# Patient Record
Sex: MALE | Race: BLACK OR AFRICAN AMERICAN | Employment: FULL TIME | URBAN - METROPOLITAN AREA
[De-identification: names, ages, dates, MRNs, and addresses within clinical notes are randomized per-mention and may not be internally consistent; named-entity substitution may affect disease eponyms.]

---

## 2017-10-16 LAB
GLUCOSE BLD STRIP.AUTO-MCNC: 136 MG/DL (ref 65–100)
GLUCOSE BLD STRIP.AUTO-MCNC: 90 MG/DL (ref 65–100)
SERVICE CMNT-IMP: ABNORMAL
SERVICE CMNT-IMP: NORMAL

## 2017-10-16 PROCEDURE — 82962 GLUCOSE BLOOD TEST: CPT

## 2017-10-16 PROCEDURE — 99284 EMERGENCY DEPT VISIT MOD MDM: CPT

## 2017-10-17 ENCOUNTER — HOSPITAL ENCOUNTER (EMERGENCY)
Age: 37
Discharge: HOME | End: 2017-10-17
Attending: EMERGENCY MEDICINE
Payer: COMMERCIAL

## 2017-10-17 VITALS
DIASTOLIC BLOOD PRESSURE: 54 MMHG | HEART RATE: 67 BPM | BODY MASS INDEX: 25.34 KG/M2 | HEIGHT: 70 IN | OXYGEN SATURATION: 98 % | TEMPERATURE: 97.3 F | RESPIRATION RATE: 16 BRPM | WEIGHT: 177 LBS | SYSTOLIC BLOOD PRESSURE: 103 MMHG

## 2017-10-17 DIAGNOSIS — T50.901A MEDICATION ADMINISTERED IN ERROR, ACCIDENTAL OR UNINTENTIONAL, INITIAL ENCOUNTER: Primary | ICD-10-CM

## 2017-10-17 LAB
GLUCOSE BLD STRIP.AUTO-MCNC: 138 MG/DL (ref 65–100)
GLUCOSE BLD STRIP.AUTO-MCNC: 138 MG/DL (ref 65–100)
GLUCOSE BLD STRIP.AUTO-MCNC: 185 MG/DL (ref 65–100)
SERVICE CMNT-IMP: ABNORMAL

## 2017-10-17 PROCEDURE — 82962 GLUCOSE BLOOD TEST: CPT

## 2017-10-17 NOTE — DISCHARGE INSTRUCTIONS
Accidental Overdose of Medicine: Care Instructions  Your Care Instructions  Almost any medicine can cause harm if you take too much of it. You have had treatment to help your body get rid of an overdose of a medicine. This may have been an over-the-counter medicine. Or it might be one that a doctor prescribed. It may even have been a vitamin or a supplement. During treatment, the doctor may have given you fluids and medicine. You also may have had lab tests. Then the doctor made sure that you were well enough to go home. The doctor has checked you carefully, but problems can develop later. If you notice any problems or new symptoms, get medical treatment right away. Follow-up care is a key part of your treatment and safety. Be sure to make and go to all appointments, and call your doctor if you are having problems. It's also a good idea to know your test results and keep a list of the medicines you take. How can you care for yourself at home? Home care  · Drink plenty of fluids. If you have kidney, heart, or liver disease and have to limit fluids, talk with your doctor before you increase the amount of fluids you drink. · If you normally take medicines, ask your doctor when you can start taking them again. · Read the information that comes with any medicine. If you have questions, ask your doctor or pharmacist.  Prevention  · Be safe with medicines. Take your medicines exactly as prescribed or as the label directs. Call your doctor if you think you are having a problem with your medicine. · Keep your medicines in the containers they came in. Keep them with the original labels. · Find out what to do if you miss a dose of your medicine. When should you call for help? Call 911 anytime you think you may need emergency care. For example, call if:  · You passed out (lost consciousness). · You have trouble breathing. · You are sleepy or hard to wake up.   Call your doctor now or seek immediate medical care if:  · You are vomiting. · You have a new or worse headache. · You are dizzy or lightheaded or feel like you may faint. Watch closely for changes in your health, and be sure to contact your doctor if:  · You do not get better as expected. Where can you learn more? Go to http://philomena-shania.info/. Enter C165 in the search box to learn more about \"Accidental Overdose of Medicine: Care Instructions. \"  Current as of: March 24, 2017  Content Version: 11.3  © 7466-9191 [employer]. Care instructions adapted under license by [employer] (which disclaims liability or warranty for this information). If you have questions about a medical condition or this instruction, always ask your healthcare professional. [address] any warranty or liability for your use of this information.

## 2017-10-17 NOTE — ED PROVIDER NOTES
[facility]  EMERGENCY DEPARTMENT HISTORY AND PHYSICAL EXAM       Date of Service: 10/17/2017   Patient Name: Jonatan Forman   YOB: 1980  Medical Record Number: [id number]    History of Presenting Illness     Chief Complaint   Patient presents with    Medication Problem     pt reports he\"accidently took 30 units of humalog insulin at 10 pm instead of 3 units of humalog. \"         History Provided By:  patient    Additional History:     Jonatan Forman is a 39 y.o. male, pmhx DM / quadriplegia, who presents in wheelchair to the ED with concern for potential hypoglycemia PTA this evening. Pt is a quadriplegic and notes having home health care come help him get ready for bed every night. He states this evening his home health aid accidentally gave him 30 units of his Humalog instead of his usual 3 units. Pt expresses concern for hypoglycemia, thus prompting his visit to the ED tonight. On evaluation in the ED, pt states he has already eaten a turkey sandwich, grapes, a fruit cup, and 2 orange juices. He is otherwise without complaint at this time. Pt specifically denies any recent fevers, chills, nausea, vomiting, diarrhea, abd pain, CP, SOB, urinary sxs, changes in BM, or headache. Given pt is currently asymptomatic and without complaint there is no applicable location, quality, duration, severity, timing, context, associated sxs, additional context, or modifying factors. Allergies: NKDA  PMHx: Significant for DM, quadriplegic C6-7  PSHx: pt denies  Social Hx: -tobacco (former), +EtOH, -Illicit Drugs    There are no other complaints, changes, or physical findings at this time.      Primary Care Provider: Ivana Braxton MD       Past History       Past Medical History:   Past Medical History:   Diagnosis Date    Neurological disorder 2015    quad C 6-7    Type I (juvenile type) diabetes mellitus without mention of complication, uncontrolled Age 20    Unspecified vitamin D deficiency 5/7/2012        Past Surgical History:   No past surgical history on file. Family History:   Family History   Problem Relation Age of Onset    Diabetes Maternal Grandmother     Hypertension Mother     Heart Disease Neg Hx     Stroke Neg Hx         Social History:   Social History   Substance Use Topics    Smoking status: Former Smoker    Smokeless tobacco: Never Used      Comment: may have a cigar with a glass of wine but no cigarettes    Alcohol use Yes      Comment: 1-2x week may have a lite beer or a glass of wine        Allergies:   No Known Allergies       Review of Systems     Review of Systems   Constitutional: Negative for chills and fever. HENT: Negative for congestion, ear pain, rhinorrhea and sore throat. Respiratory: Negative for cough and shortness of breath. Cardiovascular: Negative for chest pain, palpitations and leg swelling. Gastrointestinal: Negative for abdominal pain, constipation, diarrhea, nausea and vomiting. No melena  No hematochezia   Endocrine: Negative for polyuria. Genitourinary: Negative for dysuria, frequency and hematuria. Neurological: Negative for dizziness, weakness, light-headedness, numbness and headaches. No tingling   All other systems reviewed and are negative.       Physical Exam    General appearance - well nourished, well appearing, and in no distress, in a wheelchair  Eyes - pupils equal and reactive, extraocular eye movements intact  ENT - mucous membranes moist, pharynx normal without lesions  Neck - supple, no significant adenopathy; non-tender to palpation  Chest - clear to auscultation, no wheezes, rales or rhonchi; non-tender to palpation  Heart - normal rate and regular rhythm, S1 and S2 normal, no murmurs noted  Abdomen - soft, nontender, nondistended, no masses or organomegaly  Musculoskeletal - no joint tenderness, deformity or swelling; normal ROM  Extremities - peripheral pulses normal, no pedal edema  Skin - normal coloration and turgor, no rashes  Neurological - alert, oriented x3, normal speech, weakness of upper and lower extremities that is chronic  Written by EVELIA Santiagoibe, as dictated by Kaden Jeffries MD         Provider Notes / MDM:     DDx: medication overdose, hypoglycemia      Medical Decision Making    I am the first provider for this patient. I reviewed the vital signs, available nursing notes, past medical history, past surgical history, family history and social history. ED Course:   12:41 AM   Initial assessment performed. The patients presenting problems have been discussed, and they are in agreement with the care plan formulated and outlined with them. I have encouraged them to ask questions as they arise throughout their visit. Progress Notes:    PROGRESS NOTE:  1:57 AM  Pt reevaluated. Pt's , pt tolerating PO without difficulty. Will continue to monitor. Written by EVELIA Santiagoibe, as dictated by Marisela Calderon MD    Progress note:  2:20 AM  Pt noted to be feeling better, BGL improved and stable, ready for discharge. Updated pt and/or family on all final lab findings. Will follow up as instructed. All questions have been answered, pt voiced understanding and agreement with plan. Specific return precautions provided as well as instructions to return to the ED should sx worsen at any time. Vital signs stable for discharge.    Written by EVELIA Santiagoibkyra, as dictated by Kaden Jeffries MD           Diagnostic Study Results    Labs        Recent Results (from the past 12 hour(s))   GLUCOSE, POC    Collection Time: 10/16/17 10:27 PM   Result Value Ref Range    Glucose (POC) 136 (H) 65 - 100 mg/dL    Performed by Stacy Espitia, POC    Collection Time: 10/16/17 11:38 PM   Result Value Ref Range    Glucose (POC) 90 65 - 100 mg/dL    Performed by Urban Vega, POC    Collection Time: 10/17/17 12:08 AM   Result Value Ref Range    Glucose (POC) 138 (H) 65 - 100 mg/dL    Performed by Radha Haskins (ED [facility])    GLUCOSE, POC    Collection Time: 10/17/17 12:59 AM   Result Value Ref Range    Glucose (POC) 138 (H) 65 - 100 mg/dL    Performed by [address], POC    Collection Time: 10/17/17  1:57 AM   Result Value Ref Range    Glucose (POC) 185 (H) 65 - 100 mg/dL    Performed by Cally Kwong*          Radiology - The following have been ordered and reviewed:    No orders to display     CT Results  (Last 48 hours)    None        CXR Results  (Last 48 hours)    None          Vital Signs - Reviewed the patient's vital signs. Patient Vitals for the past 12 hrs:   Temp Pulse Resp BP SpO2   10/17/17 0158 - 67 16 103/54 98 %   10/17/17 0153 - - - 103/54 -   10/16/17 2228 97.3 °F (36.3 °C) (!) 55 14 108/57 100 %       Medications Given in the ED:    Medications - No data to display      Diagnosis:  Clinical Impression:     1. Medication administered in error, accidental or unintentional, initial encounter         Plan:  1. Follow-up Information     Follow up With Details Devante Ireland MD In 2 days  [place] [address]  [phone]      [facility] EMERGENCY DEPT  If symptoms worsen [address] [facility]  [address] [facility]  [phone]          2. Discharge Medication List as of 10/17/2017  2:27 AM        Return to ED if worse. Disposition:    DISCHARGE NOTE:  2:27 AM  The patient's results have been reviewed with family and/or caregiver. They verbally convey their understanding and agreement of the patient's signs, symptoms, diagnosis, treatment, and prognosis. They additionally agree to follow up as recommended in the discharge instructions or to return to the Emergency Room should the patient's condition change prior to their follow-up appointment. The family and/or caregiver verbally agrees with the care-plan and all of their questions have been answered.  The discharge instructions have also been provided to the them along with educational information regarding the patient's diagnosis and a list of reasons why the patient would want to return to the ER prior to their follow-up appointment should their condition change. Written by EVELIA Isaacs, as dictated by Nadja Hallman MD.             This note is prepared by Merlyn Zamudio, acting as Scribe for MD Marisela Schrader MD : The scribe's documentation has been prepared under my direction and personally reviewed by me in its entirety. I confirm that the note above accurately reflects all work, treatment, procedures, and medical decision making performed by me. This note will not be viewable in [address]

## 2017-10-17 NOTE — ED NOTES
Dr ___Obier_____________________ in to talk with patient and explain plan of care with  understanding and  written & verbal instructions.

## 2017-10-17 NOTE — ED NOTES
Pt provided turkey sandwich and orange juice outside of triage 1 while waiting for ED room per ER MD Yarelis Moeller request

## 2017-10-28 ENCOUNTER — HOSPITAL ENCOUNTER (EMERGENCY)
Age: 37
Discharge: HOME | End: 2017-10-29
Attending: EMERGENCY MEDICINE
Payer: COMMERCIAL

## 2017-10-28 DIAGNOSIS — N39.0 URINARY TRACT INFECTION ASSOCIATED WITH INDWELLING URETHRAL CATHETER, INITIAL ENCOUNTER (HCC): Primary | ICD-10-CM

## 2017-10-28 DIAGNOSIS — T83.511A URINARY TRACT INFECTION ASSOCIATED WITH INDWELLING URETHRAL CATHETER, INITIAL ENCOUNTER (HCC): Primary | ICD-10-CM

## 2017-10-28 DIAGNOSIS — R31.9 HEMATURIA, UNSPECIFIED TYPE: ICD-10-CM

## 2017-10-28 LAB
GLUCOSE BLD STRIP.AUTO-MCNC: 283 MG/DL (ref 65–100)
SERVICE CMNT-IMP: ABNORMAL

## 2017-10-28 PROCEDURE — 82962 GLUCOSE BLOOD TEST: CPT

## 2017-10-28 PROCEDURE — 74011250637 HC RX REV CODE- 250/637: Performed by: EMERGENCY MEDICINE

## 2017-10-28 PROCEDURE — 99283 EMERGENCY DEPT VISIT LOW MDM: CPT

## 2017-10-28 RX ORDER — BUTALBITAL, ACETAMINOPHEN AND CAFFEINE 50; 325; 40 MG/1; MG/1; MG/1
1 TABLET ORAL
Status: COMPLETED | OUTPATIENT
Start: 2017-10-28 | End: 2017-10-28

## 2017-10-28 RX ADMIN — BUTALBITAL, ACETAMINOPHEN AND CAFFEINE 1 TABLET: 50; 325; 40 TABLET ORAL at 23:51

## 2017-10-29 VITALS
HEIGHT: 71 IN | OXYGEN SATURATION: 100 % | RESPIRATION RATE: 18 BRPM | DIASTOLIC BLOOD PRESSURE: 102 MMHG | TEMPERATURE: 97.8 F | BODY MASS INDEX: 24.36 KG/M2 | WEIGHT: 174 LBS | SYSTOLIC BLOOD PRESSURE: 152 MMHG | HEART RATE: 67 BPM

## 2017-10-29 LAB
ALBUMIN SERPL-MCNC: 3.6 G/DL (ref 3.5–5)
ALBUMIN/GLOB SERPL: 1 {RATIO} (ref 1.1–2.2)
ALP SERPL-CCNC: 66 U/L (ref 45–117)
ALT SERPL-CCNC: 15 U/L (ref 12–78)
ANION GAP SERPL CALC-SCNC: 7 MMOL/L (ref 5–15)
APPEARANCE UR: ABNORMAL
APTT PPP: 28.9 SEC (ref 22.1–32.5)
AST SERPL-CCNC: 9 U/L (ref 15–37)
BACTERIA URNS QL MICRO: ABNORMAL /HPF
BASOPHILS # BLD: 0 K/UL (ref 0–0.1)
BASOPHILS NFR BLD: 1 % (ref 0–1)
BILIRUB SERPL-MCNC: 0.4 MG/DL (ref 0.2–1)
BILIRUB UR QL CFM: NEGATIVE
BUN SERPL-MCNC: 13 MG/DL (ref 6–20)
BUN/CREAT SERPL: 19 (ref 12–20)
CALCIUM SERPL-MCNC: 8.9 MG/DL (ref 8.5–10.1)
CHLORIDE SERPL-SCNC: 102 MMOL/L (ref 97–108)
CO2 SERPL-SCNC: 28 MMOL/L (ref 21–32)
COLOR UR: ABNORMAL
CREAT SERPL-MCNC: 0.7 MG/DL (ref 0.7–1.3)
EOSINOPHIL # BLD: 0.2 K/UL (ref 0–0.4)
EOSINOPHIL NFR BLD: 3 % (ref 0–7)
EPITH CASTS URNS QL MICRO: ABNORMAL /LPF
ERYTHROCYTE [DISTWIDTH] IN BLOOD BY AUTOMATED COUNT: 15.3 % (ref 11.5–14.5)
GLOBULIN SER CALC-MCNC: 3.5 G/DL (ref 2–4)
GLUCOSE SERPL-MCNC: 296 MG/DL (ref 65–100)
GLUCOSE UR STRIP.AUTO-MCNC: >1000 MG/DL
HCT VFR BLD AUTO: 38.7 % (ref 36.6–50.3)
HGB BLD-MCNC: 13.6 G/DL (ref 12.1–17)
HGB UR QL STRIP: ABNORMAL
HYALINE CASTS URNS QL MICRO: ABNORMAL /LPF (ref 0–5)
INR PPP: 1.1 (ref 0.9–1.1)
KETONES UR QL STRIP.AUTO: 15 MG/DL
LEUKOCYTE ESTERASE UR QL STRIP.AUTO: ABNORMAL
LYMPHOCYTES # BLD: 1.8 K/UL (ref 0.8–3.5)
LYMPHOCYTES NFR BLD: 32 % (ref 12–49)
MCH RBC QN AUTO: 30 PG (ref 26–34)
MCHC RBC AUTO-ENTMCNC: 35.1 G/DL (ref 30–36.5)
MCV RBC AUTO: 85.2 FL (ref 80–99)
MONOCYTES # BLD: 0.6 K/UL (ref 0–1)
MONOCYTES NFR BLD: 11 % (ref 5–13)
NEUTS SEG # BLD: 3 K/UL (ref 1.8–8)
NEUTS SEG NFR BLD: 53 % (ref 32–75)
NITRITE UR QL STRIP.AUTO: NEGATIVE
PH UR STRIP: 6 [PH] (ref 5–8)
PLATELET # BLD AUTO: 218 K/UL (ref 150–400)
POTASSIUM SERPL-SCNC: 3.8 MMOL/L (ref 3.5–5.1)
PROT SERPL-MCNC: 7.1 G/DL (ref 6.4–8.2)
PROT UR STRIP-MCNC: 300 MG/DL
PROTHROMBIN TIME: 10.6 SEC (ref 9–11.1)
RBC # BLD AUTO: 4.54 M/UL (ref 4.1–5.7)
RBC #/AREA URNS HPF: ABNORMAL /HPF (ref 0–5)
SODIUM SERPL-SCNC: 137 MMOL/L (ref 136–145)
SP GR UR REFRACTOMETRY: 1.02 (ref 1–1.03)
THERAPEUTIC RANGE,PTTT: NORMAL SECS (ref 58–77)
UROBILINOGEN UR QL STRIP.AUTO: 1 EU/DL (ref 0.2–1)
WBC # BLD AUTO: 5.6 K/UL (ref 4.1–11.1)
WBC URNS QL MICRO: ABNORMAL /HPF (ref 0–4)

## 2017-10-29 PROCEDURE — 85610 PROTHROMBIN TIME: CPT | Performed by: EMERGENCY MEDICINE

## 2017-10-29 PROCEDURE — 96365 THER/PROPH/DIAG IV INF INIT: CPT

## 2017-10-29 PROCEDURE — 36415 COLL VENOUS BLD VENIPUNCTURE: CPT | Performed by: EMERGENCY MEDICINE

## 2017-10-29 PROCEDURE — 77030005514 HC CATH URETH FOL14 BARD -A

## 2017-10-29 PROCEDURE — 85025 COMPLETE CBC W/AUTO DIFF WBC: CPT | Performed by: EMERGENCY MEDICINE

## 2017-10-29 PROCEDURE — 81001 URINALYSIS AUTO W/SCOPE: CPT | Performed by: EMERGENCY MEDICINE

## 2017-10-29 PROCEDURE — 51702 INSERT TEMP BLADDER CATH: CPT

## 2017-10-29 PROCEDURE — 80053 COMPREHEN METABOLIC PANEL: CPT | Performed by: EMERGENCY MEDICINE

## 2017-10-29 PROCEDURE — 74011000258 HC RX REV CODE- 258: Performed by: EMERGENCY MEDICINE

## 2017-10-29 PROCEDURE — 85730 THROMBOPLASTIN TIME PARTIAL: CPT | Performed by: EMERGENCY MEDICINE

## 2017-10-29 PROCEDURE — 74011250636 HC RX REV CODE- 250/636: Performed by: EMERGENCY MEDICINE

## 2017-10-29 RX ORDER — SODIUM CHLORIDE 900 MG/100ML
INJECTION INTRAVENOUS
Status: DISCONTINUED
Start: 2017-10-29 | End: 2017-10-29 | Stop reason: HOSPADM

## 2017-10-29 RX ORDER — CEPHALEXIN 500 MG/1
500 CAPSULE ORAL 3 TIMES DAILY
Qty: 21 CAP | Refills: 0 | Status: SHIPPED | OUTPATIENT
Start: 2017-10-29 | End: 2017-11-05

## 2017-10-29 RX ORDER — TAMSULOSIN HYDROCHLORIDE 0.4 MG/1
0.4 CAPSULE ORAL DAILY
Qty: 15 CAP | Refills: 0 | Status: SHIPPED | OUTPATIENT
Start: 2017-10-29 | End: 2017-11-13

## 2017-10-29 RX ORDER — CEFTRIAXONE 1 G/1
INJECTION, POWDER, FOR SOLUTION INTRAMUSCULAR; INTRAVENOUS
Status: DISCONTINUED
Start: 2017-10-29 | End: 2017-10-29 | Stop reason: HOSPADM

## 2017-10-29 RX ADMIN — CEFTRIAXONE 1 G: 1 INJECTION, POWDER, FOR SOLUTION INTRAMUSCULAR; INTRAVENOUS at 01:15

## 2017-10-29 NOTE — ED NOTES
Bladder scanned the pt. Bladder scanner showed 0 mL of urine in the bladder.  Discussed with Jeanette Mason MD.

## 2017-10-29 NOTE — ED NOTES
Pt requested peroxide to rinse his mouth. Diluted peroxide with water and gave to pt to rinse his mouth.

## 2017-10-29 NOTE — ED NOTES
Procedure explained to patient and he consents to catheter irrigation. Indwelling catheter irrigated without resistance with 70cc of sterile solution, retrieved approx 70cc of fluids back with a few small blood clots. Irrigated again with 60cc of sterile fluids with retrieval of 60cc return with a couple of small blood clots. No evidence of catheter obstruction. Patient tolerated without complaint.   Assisted with getting dressed and patient was able to transfer himself to wheelchair with stand by assist.

## 2017-10-29 NOTE — DISCHARGE INSTRUCTIONS
Blood in the Urine: Care Instructions  Your Care Instructions    Blood in the urine, or hematuria, may make the urine look red, brown, or pink. There may be blood every time you urinate or just from time to time. You cannot always see blood in the urine, but it will show up in a urine test.  Blood in the urine may be serious. It should always be checked by a doctor. Your doctor may recommend more tests, including an X-ray, a CT scan, or a cystoscopy (which lets a doctor look inside the urethra and bladder). Blood in the urine can be a sign of another problem. Common causes are bladder infections and kidney stones. An injury to your groin or your genital area can also cause bleeding in the urinary tract. Very hard exercise-such as running a marathon-can cause blood in the urine. Blood in the urine can also be a sign of kidney disease or cancer in the bladder or kidney. Many cases of blood in the urine are caused by a harmless condition that runs in families. This is called benign familial hematuria. It does not need any treatment. Sometimes your urine may look red or brown even though it does not contain blood. For example, not getting enough fluids (dehydration), taking certain medicines, or having a liver problem can change the color of your urine. Eating foods such as beets, rhubarb, or blackberries or foods with red food coloring can make your urine look red or pink. Follow-up care is a key part of your treatment and safety. Be sure to make and go to all appointments, and call your doctor if you are having problems. It's also a good idea to know your test results and keep a list of the medicines you take. When should you call for help? Call your doctor now or seek immediate medical care if:  · You have symptoms of a urinary infection. For example:  ¨ You have pus in your urine. ¨ You have pain in your back just below your rib cage. This is called flank pain.   ¨ You have a fever, chills, or body aches.  ¨ It hurts to urinate. ¨ You have groin or belly pain. · You have more blood in your urine. Watch closely for changes in your health, and be sure to contact your doctor if:  · You have new urination problems. · You do not get better as expected. Where can you learn more? Go to http://philomena-shania.info/. Enter G089 in the search box to learn more about \"Blood in the Urine: Care Instructions. \"  Current as of: May 12, 2017  Content Version: 11.4  © 8423-2604 [employer]. Care instructions adapted under license by [employer] (which disclaims liability or warranty for this information). If you have questions about a medical condition or this instruction, always ask your healthcare professional. [address] any warranty or liability for your use of this information. Learning About Urinary Catheter Care to Prevent Infection  What is a urinary catheter? A urinary catheter is a flexible plastic tube used to drain urine from your bladder when you can't urinate on your own. The catheter allows urine to drain from the bladder into a bag. Two types of drainage bags may be used with a urinary catheter. · A bedside bag is a large bag that you can hang on the side of your bed or on a chair. You can use it overnight or anytime you will be sitting or lying down for a long time. · A leg bag is a small bag that you can use during the day. It is usually attached to your thigh or calf and hidden under your clothes. Having a urinary catheter increases your risk of getting a urinary tract infection. Germs may get on the catheter and cause an infection in your bladder or kidneys. The longer you have a catheter, the more likely it is that you will get an infection. You can help prevent this problem with good hygiene and careful handling of your catheter and drainage bags. How can you help prevent infection?   Take care to be clean  · Always wash your hands well before and after you handle your catheter. · Clean the skin around the catheter twice a day using soap and water. Dry with a clean towel afterward. You can shower with your catheter and drainage bag in place unless your doctor told you not to. · When you clean around the catheter, check the surrounding skin for signs of infection. Look for things like pus or irritated, swollen, red, or tender skin around the catheter. Be careful with your drainage bag  · Always keep the drainage bag below the level of your bladder. This will help keep urine from flowing back into your bladder. · Check often to see that urine is flowing through the catheter into the drainage bag. · Empty the drainage bag when it is half full. This will keep it from overflowing or backing up. · When you empty the drainage bag, do not let the tubing or drain spout touch anything. Be careful with your catheter  · Do not unhook the catheter from the drain tube. That could let germs get into the tube. · Make sure that the catheter tubing does not get twisted or kinked. · Do not tug or pull on the catheter. And make sure that the drainage bag does not drag or pull on the catheter. · Do not put powder or lotion on the skin around the catheter. · Talk with your doctor about your options for sexual intercourse while wearing a catheter. How do you empty a urine drainage bag? If your doctor has asked you to keep a record, write down the amount of urine in the bag before you empty it. Wash your hands before and after you touch the bag. 1. Remove the drain spout from its sleeve at the bottom of the drainage bag.  2. Open the valve on the drain spout. Let the urine flow out into the toilet or a container. Be careful not to let the tubing or drain spout touch anything. 3. After you empty the bag, wipe off any liquid on the end of the drain spout. Close the valve.  Then put the drain spout back into its sleeve at the bottom of the collection bag. How do you add a bedside bag to a leg bag? Wash your hands before and after you handle the bags. 1. Empty the leg bag attached to the catheter. 2. Put a clean towel under the leg bag.  3. Use an alcohol wipe to clean the tip of the bedside bag. Then connect the bedside bag to the leg bag. How can you clean a bedside drainage bag? Many people clean their bedside bag in the morning if they switch to a leg bag. To clean a bedside drainage ba. Remove the bedside bag from the leg bag.  2. Fill the bag with 2 parts vinegar and 3 parts water. Let it stand for 20 minutes. 3. Empty the bag, and let it air dry. When should you call for help? Call your doctor now or seek immediate medical care if:  · You have symptoms of a urinary infection. These may include:  ¨ Pain or burning when you urinate. ¨ A frequent need to urinate without being able to pass much urine. ¨ Pain in the flank, which is just below the rib cage and above the waist on either side of the back. ¨ Blood in your urine. ¨ A fever. · Your urine smells bad. · You see large blood clots in your urine. · No urine or very little urine is flowing into the bag for 4 or more hours. Watch closely for changes in your health, and be sure to contact your doctor if:  · The area around the catheter becomes irritated, swollen, red, or tender, or there is pus draining from it. · Urine is leaking from the place where the catheter enters your body. Follow-up care is a key part of your treatment and safety. Be sure to make and go to all appointments, and call your doctor if you are having problems. It's also a good idea to know your test results and keep a list of the medicines you take. Where can you learn more? Go to http://philomena-shania.info/. Enter C910 in the search box to learn more about \"Learning About Urinary Catheter Care to Prevent Infection. \"  Current as of:  May 12, 2017  Content Version: 11.4  © 1692-2003 [employer]. Care instructions adapted under license by [employer] (which disclaims liability or warranty for this information). If you have questions about a medical condition or this instruction, always ask your healthcare professional. [address] any warranty or liability for your use of this information.
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
## 2017-10-29 NOTE — ED NOTES
Patient presents to ED with C/O blood in urine and a H/A that started yesterday. The pt stated he had a bonner placed on Friday. The pt stated he noticed blood in the bonner bag Friday night after the bag was hanging off the side of the bed. The pt stated he thought he put the bag on the bed, but when he woke up the bag was hanging off the side of the bed. The pt denies fever/chills. Patient is A&Ox3, side rails up, call bell w/in reach, and aware of plan of care. The patient is in NAD.

## 2017-10-29 NOTE — ED NOTES
Patient discharged and given discharge instructions by Brad Marcano MD. Patient had an opportunity to ask questions. Patient verbalized understanding of discharge instructions. Patient d/c from ED In his personal wheelchair, discharge instructions and prescriptions in hand. Patient accompanied by self. Discussed with Brad Marcano MD pt's BP of 152/102 and toothache. Pt refused medication. Brad Marcano MD okay for pt to be d/c'd. Pt requesting bonner to be changed.  Discussed with Brad Marcano MD.

## 2018-03-16 ENCOUNTER — OFFICE VISIT (OUTPATIENT)
Dept: URGENT CARE | Age: 38
End: 2018-03-16

## 2018-03-16 VITALS
WEIGHT: 179 LBS | DIASTOLIC BLOOD PRESSURE: 107 MMHG | TEMPERATURE: 98.4 F | RESPIRATION RATE: 18 BRPM | BODY MASS INDEX: 25.06 KG/M2 | OXYGEN SATURATION: 99 % | HEIGHT: 71 IN | SYSTOLIC BLOOD PRESSURE: 194 MMHG | HEART RATE: 63 BPM

## 2018-03-16 DIAGNOSIS — K08.89 PAIN OF TOOTH SOCKET: Primary | ICD-10-CM

## 2018-03-16 RX ORDER — CLINDAMYCIN HYDROCHLORIDE 300 MG/1
300 CAPSULE ORAL 3 TIMES DAILY
Qty: 30 CAP | Refills: 0 | Status: SHIPPED | OUTPATIENT
Start: 2018-03-16 | End: 2018-03-26

## 2018-03-16 RX ORDER — ACETAMINOPHEN AND CODEINE PHOSPHATE 300; 30 MG/1; MG/1
1 TABLET ORAL
Qty: 12 TAB | Refills: 0 | Status: SHIPPED | OUTPATIENT
Start: 2018-03-16 | End: 2019-03-14

## 2018-03-16 NOTE — PATIENT INSTRUCTIONS
Dental Pain: After Your Visit  Your Care Instructions  The most common cause of dental pain is tooth decay. It can also be caused by an infection of the tooth (abscess) or gum, a tooth that has not broken all the way through the gum (impacted tooth), or a problem with the nerve-filled center of the tooth. Follow-up care is a key part of your treatment and safety. Be sure to make and go to all appointments, and call your doctor if you are having problems. Its also a good idea to know your test results and keep a list of the medicines you take. How can you care for yourself at home? · Contact a dentist for follow-up care. · Put ice or a cold pack on the outside of your mouth for 10 to 20 minutes at a time to reduce pain and swelling. Put a thin cloth between the ice and your skin. · Take an over-the-counter pain medicine, such as acetaminophen (Tylenol), ibuprofen (Advil, Motrin), or naproxen (Aleve). Read and follow all instructions on the label. · Do not take two or more pain medicines at the same time unless the doctor told you to. Many pain medicines have acetaminophen, which is Tylenol. Too much acetaminophen (Tylenol) can be harmful. · Rinse your mouth with warm salt water every 2 hours to help relieve pain and swelling from an infected tooth. Mix 1 teaspoon of salt in 8 ounces of water. · If your doctor prescribed antibiotics, take them as directed. Do not stop taking them just because you feel better. You need to take the full course of antibiotics. When should you call for help? Call your doctor now or seek immediate medical care if:  · You have signs of infection, such as:  ¨ Increased pain, swelling, warmth, or redness. ¨ Pus draining from the gum, tooth, or face. ¨ A fever. Watch closely for changes in your health, and be sure to contact your doctor if:  · You do not get better as expected. Where can you learn more?    Go to [employer]  Enter B564 in the search box to learn more about \"Dental Pain: After Your Visit. \"   © 8857-8058 Healthwise, Incorporated. Care instructions adapted under license by [employer] (which disclaims liability or warranty for this information). This care instruction is for use with your licensed healthcare professional. If you have questions about a medical condition or this instruction, always ask your healthcare professional. [address] any warranty or liability for your use of this information. Content Version: 98.4.983574;  Last Revised: May 17, 2013

## 2018-03-16 NOTE — MR AVS SNAPSHOT
[address] [place] [address] 
[phone] Patient: Mikayla An MRN: [id number] :1980 Visit Information Date & Time Provider Department Dept. Phone Encounter #  
 3/16/2018  7:15 PM [address] [facility] [phone] [id number] Follow-up Instructions Return for Follow up with PCP. Upcoming Health Maintenance Date Due  
 FOOT EXAM Q1 1989 EYE EXAM RETINAL OR DILATED Q1 1989 Pneumococcal 19-64 Medium Risk (1 of 1 - PPSV23) 1998 DTaP/Tdap/Td series (1 - Tdap) 2000 HEMOGLOBIN A1C Q6M 2015 MICROALBUMIN Q1 2015 LIPID PANEL Q1 2015 Influenza Age 5 to Adult 2017 Allergies as of 3/16/2018  Review Complete On: 3/16/2018 By: Cadence Collado RN No Known Allergies Current Immunizations  Reviewed on 2016 No immunizations on file. Not reviewed this visit You Were Diagnosed With   
  
 Codes Comments Pain of tooth socket    -  Primary ICD-10-CM: K08.89 ICD-9-CM: 525.9 h/o broken tooth Vitals BP Pulse Temp Resp Height(growth percentile) Weight(growth percentile) (!) 194/107 63 98.4 °F (36.9 °C) 18 5' 11\" (1.803 m) 179 lb (81.2 kg) SpO2 BMI Smoking Status 99% 24.97 kg/m2 Former Smoker BMI and BSA Data Body Mass Index Body Surface Area 24.97 kg/m 2 2.02 m 2 Preferred Pharmacy Pharmacy Name Phone [facility], [address] [place] [address] AT [address] [place] [address] Drive [phone] Your Updated Medication List  
  
   
This list is accurate as of 3/16/18  7:52 PM.  Always use your most recent med list.  
  
  
  
  
 acetaminophen-codeine 300-30 mg per tablet Commonly known as:  TYLENOL-CODEINE #3 Take 1 Tab by mouth every four (4) hours as needed for Pain. Max Daily Amount: 6 Tabs. Ascensia CONTOUR strip Generic drug:  glucose blood VI test strips Use as directed three times daily. Dx: 250.03  
  
 baclofen 20 mg tablet Commonly known as:  LIORESAL Take 20 mg by mouth three (3) times daily. clindamycin 300 mg capsule Commonly known as:  CLEOCIN Take 1 Cap by mouth three (3) times daily for 10 days. cloNIDine HCl 0.2 mg tablet Commonly known as:  CATAPRES Take 0.2 mg by mouth two (2) times a day. dantrolene 25 mg capsule Commonly known as:  DANTRIUM Take 50 mg by mouth four (4) times daily. insulin glargine 100 unit/mL injection Commonly known as:  LANTUS  
36 Units by SubCUTAneous route nightly. Indications: TYPE 1 DIABETES MELLITUS  
  
 insulin lispro 100 unit/mL injection Commonly known as:  HUMALOG  
10-12 Units by SubCUTAneous route Before breakfast, lunch, and dinner. Indications: TYPE 1 DIABETES MELLITUS Insulin Syringe-Needle U-100 0.3 mL 31 gauge x 5/16 Syrg Use as directed twice daily  
  
 oxybutynin 5 mg tablet Commonly known as:  QOMKOYUC Take 5 mg by mouth three (3) times daily. traZODone 50 mg tablet Commonly known as:  Bonnita Genre Take 50 mg by mouth nightly. VITAMIN D3 2,000 unit Tab Generic drug:  cholecalciferol (vitamin D3) Take 4,000-5,000 mL by mouth daily. Prescriptions Printed Refills  
 acetaminophen-codeine (TYLENOL-CODEINE #3) 300-30 mg per tablet 0 Sig: Take 1 Tab by mouth every four (4) hours as needed for Pain. Max Daily Amount: 6 Tabs. Class: Print Route: Oral  
  
Prescriptions Sent to Pharmacy Refills  
 clindamycin (CLEOCIN) 300 mg capsule 0 Sig: Take 1 Cap by mouth three (3) times daily for 10 days. Class: Normal  
 Pharmacy: [facility] [address] AT [address] Ph #: [phone] Route: Oral  
  
Follow-up Instructions Return for Follow up with PCP. Patient Instructions Dental Pain: After Your Visit Your Care Instructions The most common cause of dental pain is tooth decay. It can also be caused by an infection of the tooth (abscess) or gum, a tooth that has not broken all the way through the gum (impacted tooth), or a problem with the nerve-filled center of the tooth. Follow-up care is a key part of your treatment and safety. Be sure to make and go to all appointments, and call your doctor if you are having problems. Its also a good idea to know your test results and keep a list of the medicines you take. How can you care for yourself at home? · Contact a dentist for follow-up care. · Put ice or a cold pack on the outside of your mouth for 10 to 20 minutes at a time to reduce pain and swelling. Put a thin cloth between the ice and your skin. · Take an over-the-counter pain medicine, such as acetaminophen (Tylenol), ibuprofen (Advil, Motrin), or naproxen (Aleve). Read and follow all instructions on the label. · Do not take two or more pain medicines at the same time unless the doctor told you to. Many pain medicines have acetaminophen, which is Tylenol. Too much acetaminophen (Tylenol) can be harmful. · Rinse your mouth with warm salt water every 2 hours to help relieve pain and swelling from an infected tooth. Mix 1 teaspoon of salt in 8 ounces of water. · If your doctor prescribed antibiotics, take them as directed. Do not stop taking them just because you feel better. You need to take the full course of antibiotics. When should you call for help? Call your doctor now or seek immediate medical care if: 
· You have signs of infection, such as: 
¨ Increased pain, swelling, warmth, or redness. ¨ Pus draining from the gum, tooth, or face. ¨ A fever. Watch closely for changes in your health, and be sure to contact your doctor if: 
· You do not get better as expected. Where can you learn more? Go to [employer].be Enter V264 in the search box to learn more about \"Dental Pain: After Your Visit. \"  
© 2224-6118 Healthwise, Incorporated. Care instructions adapted under license by [employer] (which disclaims liability or warranty for this information). This care instruction is for use with your licensed healthcare professional. If you have questions about a medical condition or this instruction, always ask your healthcare professional. [address] any warranty or liability for your use of this information. Content Version: 52.5.064663; Last Revised: May 17, 2013 Introducing [facility]! [employer] introduces [employer] patient portal. Now you can access parts of your medical record, email your doctor's office, and request medication refills online. 1. In your internet browser, go to [email or web address] "[employer]"/[facility] 2. Click on the First Time User? Click Here link in the Sign In box. You will see the New Member Sign Up page. 3. Enter your [employer] Access Code exactly as it appears below. You will not need to use this code after youve completed the sign-up process. If you do not sign up before the expiration date, you must request a new code. · [employer] Access Code: [email or web address] Expires: 6/14/2018  7:26 PM 
 
4. Enter the last four digits of your Social Security Number (xxxx) and Date of Birth (mm/dd/yyyy) as indicated and click Submit. You will be taken to the next sign-up page. 5. Create a [employer] ID. This will be your [employer] login ID and cannot be changed, so think of one that is secure and easy to remember. 6. Create a [employer] password. You can change your password at any time. 7. Enter your Password Reset Question and Answer. This can be used at a later time if you forget your password. 8. Enter your e-mail address. You will receive e-mail notification when new information is available in [address] 9. Click Sign Up. You can now view and download portions of your medical record. 10. Click the Download Summary menu link to download a portable copy of your medical information. If you have questions, please visit the Frequently Asked Questions section of the [employer] website. Remember, [employer] is NOT to be used for urgent needs. For medical emergencies, dial 911. Now available from your iPhone and Android! Please provide this summary of care documentation to your next provider. Your primary care clinician is listed as Traci Rachel. If you have any questions after today's visit, please call [phone].

## 2018-03-17 NOTE — PROGRESS NOTES
Patient is a 44 y.o. male presenting with dental problem. Dental Pain    The history is provided by the patient. This is a new problem. The current episode started yesterday. The problem occurs constantly. The problem has been rapidly worsening. The pain is located in the left upper mouth. The quality of the pain is constant, throbbing and stabbing. The pain is at a severity of 10/10. The pain is severe. There was no fever, no shortness of breath, no gum redness and no drainage. Treatments tried: motrin  The treatment provided no relief. 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 Family History   Problem Relation Age of Onset    Diabetes Maternal Grandmother     Hypertension Mother     Heart Disease Neg Hx     Stroke Neg Hx         Social History     Social History    Marital status:      Spouse name: N/A    Number of children: N/A    Years of education: N/A     Occupational History    Not on file. Social History Main Topics    Smoking status: Former Smoker    Smokeless tobacco: Never Used      Comment: may have a cigar with a glass of wine but no cigarettes    Alcohol use Yes      Comment: 1-2x week may have a lite beer or a glass of wine    Drug use: No    Sexual activity: Not Currently     Other Topics Concern    Not on file     Social History Narrative    Lives in [place] with wife of 10 years and 4 yo and 3 yo sons. 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 Works for [employer] in the [employer]. ALLERGIES: Review of patient's allergies indicates no known allergies. Review of Systems   HENT: Positive for dental problem. All other systems reviewed and are negative.       Vitals: 03/16/18 1930   BP: (!) 194/107   Pulse: 63   Resp: 18   Temp: 98.4 °F (36.9 °C)   SpO2: 99%   Weight: 179 lb (81.2 kg)   Height: 5' 11\" (1.803 m)       Physical Exam   HENT:   Mouth/Throat: No oral lesions. No trismus in the jaw. Abnormal dentition (broken tooth ). Dental caries present. No dental abscesses. Nursing note and vitals reviewed. MDM    Procedures      ICD-10-CM ICD-9-CM    1. Pain of tooth socket K08.89 525.9 acetaminophen-codeine (TYLENOL-CODEINE #3) 300-30 mg per tablet    h/o broken tooth      Medications Ordered Today   Medications    clindamycin (CLEOCIN) 300 mg capsule     Sig: Take 1 Cap by mouth three (3) times daily for 10 days. Dispense:  30 Cap     Refill:  0    acetaminophen-codeine (TYLENOL-CODEINE #3) 300-30 mg per tablet     Sig: Take 1 Tab by mouth every four (4) hours as needed for Pain. Max Daily Amount: 6 Tabs. Dispense:  12 Tab     Refill:  0     No results found for any visits on 03/16/18. The patients condition was discussed with the patient and they understand. The patient is to follow up with primary care doctor. If signs and symptoms become worse the pt is to go to the ER. The patient is to take medications as prescribed.           reviewed no h/o narcotic use

## 2018-09-09 ENCOUNTER — HOSPITAL ENCOUNTER (EMERGENCY)
Age: 38
Discharge: HOME | End: 2018-09-09
Attending: EMERGENCY MEDICINE
Payer: COMMERCIAL

## 2018-09-09 VITALS
DIASTOLIC BLOOD PRESSURE: 79 MMHG | HEIGHT: 70 IN | BODY MASS INDEX: 24.34 KG/M2 | SYSTOLIC BLOOD PRESSURE: 129 MMHG | RESPIRATION RATE: 16 BRPM | HEART RATE: 60 BPM | WEIGHT: 170 LBS | TEMPERATURE: 98.3 F | OXYGEN SATURATION: 100 %

## 2018-09-09 DIAGNOSIS — T83.510A URINARY TRACT INFECTION ASSOCIATED WITH CYSTOSTOMY CATHETER, INITIAL ENCOUNTER (HCC): Primary | ICD-10-CM

## 2018-09-09 DIAGNOSIS — N39.0 URINARY TRACT INFECTION ASSOCIATED WITH CYSTOSTOMY CATHETER, INITIAL ENCOUNTER (HCC): Primary | ICD-10-CM

## 2018-09-09 LAB
APPEARANCE UR: ABNORMAL
BACTERIA URNS QL MICRO: ABNORMAL /HPF
BILIRUB UR QL: NEGATIVE
COLOR UR: ABNORMAL
EPITH CASTS URNS QL MICRO: ABNORMAL /LPF
GLUCOSE UR STRIP.AUTO-MCNC: 250 MG/DL
HGB UR QL STRIP: ABNORMAL
KETONES UR QL STRIP.AUTO: NEGATIVE MG/DL
LEUKOCYTE ESTERASE UR QL STRIP.AUTO: ABNORMAL
NITRITE UR QL STRIP.AUTO: POSITIVE
PH UR STRIP: 8.5 [PH] (ref 5–8)
PROT UR STRIP-MCNC: 100 MG/DL
RBC #/AREA URNS HPF: ABNORMAL /HPF (ref 0–5)
SP GR UR REFRACTOMETRY: 1.02 (ref 1–1.03)
UA: UC IF INDICATED,UAUC: ABNORMAL
UROBILINOGEN UR QL STRIP.AUTO: 0.2 EU/DL (ref 0.2–1)
WBC URNS QL MICRO: ABNORMAL /HPF (ref 0–4)

## 2018-09-09 PROCEDURE — 99283 EMERGENCY DEPT VISIT LOW MDM: CPT

## 2018-09-09 PROCEDURE — 87077 CULTURE AEROBIC IDENTIFY: CPT | Performed by: EMERGENCY MEDICINE

## 2018-09-09 PROCEDURE — 81001 URINALYSIS AUTO W/SCOPE: CPT | Performed by: EMERGENCY MEDICINE

## 2018-09-09 PROCEDURE — 74011250636 HC RX REV CODE- 250/636: Performed by: PHYSICIAN ASSISTANT

## 2018-09-09 PROCEDURE — 87086 URINE CULTURE/COLONY COUNT: CPT | Performed by: EMERGENCY MEDICINE

## 2018-09-09 PROCEDURE — 77030018846 HC SOL IRR STRL H20 ICUM -A

## 2018-09-09 PROCEDURE — 77030005538 HC CATH URETH FOL44 BARD -B

## 2018-09-09 PROCEDURE — 51702 INSERT TEMP BLADDER CATH: CPT

## 2018-09-09 PROCEDURE — 51798 US URINE CAPACITY MEASURE: CPT

## 2018-09-09 PROCEDURE — 87186 SC STD MICRODIL/AGAR DIL: CPT | Performed by: EMERGENCY MEDICINE

## 2018-09-09 PROCEDURE — 96372 THER/PROPH/DIAG INJ SC/IM: CPT

## 2018-09-09 RX ORDER — CEPHALEXIN 500 MG/1
500 CAPSULE ORAL 4 TIMES DAILY
Qty: 28 CAP | Refills: 0 | Status: SHIPPED | OUTPATIENT
Start: 2018-09-09 | End: 2018-09-16

## 2018-09-09 RX ADMIN — LIDOCAINE HYDROCHLORIDE 1 G: 10 INJECTION, SOLUTION EPIDURAL; INFILTRATION; INTRACAUDAL; PERINEURAL at 16:46

## 2018-09-09 NOTE — ED NOTES
CYNTHIA Heredia reviewed discharge instructions with the patient. The patient verbalized understanding. All questions and concerns were addressed. The patient used his wheelchair and is discharged in the care of family members with instructions and prescriptions in hand. Pt is alert and oriented x 4. Respirations are clear and unlabored.

## 2018-09-09 NOTE — ED PROVIDER NOTES
EMERGENCY DEPARTMENT HISTORY AND PHYSICAL EXAM      Date: 9/9/2018  Patient Name: Macy Davalos    History of Presenting Illness     Chief Complaint   Patient presents with    Urinary Catheter Problem     reports no drainage from suprapubic catheter x2 days       History Provided By: Patient    HPI: Macy Davalos, 44 y.o. male with PMHx significant for quadriplegia, presents to the ED with concerns of his suprapubic catheter. Pt notes that he began having issues 1-2 weeks ago (around Labor Day). Pt notes decreased drainage through his catheter. He contacted home health and had the catheter changed 5 days ago. Notes that the new catheter was drainage until yesterday. Pt is concerned that he may have too much \"sediment\" in his urine or that he may have a urinary tract infection. Pt notes that when is bladder it too full he will becoming incontinent of urine. Patient notes that he used to have a bonner catheter but it was converted to a suprapubic catheter due his urethra being stretched due to having chronic indwelling catheter. Pt denies fever/chills but notes that his BP has been elevated recently. There are no other complaints, changes, or physical findings at this time. Social Hx: Tobacco (former smoker), EtOH (social), Illicit drug use (denies)     PCP: Gloria Avalos MD    Current Outpatient Prescriptions   Medication Sig Dispense Refill    cephALEXin (KEFLEX) 500 mg capsule Take 1 Cap by mouth four (4) times daily for 7 days. 28 Cap 0    acetaminophen-codeine (TYLENOL-CODEINE #3) 300-30 mg per tablet Take 1 Tab by mouth every four (4) hours as needed for Pain. Max Daily Amount: 6 Tabs. 12 Tab 0    insulin glargine (LANTUS) 100 unit/mL injection 36 Units by SubCUTAneous route nightly. Indications: TYPE 1 DIABETES MELLITUS      baclofen (LIORESAL) 20 mg tablet Take 20 mg by mouth three (3) times daily.  dantrolene (DANTRIUM) 25 mg capsule Take 50 mg by mouth four (4) times daily.       traZODone (DESYREL) 50 mg tablet Take 50 mg by mouth nightly.  Insulin Syringe-Needle U-100 0.3 mL 31 x 5/16\" syrg Use as directed twice daily 100 Syringe 11    ASCENSIA CONTOUR strip Use as directed three times daily. 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 No pertinent surgical history. Family History:  Family History   Problem Relation Age of Onset    Diabetes Maternal Grandmother     Hypertension Mother     Heart Disease Neg Hx     Stroke Neg Hx        Social History:  Social History   Substance Use Topics    Smoking status: Former Smoker    Smokeless tobacco: Never Used      Comment: may have a cigar with a glass of wine but no cigarettes    Alcohol use Yes      Comment: 1-2x week may have a lite beer or a glass of wine       Allergies:  No Known Allergies      Review of Systems   Review of Systems   Constitutional: Negative for chills, diaphoresis and fever. HENT: Negative for congestion, ear pain, rhinorrhea and sore throat. Respiratory: Negative for cough and shortness of breath. Cardiovascular: Negative for chest pain. Gastrointestinal: Negative for abdominal pain, constipation, diarrhea, nausea and vomiting. Genitourinary: Negative for difficulty urinating, dysuria, frequency and hematuria. See HPI   Musculoskeletal: Negative for arthralgias and myalgias. Neurological: Negative for headaches. All other systems reviewed and are negative. Physical Exam   Physical Exam   Constitutional: He is oriented to person, place, and time. He appears well-developed and well-nourished. No distress. Wheelchair bound 44 y.o.  -American male    HENT:   Head: Normocephalic and atraumatic. Eyes: Conjunctivae are normal. Right eye exhibits no discharge. Left eye exhibits no discharge. Neck: Normal range of motion. Neck supple. Cardiovascular: Normal rate, regular rhythm and normal heart sounds. No murmur heard. Pulmonary/Chest: Effort normal and breath sounds normal. No respiratory distress. Neurological: He is alert and oriented to person, place, and time. Skin: Skin is warm and dry. He is not diaphoretic. Psychiatric: He has a normal mood and affect. His behavior is normal.   Nursing note and vitals reviewed. Diagnostic Study Results     Labs -     Recent Results (from the past 12 hour(s))   URINALYSIS W/ REFLEX CULTURE    Collection Time: 09/09/18  4:50 PM   Result Value Ref Range    Color YELLOW/STRAW      Appearance TURBID (A) CLEAR      Specific gravity 1.024 1.003 - 1.030      pH (UA) 8.5 (H) 5.0 - 8.0      Protein 100 (A) NEG mg/dL    Glucose 250 (A) NEG mg/dL    Ketone NEGATIVE  NEG mg/dL    Bilirubin NEGATIVE  NEG      Blood MODERATE (A) NEG      Urobilinogen 0.2 0.2 - 1.0 EU/dL    Nitrites POSITIVE (A) NEG      Leukocyte Esterase LARGE (A) NEG      WBC 5-10 0 - 4 /hpf    RBC 5-10 0 - 5 /hpf    Epithelial cells FEW FEW /lpf    Bacteria 4+ (A) NEG /hpf    UA:UC IF INDICATED URINE CULTURE ORDERED (A) CNI         Radiologic Studies - None    Medical Decision Making   I am the first provider for this patient. I reviewed the vital signs, available nursing notes, past medical history, past surgical history, family history and social history. Vital Signs-Reviewed the patient's vital signs. Patient Vitals for the past 12 hrs:   Temp Pulse Resp BP SpO2   09/09/18 1600 - - - 129/79 100 %   09/09/18 1416 98.3 °F (36.8 °C) 60 16 (!) 170/116 100 %     Records Reviewed: Nursing Notes and Old Medical Records    Provider Notes (Medical Decision Making):   UTI, urinary obstruction, bonner catheter malfunction     ED Course:   Initial assessment performed.  The patients presenting problems have been discussed, and they are in agreement with the care plan formulated and outlined with them. I have encouraged them to ask questions as they arise throughout their visit.    4:21 PM  Case discussed with Carole Guerin MD who has evaluated the patient. Discussed option with patient. Will insert bonner catheter and have patient follow up with either home health or urology to change subrapubic catheter. 5:34 PM  The patient has been re-evaluated. Pt notes that he is feeling much better since the bonner has been placed. Pt back in wheelchair. Nursing staff provided a warm blanket. Still pending UA result. Critical Care Time:   None    Disposition:  DISCHARGE NOTE:  6:02 PM  The pt is ready for discharge. The pt's signs, symptoms, diagnosis, and discharge instructions have been discussed and pt has conveyed their understanding. The pt is to follow up as recommended or return to ER should their symptoms worsen. Plan has been discussed and pt is in agreement. PLAN:  1. Current Discharge Medication List      START taking these medications    Details   cephALEXin (KEFLEX) 500 mg capsule Take 1 Cap by mouth four (4) times daily for 7 days. Qty: 28 Cap, Refills: 0         CONTINUE these medications which have NOT CHANGED    Details   acetaminophen-codeine (TYLENOL-CODEINE #3) 300-30 mg per tablet Take 1 Tab by mouth every four (4) hours as needed for Pain. Max Daily Amount: 6 Tabs. Qty: 12 Tab, Refills: 0    Associated Diagnoses: Pain of tooth socket      insulin glargine (LANTUS) 100 unit/mL injection 36 Units by SubCUTAneous route nightly. Indications: TYPE 1 DIABETES MELLITUS      baclofen (LIORESAL) 20 mg tablet Take 20 mg by mouth three (3) times daily. dantrolene (DANTRIUM) 25 mg capsule Take 50 mg by mouth four (4) times daily. traZODone (DESYREL) 50 mg tablet Take 50 mg by mouth nightly.       Insulin Syringe-Needle U-100 0.3 mL 31 x 5/16\" syrg Use as directed twice daily  Qty: 100 Syringe, Refills: 11      ASCENSIA CONTOUR strip Use as directed three times daily. Dx: 250.03  Qty: 100 strip, Refills: 11           2. Follow-up Information     Follow up With Details Comments [id number] In 3 days or your urologist or home health nurse [place] [id number]  [phone]          Return to ED if worse     Diagnosis     Clinical Impression:   1.  Urinary tract infection associated with cystostomy catheter, initial encounter ([facility] [address]

## 2018-09-09 NOTE — ED NOTES
Assumed care of patient. Pt resting in position of comfort. Call bell within reach. Pt presents to ED with reports of slow drainage from suprapubic catheter, no urine output in leg back since noon on Saturday and notes that some urine is coming from his penis. Pt is c6 quadriplegic. States when he starts having urine draining from his penis and elevated BP, he knows he has a uti.

## 2018-09-09 NOTE — DISCHARGE INSTRUCTIONS

## 2018-09-11 LAB
BACTERIA SPEC CULT: ABNORMAL
CC UR VC: ABNORMAL
SERVICE CMNT-IMP: ABNORMAL

## 2018-09-11 RX ORDER — SULFAMETHOXAZOLE AND TRIMETHOPRIM 800; 160 MG/1; MG/1
1 TABLET ORAL 2 TIMES DAILY
Qty: 14 TAB | Refills: 0 | Status: SHIPPED | OUTPATIENT
Start: 2018-09-11 | End: 2018-09-18

## 2018-09-11 NOTE — PROGRESS NOTES
Called patient and discussed results. E-Rxed bactrim to preferred pharmacy. Has urology follow-up on Monday.

## 2019-01-25 ENCOUNTER — APPOINTMENT (OUTPATIENT)
Dept: CT IMAGING | Age: 39
End: 2019-01-25
Attending: STUDENT IN AN ORGANIZED HEALTH CARE EDUCATION/TRAINING PROGRAM
Payer: COMMERCIAL

## 2019-01-25 ENCOUNTER — HOSPITAL ENCOUNTER (EMERGENCY)
Age: 39
Discharge: HOME | End: 2019-01-25
Attending: EMERGENCY MEDICINE | Admitting: EMERGENCY MEDICINE
Payer: COMMERCIAL

## 2019-01-25 VITALS
SYSTOLIC BLOOD PRESSURE: 167 MMHG | BODY MASS INDEX: 24.48 KG/M2 | HEIGHT: 70 IN | OXYGEN SATURATION: 99 % | TEMPERATURE: 97.8 F | WEIGHT: 171 LBS | HEART RATE: 67 BPM | RESPIRATION RATE: 18 BRPM | DIASTOLIC BLOOD PRESSURE: 112 MMHG

## 2019-01-25 DIAGNOSIS — L02.91 ABSCESS: Primary | ICD-10-CM

## 2019-01-25 DIAGNOSIS — M62.838 MUSCLE SPASM: ICD-10-CM

## 2019-01-25 LAB
ALBUMIN SERPL-MCNC: 4 G/DL (ref 3.5–5)
ALBUMIN/GLOB SERPL: 1 {RATIO} (ref 1.1–2.2)
ALP SERPL-CCNC: 102 U/L (ref 45–117)
ALT SERPL-CCNC: 17 U/L (ref 12–78)
ANION GAP SERPL CALC-SCNC: 8 MMOL/L (ref 5–15)
APPEARANCE UR: CLEAR
AST SERPL-CCNC: 17 U/L (ref 15–37)
BACTERIA URNS QL MICRO: ABNORMAL /HPF
BASOPHILS # BLD: 0.1 K/UL (ref 0–0.1)
BASOPHILS NFR BLD: 1 % (ref 0–1)
BILIRUB SERPL-MCNC: 0.5 MG/DL (ref 0.2–1)
BILIRUB UR QL: NEGATIVE
BUN SERPL-MCNC: 6 MG/DL (ref 6–20)
BUN/CREAT SERPL: 7 (ref 12–20)
CALCIUM SERPL-MCNC: 9 MG/DL (ref 8.5–10.1)
CHLORIDE SERPL-SCNC: 99 MMOL/L (ref 97–108)
CO2 SERPL-SCNC: 27 MMOL/L (ref 21–32)
COLOR UR: ABNORMAL
CREAT SERPL-MCNC: 0.86 MG/DL (ref 0.7–1.3)
DIFFERENTIAL METHOD BLD: ABNORMAL
EOSINOPHIL # BLD: 0.2 K/UL (ref 0–0.4)
EOSINOPHIL NFR BLD: 2 % (ref 0–7)
EPITH CASTS URNS QL MICRO: ABNORMAL /LPF
ERYTHROCYTE [DISTWIDTH] IN BLOOD BY AUTOMATED COUNT: 15 % (ref 11.5–14.5)
GLOBULIN SER CALC-MCNC: 4 G/DL (ref 2–4)
GLUCOSE SERPL-MCNC: 259 MG/DL (ref 65–100)
GLUCOSE UR STRIP.AUTO-MCNC: 250 MG/DL
HCT VFR BLD AUTO: 42.9 % (ref 36.6–50.3)
HGB BLD-MCNC: 14.6 G/DL (ref 12.1–17)
HGB UR QL STRIP: ABNORMAL
IMM GRANULOCYTES # BLD AUTO: 0.1 K/UL (ref 0–0.04)
IMM GRANULOCYTES NFR BLD AUTO: 1 % (ref 0–0.5)
KETONES UR QL STRIP.AUTO: 15 MG/DL
LACTATE SERPL-SCNC: 1.2 MMOL/L (ref 0.4–2)
LEUKOCYTE ESTERASE UR QL STRIP.AUTO: ABNORMAL
LYMPHOCYTES # BLD: 1.1 K/UL (ref 0.8–3.5)
LYMPHOCYTES NFR BLD: 12 % (ref 12–49)
MCH RBC QN AUTO: 30.5 PG (ref 26–34)
MCHC RBC AUTO-ENTMCNC: 34 G/DL (ref 30–36.5)
MCV RBC AUTO: 89.6 FL (ref 80–99)
MONOCYTES # BLD: 0.5 K/UL (ref 0–1)
MONOCYTES NFR BLD: 5 % (ref 5–13)
NEUTS SEG # BLD: 7.3 K/UL (ref 1.8–8)
NEUTS SEG NFR BLD: 80 % (ref 32–75)
NITRITE UR QL STRIP.AUTO: POSITIVE
NRBC # BLD: 0 K/UL (ref 0–0.01)
NRBC BLD-RTO: 0 PER 100 WBC
PH UR STRIP: 8.5 [PH] (ref 5–8)
PLATELET # BLD AUTO: 221 K/UL (ref 150–400)
PMV BLD AUTO: 10.4 FL (ref 8.9–12.9)
POTASSIUM SERPL-SCNC: 4.3 MMOL/L (ref 3.5–5.1)
PROT SERPL-MCNC: 8 G/DL (ref 6.4–8.2)
PROT UR STRIP-MCNC: 30 MG/DL
RBC # BLD AUTO: 4.79 M/UL (ref 4.1–5.7)
RBC #/AREA URNS HPF: ABNORMAL /HPF (ref 0–5)
SODIUM SERPL-SCNC: 134 MMOL/L (ref 136–145)
SP GR UR REFRACTOMETRY: 1.01 (ref 1–1.03)
UA: UC IF INDICATED,UAUC: ABNORMAL
UROBILINOGEN UR QL STRIP.AUTO: 0.2 EU/DL (ref 0.2–1)
WBC # BLD AUTO: 9.2 K/UL (ref 4.1–11.1)
WBC URNS QL MICRO: ABNORMAL /HPF (ref 0–4)

## 2019-01-25 PROCEDURE — 74011250636 HC RX REV CODE- 250/636: Performed by: EMERGENCY MEDICINE

## 2019-01-25 PROCEDURE — 74011250637 HC RX REV CODE- 250/637: Performed by: STUDENT IN AN ORGANIZED HEALTH CARE EDUCATION/TRAINING PROGRAM

## 2019-01-25 PROCEDURE — 87147 CULTURE TYPE IMMUNOLOGIC: CPT

## 2019-01-25 PROCEDURE — 96361 HYDRATE IV INFUSION ADD-ON: CPT

## 2019-01-25 PROCEDURE — 72193 CT PELVIS W/DYE: CPT

## 2019-01-25 PROCEDURE — 74011250636 HC RX REV CODE- 250/636: Performed by: STUDENT IN AN ORGANIZED HEALTH CARE EDUCATION/TRAINING PROGRAM

## 2019-01-25 PROCEDURE — 36415 COLL VENOUS BLD VENIPUNCTURE: CPT

## 2019-01-25 PROCEDURE — 85025 COMPLETE CBC W/AUTO DIFF WBC: CPT

## 2019-01-25 PROCEDURE — 87077 CULTURE AEROBIC IDENTIFY: CPT

## 2019-01-25 PROCEDURE — 96365 THER/PROPH/DIAG IV INF INIT: CPT

## 2019-01-25 PROCEDURE — 87205 SMEAR GRAM STAIN: CPT

## 2019-01-25 PROCEDURE — 74011000258 HC RX REV CODE- 258: Performed by: STUDENT IN AN ORGANIZED HEALTH CARE EDUCATION/TRAINING PROGRAM

## 2019-01-25 PROCEDURE — 87086 URINE CULTURE/COLONY COUNT: CPT

## 2019-01-25 PROCEDURE — 99283 EMERGENCY DEPT VISIT LOW MDM: CPT

## 2019-01-25 PROCEDURE — 80053 COMPREHEN METABOLIC PANEL: CPT

## 2019-01-25 PROCEDURE — 83605 ASSAY OF LACTIC ACID: CPT

## 2019-01-25 PROCEDURE — 87040 BLOOD CULTURE FOR BACTERIA: CPT

## 2019-01-25 PROCEDURE — 81001 URINALYSIS AUTO W/SCOPE: CPT

## 2019-01-25 PROCEDURE — 74011636320 HC RX REV CODE- 636/320: Performed by: EMERGENCY MEDICINE

## 2019-01-25 PROCEDURE — 87186 SC STD MICRODIL/AGAR DIL: CPT

## 2019-01-25 RX ORDER — NYSTATIN 100000 [USP'U]/G
POWDER TOPICAL 4 TIMES DAILY
Qty: 1 BOTTLE | Refills: 0 | Status: SHIPPED | OUTPATIENT
Start: 2019-01-25 | End: 2021-01-12

## 2019-01-25 RX ORDER — SODIUM CHLORIDE 0.9 % (FLUSH) 0.9 %
10 SYRINGE (ML) INJECTION
Status: COMPLETED | OUTPATIENT
Start: 2019-01-25 | End: 2019-01-25

## 2019-01-25 RX ORDER — VANCOMYCIN/0.9 % SOD CHLORIDE 1.5G/250ML
1500 PLASTIC BAG, INJECTION (ML) INTRAVENOUS ONCE
Status: DISCONTINUED | OUTPATIENT
Start: 2019-01-25 | End: 2019-01-25

## 2019-01-25 RX ORDER — ONDANSETRON 4 MG/1
4 TABLET, ORALLY DISINTEGRATING ORAL
Status: COMPLETED | OUTPATIENT
Start: 2019-01-25 | End: 2019-01-25

## 2019-01-25 RX ORDER — SULFAMETHOXAZOLE AND TRIMETHOPRIM 800; 160 MG/1; MG/1
1 TABLET ORAL 2 TIMES DAILY
Qty: 14 TAB | Refills: 0 | Status: SHIPPED | OUTPATIENT
Start: 2019-01-25 | End: 2019-02-01

## 2019-01-25 RX ORDER — ONDANSETRON 4 MG/1
4 TABLET, ORALLY DISINTEGRATING ORAL
Qty: 10 TAB | Refills: 0 | Status: SHIPPED | OUTPATIENT
Start: 2019-01-25

## 2019-01-25 RX ADMIN — Medication 10 ML: at 21:04

## 2019-01-25 RX ADMIN — IOPAMIDOL 100 ML: 755 INJECTION, SOLUTION INTRAVENOUS at 21:04

## 2019-01-25 RX ADMIN — SODIUM CHLORIDE 1000 ML: 900 INJECTION, SOLUTION INTRAVENOUS at 20:25

## 2019-01-25 RX ADMIN — ONDANSETRON 4 MG: 4 TABLET, ORALLY DISINTEGRATING ORAL at 22:24

## 2019-01-25 RX ADMIN — PIPERACILLIN SODIUM,TAZOBACTAM SODIUM 3.38 G: 3; .375 INJECTION, POWDER, FOR SOLUTION INTRAVENOUS at 21:30

## 2019-01-25 NOTE — ED NOTES
42 y/o male with paraplegia and diabetes presents with 6-7 weeks of scrotal pain that has gotten progressively worse, despite topical ointments. He was referred by to come to the ED for suspected septic cellulitis, as evidenced by malodorous and purulent drainage from the site. He reports fever, abdominal cramping, sweats, nausea, and weakness.

## 2019-01-26 NOTE — ED PROVIDER NOTES
EMERGENCY DEPARTMENT HISTORY AND PHYSICAL EXAM      Date: 1/25/2019  Patient Name: Mark Anthony Orozco    History of Presenting Illness     Chief Complaint   Patient presents with    Skin Problem    Fever       History Provided By: Patient    HPI: Mark Anthony Orozco, 43 y.o. male with PMHx significant for C5/C6 paraplegia, diabetes, HTN, presents via EMS to the ED with cc of spasticity, fever and possible infection of groin. Patient states that he noticed a lesion in his right groin 1 month ago, had purulent drainage but did not see a care provider regarding starting antibiotics. Patient states that he noticed increasing spasticity and fever today which prompted him to come in for evaluation. Patient states that he has had some nausea, general malaise but denies any diarrhea. Patient denies any pain associated with complaint. Patient was initially evaluated at [facility], found to have a fever there and was advised to come to emergency department for evaluation. There are no other complaints, changes, or physical findings at this time. PCP: Roopa Chavira MD    No current facility-administered medications on file prior to encounter. Current Outpatient Medications on File Prior to Encounter   Medication Sig Dispense Refill    acetaminophen-codeine (TYLENOL-CODEINE #3) 300-30 mg per tablet Take 1 Tab by mouth every four (4) hours as needed for Pain. Max Daily Amount: 6 Tabs. 12 Tab 0    insulin glargine (LANTUS) 100 unit/mL injection 36 Units by SubCUTAneous route nightly. Indications: TYPE 1 DIABETES MELLITUS      baclofen (LIORESAL) 20 mg tablet Take 20 mg by mouth three (3) times daily.  dantrolene (DANTRIUM) 25 mg capsule Take 50 mg by mouth four (4) times daily.  traZODone (DESYREL) 50 mg tablet Take 50 mg by mouth nightly.  Insulin Syringe-Needle U-100 0.3 mL 31 x 5/16\" syrg Use as directed twice daily 100 Syringe 11    ASCENSIA CONTOUR strip Use as directed three times daily.   Dx: 250.03 100 strip 11       Past History     Past Medical History:  Past Medical History:   Diagnosis Date    Neurological disorder 2015    quad C 6-7    Type I (juvenile type) diabetes mellitus without mention of complication, uncontrolled Age 20    Unspecified vitamin D deficiency 5/7/2012       Past Surgical History:  No past surgical history on file. Family History:  Family History   Problem Relation Age of Onset    Diabetes Maternal Grandmother     Hypertension Mother     Heart Disease Neg Hx     Stroke Neg Hx        Social History:  Social History     Tobacco Use    Smoking status: Former Smoker    Smokeless tobacco: Never Used    Tobacco comment: may have a cigar with a glass of wine but no cigarettes   Substance Use Topics    Alcohol use: Yes     Comment: 1-2x week may have a lite beer or a glass of wine    Drug use: No       Allergies:  No Known Allergies      Review of Systems   Review of Systems   Constitutional: Positive for fatigue. Negative for appetite change, chills and fever. HENT: Negative for sore throat. Respiratory: Negative for apnea, chest tightness, shortness of breath and wheezing. Cardiovascular: Negative for chest pain, palpitations and leg swelling. Gastrointestinal: Positive for nausea. Negative for abdominal pain, diarrhea and vomiting. Genitourinary: Positive for testicular pain. Negative for discharge and hematuria. Musculoskeletal: Negative for joint swelling. Spasticity   Skin:        Erythema and pustule with purulent drainage in groin   Neurological: Negative for headaches. Psychiatric/Behavioral: Negative for confusion. Physical Exam   Physical Exam   Constitutional: He is oriented to person, place, and time. He appears well-developed and well-nourished. HENT:   Head: Normocephalic and atraumatic. Eyes: Conjunctivae are normal.   Cardiovascular: Normal rate, regular rhythm and normal heart sounds.    Pulmonary/Chest: Effort normal. No respiratory distress. He has no wheezes. Abdominal: Soft. He exhibits distension. He exhibits no mass. There is no tenderness. Genitourinary: Penis normal.   Genitourinary Comments: Erythema in inguinal folds. Pustular lesion noted on right side of groin with some purulent drainage   Musculoskeletal: He exhibits no edema or deformity. No voluntary LE movement   Neurological: He is alert and oriented to person, place, and time. Skin: Skin is warm and dry. No rash noted. Psychiatric: He has a normal mood and affect. Diagnostic Study Results     Labs -     Recent Results (from the past 12 hour(s))   CBC WITH AUTOMATED DIFF    Collection Time: 01/25/19  7:47 PM   Result Value Ref Range    WBC 9.2 4.1 - 11.1 K/uL    RBC 4.79 4.10 - 5.70 M/uL    HGB 14.6 12.1 - 17.0 g/dL    HCT 42.9 36.6 - 50.3 %    MCV 89.6 80.0 - 99.0 FL    MCH 30.5 26.0 - 34.0 PG    MCHC 34.0 30.0 - 36.5 g/dL    RDW 15.0 (H) 11.5 - 14.5 %    PLATELET 196 147 - 822 K/uL    MPV 10.4 8.9 - 12.9 FL    NRBC 0.0 0  WBC    ABSOLUTE NRBC 0.00 0.00 - 0.01 K/uL    NEUTROPHILS 80 (H) 32 - 75 %    LYMPHOCYTES 12 12 - 49 %    MONOCYTES 5 5 - 13 %    EOSINOPHILS 2 0 - 7 %    BASOPHILS 1 0 - 1 %    IMMATURE GRANULOCYTES 1 (H) 0.0 - 0.5 %    ABS. NEUTROPHILS 7.3 1.8 - 8.0 K/UL    ABS. LYMPHOCYTES 1.1 0.8 - 3.5 K/UL    ABS. MONOCYTES 0.5 0.0 - 1.0 K/UL    ABS. EOSINOPHILS 0.2 0.0 - 0.4 K/UL    ABS. BASOPHILS 0.1 0.0 - 0.1 K/UL    ABS. IMM.  GRANS. 0.1 (H) 0.00 - 0.04 K/UL    DF AUTOMATED     METABOLIC PANEL, COMPREHENSIVE    Collection Time: 01/25/19  7:47 PM   Result Value Ref Range    Sodium 134 (L) 136 - 145 mmol/L    Potassium 4.3 3.5 - 5.1 mmol/L    Chloride 99 97 - 108 mmol/L    CO2 27 21 - 32 mmol/L    Anion gap 8 5 - 15 mmol/L    Glucose 259 (H) 65 - 100 mg/dL    BUN 6 6 - 20 MG/DL    Creatinine 0.86 0.70 - 1.30 MG/DL    BUN/Creatinine ratio 7 (L) 12 - 20      GFR est AA >60 >60 ml/min/1.73m2    GFR est non-AA >60 >60 ml/min/1.73m2 Calcium 9.0 8.5 - 10.1 MG/DL    Bilirubin, total 0.5 0.2 - 1.0 MG/DL    ALT (SGPT) 17 12 - 78 U/L    AST (SGOT) 17 15 - 37 U/L    Alk. phosphatase 102 45 - 117 U/L    Protein, total 8.0 6.4 - 8.2 g/dL    Albumin 4.0 3.5 - 5.0 g/dL    Globulin 4.0 2.0 - 4.0 g/dL    A-G Ratio 1.0 (L) 1.1 - 2.2     LACTIC ACID    Collection Time: 01/25/19  7:47 PM   Result Value Ref Range    Lactic acid 1.2 0.4 - 2.0 MMOL/L   URINALYSIS W/ REFLEX CULTURE    Collection Time: 01/25/19  7:50 PM   Result Value Ref Range    Color YELLOW/STRAW      Appearance CLEAR CLEAR      Specific gravity 1.015 1.003 - 1.030      pH (UA) 8.5 (H) 5.0 - 8.0      Protein 30 (A) NEG mg/dL    Glucose 250 (A) NEG mg/dL    Ketone 15 (A) NEG mg/dL    Bilirubin NEGATIVE  NEG      Blood TRACE (A) NEG      Urobilinogen 0.2 0.2 - 1.0 EU/dL    Nitrites POSITIVE (A) NEG      Leukocyte Esterase MODERATE (A) NEG      WBC 10-20 0 - 4 /hpf    RBC 5-10 0 - 5 /hpf    Epithelial cells FEW FEW /lpf    Bacteria 2+ (A) NEG /hpf    UA:UC IF INDICATED URINE CULTURE ORDERED (A) CNI         Radiologic Studies -   CT PELV W CONT   Final Result   IMPRESSION: No CT evidence of complication or underlying cause of inguinal   erythema/cellulitis. CT Results  (Last 48 hours)               01/25/19 2105  CT PELV W CONT Final result    Impression:  IMPRESSION: No CT evidence of complication or underlying cause of inguinal   erythema/cellulitis. Narrative:  EXAM: CT PELV W CONT       INDICATION: groin erythema and cellulitis, rule out Desiree's gangrene       COMPARISON: None. CONTRAST: 100 mL of Isovue-370. TECHNIQUE:    Multislice helical CT was performed from the iliac crest to the symphysis pubis   during uneventful rapid bolus intravenous contrast administration. Oral   contrast was not administered. Coronal and sagittal reformations were   generated.   CT dose reduction was achieved through use of a standardized   protocol tailored for this examination and automatic exposure control for dose   modulation. FINDINGS:       There is no significant deep inflammation or collection in the perineum or   inguinal regions. There is no soft tissue gas fistula, or collection. The visualized bowel within the pelvis is normal.        The seminal vesicles and prostate appear normal..        There is no pelvic mass or adenopathy. There is no pelvic ascites or fluid   collection. There is no fracture or other osseous abnormality of the pelvis or hips. Suprapubic catheter noted with ballon in urinary bladder. CXR Results  (Last 48 hours)    None            Medical Decision Making   I am the first provider for this patient. I reviewed the vital signs, available nursing notes, past medical history, past surgical history, family history and social history. Vital Signs-Reviewed the patient's vital signs. Patient Vitals for the past 12 hrs:   Temp Pulse Resp BP SpO2   01/25/19 2026 97.8 °F (36.6 °C) 67 18 (!) 167/112 99 %       Pulse Oximetry Analysis - 100% on RA    Records Reviewed: Nursing Notes, Old Medical Records, Previous Radiology Studies and Previous Laboratory Studies    Provider Notes (Medical Decision Making):   44 yo M w/ hx of paraplegia here with complaint of groin infection. Patient noticed pustule bump 1 month ago, had some purulent drainage and has since had increased malaise and spasticity. Patient denies any vomiting however does endorse some nausea. Given [facility], will obtain labs and blood culture. Will order IV abx as well. Will obtain CT scan of groin to rule out gas indicative of Desiree's. Ddx also includes abscess, cellulitis, bacteremia, yeast infection. ED Course:   Initial assessment performed. The patients presenting problems have been discussed, and they are in agreement with the care plan formulated and outlined with them.   I have encouraged them to ask questions as they arise throughout their visit. PROGRESS NOTE:  9:48 PM  Patient likely with abscess, CT negative for deeper soft tissue infection. Will d/c vancomycin at this time and have patient take PO abx outpatient. Discussed with patient, patient agrees and understands. Suspect that increased muscle spasms likely secondary to patient infection. Patient is on high central nervous system muscle relaxants and therefore will not prescribe additional medication at this time. Discussed calling neurology for follow up at [facility]. Patient is agreeable to this plan. DISCHARGE NOTE  9:50 PM  The patient has been re-evaluated and is ready for discharge. Reviewed available results with patient. Counseled pt on diagnosis and care plan. Pt has expressed understanding, and all questions have been answered. Pt agrees with plan and agrees to F/U as recommended, or return to the ED if their sxs worsen. Discharge instructions have been provided and explained to the pt, along with reasons to return to the ED. Disposition:  Home    PLAN:  1. Current Discharge Medication List      START taking these medications    Details   trimethoprim-sulfamethoxazole (BACTRIM DS) 160-800 mg per tablet Take 1 Tab by mouth two (2) times a day for 7 days. Qty: 14 Tab, Refills: 0         CONTINUE these medications which have NOT CHANGED    Details   acetaminophen-codeine (TYLENOL-CODEINE #3) 300-30 mg per tablet Take 1 Tab by mouth every four (4) hours as needed for Pain. Max Daily Amount: 6 Tabs. Qty: 12 Tab, Refills: 0    Associated Diagnoses: Pain of tooth socket      insulin glargine (LANTUS) 100 unit/mL injection 36 Units by SubCUTAneous route nightly. Indications: TYPE 1 DIABETES MELLITUS      baclofen (LIORESAL) 20 mg tablet Take 20 mg by mouth three (3) times daily. dantrolene (DANTRIUM) 25 mg capsule Take 50 mg by mouth four (4) times daily. traZODone (DESYREL) 50 mg tablet Take 50 mg by mouth nightly.       Insulin Syringe-Needle U-100 0.3 mL 31 x 5/16\" syrg Use as directed twice daily  Qty: 100 Syringe, Refills: 11      ASCENSIA CONTOUR strip Use as directed three times daily. Dx: 250.03  Qty: 100 strip, Refills: 11           2. Follow-up Information     Follow up With Specialties Details Why Contact Info    Kaden Washington MD Internal Medicine   [facility] [address] [phone] [address] [facility] Neurology   to your spinal doctor regarding your spasm if you do not have improvement with antibiotics [address]  [phone]        Return to ED if worse     Diagnosis     Clinical Impression:   1. Abscess    2.  Muscle spasm        Attestations:

## 2019-01-26 NOTE — ED NOTES
ER PA discharged patient. Patient verbalized an understanding of discharge paperwork and has no other questions at the time of discharge.

## 2019-01-26 NOTE — DISCHARGE INSTRUCTIONS

## 2019-01-27 LAB
BACTERIA SPEC CULT: ABNORMAL
CC UR VC: ABNORMAL
GRAM STN SPEC: ABNORMAL
GRAM STN SPEC: ABNORMAL
SERVICE CMNT-IMP: ABNORMAL
SERVICE CMNT-IMP: ABNORMAL

## 2019-01-28 RX ORDER — AMOXICILLIN 875 MG/1
875 TABLET, FILM COATED ORAL 2 TIMES DAILY
Qty: 14 TAB | Refills: 0 | Status: SHIPPED | OUTPATIENT
Start: 2019-01-28 | End: 2019-02-04

## 2019-01-28 NOTE — PROGRESS NOTES
Contacted patient, reviewed would culture results. Pt reports he hasn't noted much change in the wound but has not tolerated the Bactrim well stating \"yesterday I had a hard time\" \"I felt really sick\". Pt encouraged to discontinue the Bactrim and Amoxicillin efiled with the [facility] [address] at his request.  Advised patient to return to the Emergency Dept for any worsening pain, redness, swelling, drainage or fever.

## 2019-01-30 LAB
BACTERIA SPEC CULT: NORMAL
SERVICE CMNT-IMP: NORMAL

## 2019-03-14 ENCOUNTER — HOSPITAL ENCOUNTER (EMERGENCY)
Age: 39
Discharge: HOME | End: 2019-03-14
Attending: EMERGENCY MEDICINE | Admitting: EMERGENCY MEDICINE
Payer: COMMERCIAL

## 2019-03-14 ENCOUNTER — APPOINTMENT (OUTPATIENT)
Dept: GENERAL RADIOLOGY | Age: 39
End: 2019-03-14
Attending: EMERGENCY MEDICINE
Payer: COMMERCIAL

## 2019-03-14 VITALS
DIASTOLIC BLOOD PRESSURE: 108 MMHG | TEMPERATURE: 98.1 F | RESPIRATION RATE: 14 BRPM | SYSTOLIC BLOOD PRESSURE: 177 MMHG | HEIGHT: 71 IN | OXYGEN SATURATION: 99 % | HEART RATE: 84 BPM | BODY MASS INDEX: 23.94 KG/M2 | WEIGHT: 171 LBS

## 2019-03-14 DIAGNOSIS — S80.11XA CONTUSION OF RIGHT KNEE AND LOWER LEG, INITIAL ENCOUNTER: Primary | ICD-10-CM

## 2019-03-14 DIAGNOSIS — S80.01XA CONTUSION OF RIGHT KNEE AND LOWER LEG, INITIAL ENCOUNTER: Primary | ICD-10-CM

## 2019-03-14 PROCEDURE — 73610 X-RAY EXAM OF ANKLE: CPT

## 2019-03-14 PROCEDURE — 87186 SC STD MICRODIL/AGAR DIL: CPT

## 2019-03-14 PROCEDURE — 73590 X-RAY EXAM OF LOWER LEG: CPT

## 2019-03-14 PROCEDURE — 87086 URINE CULTURE/COLONY COUNT: CPT

## 2019-03-14 PROCEDURE — 87077 CULTURE AEROBIC IDENTIFY: CPT

## 2019-03-14 PROCEDURE — 73630 X-RAY EXAM OF FOOT: CPT

## 2019-03-14 PROCEDURE — 99283 EMERGENCY DEPT VISIT LOW MDM: CPT

## 2019-03-14 PROCEDURE — 73620 X-RAY EXAM OF FOOT: CPT

## 2019-03-14 NOTE — ED PROVIDER NOTES
EMERGENCY DEPARTMENT HISTORY AND PHYSICAL EXAM      Date: 3/14/2019  Patient Name: Manasa Rouse    History of Presenting Illness     Chief Complaint   Patient presents with    Leg Injury     Arrives via motorized wheelchair Pt states earlier today he had \"spasm\" which caused his R leg to come off WC and pt states he rolled over leg with WC Pt is Quad so has no feeling and can't describe if anything hurts Pt does report that usually his spasms are indicitative of UTI       History Provided By: Patient    HPI: Manasa Rouse, 43 y.o. male with PMHx significant for quadraplegia, presents via wheelchair to the ED with cc of R leg injury which occurred today. Pt states while at work trying to go through a turnstile his foot fell from it's pedestal and got run over by his wheelchair before he noticed that it had fallen. Pt states that his foot has been spasmodic recently and states this usually occurs when he has a UTI. Pt has a suprapubic catheter and states that it it is due to be replaced in 1 week. Pt denies any CP, SOB, n/v/d, fever    There are no other complaints, changes, or physical findings at this time. PCP: Marissa Meyers MD    No current facility-administered medications on file prior to encounter. Current Outpatient Medications on File Prior to Encounter   Medication Sig Dispense Refill    nystatin (MYCOSTATIN) powder Apply  to affected area four (4) times daily. 1 Bottle 0    ondansetron (ZOFRAN ODT) 4 mg disintegrating tablet Take 1 Tab by mouth every eight (8) hours as needed for Nausea. 10 Tab 0    insulin glargine (LANTUS) 100 unit/mL injection 36 Units by SubCUTAneous route nightly. Indications: TYPE 1 DIABETES MELLITUS      baclofen (LIORESAL) 20 mg tablet Take 20 mg by mouth three (3) times daily.  dantrolene (DANTRIUM) 25 mg capsule Take 50 mg by mouth four (4) times daily.  traZODone (DESYREL) 50 mg tablet Take 50 mg by mouth nightly.       Insulin Syringe-Needle U-100 0.3 mL 31 x 5/16\" syrg Use as directed twice daily 100 Syringe 11    ASCENSIA CONTOUR strip Use as directed three times daily. 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 No pertinent surgical history. Family History:  Family History   Problem Relation Age of Onset    Diabetes Maternal Grandmother     Hypertension Mother     Heart Disease Neg Hx     Stroke Neg Hx        Social History:  Social History     Tobacco Use    Smoking status: Former Smoker    Smokeless tobacco: Never Used    Tobacco comment: may have a cigar with a glass of wine but no cigarettes   Substance Use Topics    Alcohol use: Yes     Comment: 1-2x week may have a lite beer or a glass of wine    Drug use: No       Allergies:  No Known Allergies      Review of Systems   Review of Systems   Constitutional: Negative for chills and fever. HENT: Negative for congestion, rhinorrhea and sore throat. Respiratory: Negative for cough and shortness of breath. Cardiovascular: Negative for chest pain. Gastrointestinal: Negative for abdominal pain, diarrhea, nausea and vomiting. Genitourinary: Negative for dysuria and urgency. Musculoskeletal:        +R Foot injury   Skin: Negative for rash. Neurological: Negative for dizziness, light-headedness and headaches. All other systems reviewed and are negative. Physical Exam   Physical Exam   Constitutional: He is oriented to person, place, and time. He appears well-developed and well-nourished. No distress. HENT:   Head: Normocephalic and atraumatic. Eyes: Conjunctivae and EOM are normal. Pupils are equal, round, and reactive to light. Neck: Normal range of motion.    Cardiovascular: Normal rate, regular rhythm and intact distal pulses. Pulmonary/Chest: Effort normal and breath sounds normal. No stridor. No respiratory distress. Abdominal: Soft. He exhibits no distension. There is no tenderness. Musculoskeletal: Normal range of motion. Intact DP pulses  No obvious signs of trauma to RLE   Neurological: He is alert and oriented to person, place, and time. Skin: Skin is warm and dry. Psychiatric: He has a normal mood and affect. Nursing note and vitals reviewed. Diagnostic Study Results     Labs -   No results found for this or any previous visit (from the past 12 hour(s)). Radiologic Studies -   XR FOOT RT AP/LAT   Final Result   IMPRESSION:    Diffuse osteopenia. No acute fracture or dislocation. XR ANKLE RT MIN 3 V   Final Result   IMPRESSION: No acute abnormality. XR TIB/FIB RT   Final Result   IMPRESSION: No acute abnormality. CT Results  (Last 48 hours)    None        CXR Results  (Last 48 hours)    None            Medical Decision Making   I am the first provider for this patient. I reviewed the vital signs, available nursing notes, past medical history, past surgical history, family history and social history. Vital Signs-Reviewed the patient's vital signs. Patient Vitals for the past 12 hrs:   Temp Pulse Resp BP SpO2   03/14/19 1800 -- -- -- (!) 177/108 --   03/14/19 1733 98.1 °F (36.7 °C) 84 14 148/90 99 %       Pulse Oximetry Analysis - 99% on RA    Cardiac Monitor:   Rate: 84 bpm    Records Reviewed: Nursing Notes and Old Medical Records    Provider Notes (Medical Decision Making):   Patient presents with trauma to the left lower extremity. Given that the patient is a quadriplegic, he is unable to tell if there is any pain on exam.  Plan for x-rays of the right lower extremity. Also send a urine culture. ED Course:   Initial assessment performed.  The patients presenting problems have been discussed, and they are in agreement with the care plan formulated and outlined with them. I have encouraged them to ask questions as they arise throughout their visit. X-rays with no acute fracture. Urine culture sent. Given that the patient has no other systemic signs of infection, discussed waiting for culture results prior to treating with antibiotics. Patient is comfortable with this plan. Also advised that when he gets his suprapubic catheter changed, they check an additional urine specimen at that time. Critical Care Time:   0    Disposition:  DISCHARGE NOTE  8:20 PM  The patient has been re-evaluated and is ready for discharge. Reviewed available results with patient. Counseled pt on diagnosis and care plan. Pt has expressed understanding, and all questions have been answered. Pt agrees with plan and agrees to F/U as recommended, or return to the ED if their sxs worsen. Discharge instructions have been provided and explained to the pt, along with reasons to return to the ED. PLAN:  1. Current Discharge Medication List        2. Follow-up Information     Follow up With Specialties Details Why Contact Info    Dennis Porter MD Internal Medicine Schedule an appointment as soon as possible for a visit  [place] [address]  [phone]      [facility] EMERGENCY DEPT Emergency Medicine  As needed, If symptoms worsen [address]  [address] [facility]  [phone]        Return to ED if worse     Diagnosis     Clinical Impression:   1. Contusion of right knee and lower leg, initial encounter        Attestations: This note is prepared by Brie Adam, acting as Scribe for Isidra Luna MD.    The scribe's documentation has been prepared under my direction and personally reviewed by me in its entirety. I confirm that the note above accurately reflects all work, treatment, procedures, and medical decision making performed by me, Isidra Luna MD

## 2019-03-14 NOTE — ED NOTES
Patient states he rolled over his foot with his motorized chair. Patient unable to feel whether or not he is in pain. Patien is in no apparent distress at this time. A&O x4.

## 2019-03-15 NOTE — DISCHARGE INSTRUCTIONS
Patient Education        Contusion: Care Instructions  Your Care Instructions    Contusion is the medical term for a bruise. It is the result of a direct blow or an impact, such as a fall. Contusions are common sports injuries. Most people think of a bruise as a black-and-blue spot. This happens when small blood vessels get torn and leak blood under the skin. But bones, muscles, and organs can also get bruised. This may damage deep tissues but not cause a bruise you can see. The doctor will do a physical exam to find the location of your contusion. You may also have tests to make sure you do not have a more serious injury, such as a broken bone or nerve damage. These may include X-rays or other imaging tests like a CT scan or MRI. Deep-tissue contusions may cause pain and swelling. But if there is no serious damage, they will often get better in a few weeks with home treatment. The doctor has checked you carefully, but problems can develop later. If you notice any problems or new symptoms, get medical treatment right away. Follow-up care is a key part of your treatment and safety. Be sure to make and go to all appointments, and call your doctor if you are having problems. It's also a good idea to know your test results and keep a list of the medicines you take. How can you care for yourself at home? · Put ice or a cold pack on the sore area for 10 to 20 minutes at a time to stop swelling. Put a thin cloth between the ice pack and your skin. · Be safe with medicines. Read and follow all instructions on the label. ? If the doctor gave you a prescription medicine for pain, take it as prescribed. ? If you are not taking a prescription pain medicine, ask your doctor if you can take an over-the-counter medicine. · If you can, prop up the sore area on pillows as much as possible for the next few days. Try to keep the sore area above the level of your heart. When should you call for help?   Call your doctor now or seek immediate medical care if:    · Your pain gets worse.     · You have new or worse swelling.     · You have tingling, weakness, or numbness in the area near the contusion.     · The area near the contusion is cold or pale.    Watch closely for changes in your health, and be sure to contact your doctor if:    · You do not get better as expected. Where can you learn more? Go to http://philomena-shania.info/. Enter O374 in the search box to learn more about \"Contusion: Care Instructions. \"  Current as of: September 23, 2018  Content Version: 11.9  © 8297-2212 [employer]. Care instructions adapted under license by [employer] (which disclaims liability or warranty for this information). If you have questions about a medical condition or this instruction, always ask your healthcare professional. [address] any warranty or liability for your use of this information.

## 2019-03-16 RX ORDER — NITROFURANTOIN 25; 75 MG/1; MG/1
100 CAPSULE ORAL 2 TIMES DAILY
Qty: 10 CAP | Refills: 0 | Status: SHIPPED | OUTPATIENT
Start: 2019-03-16 | End: 2019-03-17

## 2019-03-17 LAB
BACTERIA SPEC CULT: ABNORMAL
BACTERIA SPEC CULT: ABNORMAL
CC UR VC: ABNORMAL
SERVICE CMNT-IMP: ABNORMAL

## 2019-03-17 RX ORDER — CIPROFLOXACIN 500 MG/1
500 TABLET ORAL 2 TIMES DAILY
Qty: 14 TAB | Refills: 0 | Status: SHIPPED | OUTPATIENT
Start: 2019-03-17 | End: 2019-03-24

## 2019-05-02 ENCOUNTER — HOSPITAL ENCOUNTER (EMERGENCY)
Age: 39
Discharge: HOME | End: 2019-05-02
Attending: STUDENT IN AN ORGANIZED HEALTH CARE EDUCATION/TRAINING PROGRAM
Payer: COMMERCIAL

## 2019-05-02 VITALS
WEIGHT: 160 LBS | SYSTOLIC BLOOD PRESSURE: 168 MMHG | DIASTOLIC BLOOD PRESSURE: 110 MMHG | TEMPERATURE: 98.6 F | OXYGEN SATURATION: 100 % | BODY MASS INDEX: 22.32 KG/M2 | HEART RATE: 86 BPM | RESPIRATION RATE: 16 BRPM

## 2019-05-02 DIAGNOSIS — R25.2 SPASTICITY: Primary | ICD-10-CM

## 2019-05-02 LAB
ALBUMIN SERPL-MCNC: 3.6 G/DL (ref 3.5–5)
ALBUMIN/GLOB SERPL: 1 {RATIO} (ref 1.1–2.2)
ALP SERPL-CCNC: 85 U/L (ref 45–117)
ALT SERPL-CCNC: 16 U/L (ref 12–78)
ANION GAP SERPL CALC-SCNC: 6 MMOL/L (ref 5–15)
APPEARANCE UR: ABNORMAL
AST SERPL-CCNC: 17 U/L (ref 15–37)
BACTERIA URNS QL MICRO: ABNORMAL /HPF
BASOPHILS # BLD: 0.1 K/UL (ref 0–0.1)
BASOPHILS NFR BLD: 1 % (ref 0–1)
BILIRUB SERPL-MCNC: 0.5 MG/DL (ref 0.2–1)
BILIRUB UR QL: NEGATIVE
BUN SERPL-MCNC: 4 MG/DL (ref 6–20)
BUN/CREAT SERPL: 5 (ref 12–20)
CALCIUM SERPL-MCNC: 8.9 MG/DL (ref 8.5–10.1)
CAOX CRY URNS QL MICRO: ABNORMAL
CHLORIDE SERPL-SCNC: 100 MMOL/L (ref 97–108)
CK SERPL-CCNC: 155 U/L (ref 39–308)
CO2 SERPL-SCNC: 26 MMOL/L (ref 21–32)
COLOR UR: ABNORMAL
CREAT SERPL-MCNC: 0.82 MG/DL (ref 0.7–1.3)
DIFFERENTIAL METHOD BLD: ABNORMAL
EOSINOPHIL # BLD: 0.2 K/UL (ref 0–0.4)
EOSINOPHIL NFR BLD: 3 % (ref 0–7)
EPITH CASTS URNS QL MICRO: ABNORMAL /LPF
ERYTHROCYTE [DISTWIDTH] IN BLOOD BY AUTOMATED COUNT: 16.8 % (ref 11.5–14.5)
GLOBULIN SER CALC-MCNC: 3.6 G/DL (ref 2–4)
GLUCOSE SERPL-MCNC: 261 MG/DL (ref 65–100)
GLUCOSE UR STRIP.AUTO-MCNC: >1000 MG/DL
HCT VFR BLD AUTO: 41.9 % (ref 36.6–50.3)
HGB BLD-MCNC: 14 G/DL (ref 12.1–17)
HGB UR QL STRIP: ABNORMAL
HYALINE CASTS URNS QL MICRO: ABNORMAL /LPF (ref 0–5)
IMM GRANULOCYTES # BLD AUTO: 0 K/UL (ref 0–0.04)
IMM GRANULOCYTES NFR BLD AUTO: 0 % (ref 0–0.5)
KETONES UR QL STRIP.AUTO: 15 MG/DL
LEUKOCYTE ESTERASE UR QL STRIP.AUTO: ABNORMAL
LIPASE SERPL-CCNC: 54 U/L (ref 73–393)
LYMPHOCYTES # BLD: 1.5 K/UL (ref 0.8–3.5)
LYMPHOCYTES NFR BLD: 23 % (ref 12–49)
MAGNESIUM SERPL-MCNC: 1.9 MG/DL (ref 1.6–2.4)
MCH RBC QN AUTO: 29.4 PG (ref 26–34)
MCHC RBC AUTO-ENTMCNC: 33.4 G/DL (ref 30–36.5)
MCV RBC AUTO: 88 FL (ref 80–99)
MONOCYTES # BLD: 0.6 K/UL (ref 0–1)
MONOCYTES NFR BLD: 9 % (ref 5–13)
MUCOUS THREADS URNS QL MICRO: ABNORMAL /LPF
NEUTS SEG # BLD: 4.2 K/UL (ref 1.8–8)
NEUTS SEG NFR BLD: 64 % (ref 32–75)
NITRITE UR QL STRIP.AUTO: POSITIVE
NRBC # BLD: 0 K/UL (ref 0–0.01)
NRBC BLD-RTO: 0 PER 100 WBC
PH UR STRIP: 6 [PH] (ref 5–8)
PLATELET # BLD AUTO: 279 K/UL (ref 150–400)
PMV BLD AUTO: 10.6 FL (ref 8.9–12.9)
POTASSIUM SERPL-SCNC: 3.7 MMOL/L (ref 3.5–5.1)
PROT SERPL-MCNC: 7.2 G/DL (ref 6.4–8.2)
PROT UR STRIP-MCNC: 30 MG/DL
RBC # BLD AUTO: 4.76 M/UL (ref 4.1–5.7)
RBC #/AREA URNS HPF: ABNORMAL /HPF (ref 0–5)
SODIUM SERPL-SCNC: 132 MMOL/L (ref 136–145)
SP GR UR REFRACTOMETRY: 1.02 (ref 1–1.03)
UROBILINOGEN UR QL STRIP.AUTO: 0.2 EU/DL (ref 0.2–1)
WBC # BLD AUTO: 6.4 K/UL (ref 4.1–11.1)
WBC URNS QL MICRO: ABNORMAL /HPF (ref 0–4)

## 2019-05-02 PROCEDURE — 87086 URINE CULTURE/COLONY COUNT: CPT

## 2019-05-02 PROCEDURE — 87147 CULTURE TYPE IMMUNOLOGIC: CPT

## 2019-05-02 PROCEDURE — 82550 ASSAY OF CK (CPK): CPT

## 2019-05-02 PROCEDURE — 99283 EMERGENCY DEPT VISIT LOW MDM: CPT

## 2019-05-02 PROCEDURE — 87077 CULTURE AEROBIC IDENTIFY: CPT

## 2019-05-02 PROCEDURE — 87186 SC STD MICRODIL/AGAR DIL: CPT

## 2019-05-02 PROCEDURE — 85025 COMPLETE CBC W/AUTO DIFF WBC: CPT

## 2019-05-02 PROCEDURE — 96375 TX/PRO/DX INJ NEW DRUG ADDON: CPT

## 2019-05-02 PROCEDURE — 81001 URINALYSIS AUTO W/SCOPE: CPT

## 2019-05-02 PROCEDURE — 83735 ASSAY OF MAGNESIUM: CPT

## 2019-05-02 PROCEDURE — 96374 THER/PROPH/DIAG INJ IV PUSH: CPT

## 2019-05-02 PROCEDURE — 36415 COLL VENOUS BLD VENIPUNCTURE: CPT

## 2019-05-02 PROCEDURE — 80053 COMPREHEN METABOLIC PANEL: CPT

## 2019-05-02 PROCEDURE — 83690 ASSAY OF LIPASE: CPT

## 2019-05-02 PROCEDURE — 74011250636 HC RX REV CODE- 250/636: Performed by: STUDENT IN AN ORGANIZED HEALTH CARE EDUCATION/TRAINING PROGRAM

## 2019-05-02 RX ORDER — DIAZEPAM 5 MG/1
5 TABLET ORAL
Qty: 12 TAB | Refills: 0 | Status: ON HOLD | OUTPATIENT
Start: 2019-05-02 | End: 2019-12-15

## 2019-05-02 RX ORDER — ONDANSETRON 2 MG/ML
4 INJECTION INTRAMUSCULAR; INTRAVENOUS
Status: COMPLETED | OUTPATIENT
Start: 2019-05-02 | End: 2019-05-02

## 2019-05-02 RX ORDER — LORAZEPAM 2 MG/ML
1 INJECTION INTRAMUSCULAR
Status: COMPLETED | OUTPATIENT
Start: 2019-05-02 | End: 2019-05-02

## 2019-05-02 RX ORDER — CEPHALEXIN 500 MG/1
500 CAPSULE ORAL 2 TIMES DAILY
Qty: 14 CAP | Refills: 0 | Status: SHIPPED | OUTPATIENT
Start: 2019-05-02 | End: 2019-05-09

## 2019-05-02 RX ADMIN — LORAZEPAM 1 MG: 2 INJECTION INTRAMUSCULAR; INTRAVENOUS at 18:12

## 2019-05-02 RX ADMIN — ONDANSETRON 4 MG: 2 INJECTION INTRAMUSCULAR; INTRAVENOUS at 18:25

## 2019-05-02 NOTE — ED NOTES
Pt experiencing muscle spasms, says the spasm is \"not as intense as they have been\". denies nausea.
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
## 2019-05-03 NOTE — DISCHARGE INSTRUCTIONS
Return to the ED if your symptoms worsen, or you develop worsening abdominal pain, vomiting, nausea, fever.

## 2019-05-05 LAB
BACTERIA SPEC CULT: ABNORMAL
CC UR VC: ABNORMAL
SERVICE CMNT-IMP: ABNORMAL

## 2019-05-06 RX ORDER — CIPROFLOXACIN 500 MG/1
500 TABLET ORAL 2 TIMES DAILY
Qty: 14 TAB | Refills: 0 | Status: SHIPPED | OUTPATIENT
Start: 2019-05-06 | End: 2019-05-13

## 2019-05-06 NOTE — PROGRESS NOTES
Spoke with patient concerning result. Informed to stop keflex  ERX for cipro 500 mg bid x 7 d to [facility] on [facility].

## 2019-12-14 ENCOUNTER — APPOINTMENT (OUTPATIENT)
Dept: GENERAL RADIOLOGY | Age: 39
DRG: 617 | End: 2019-12-14
Attending: NURSE PRACTITIONER
Payer: COMMERCIAL

## 2019-12-14 ENCOUNTER — HOSPITAL ENCOUNTER (INPATIENT)
Age: 39
LOS: 7 days | Discharge: HOME HEALTH | DRG: 617 | End: 2019-12-21
Attending: EMERGENCY MEDICINE | Admitting: INTERNAL MEDICINE
Payer: COMMERCIAL

## 2019-12-14 ENCOUNTER — APPOINTMENT (OUTPATIENT)
Dept: MRI IMAGING | Age: 39
DRG: 617 | End: 2019-12-14
Attending: INTERNAL MEDICINE
Payer: COMMERCIAL

## 2019-12-14 DIAGNOSIS — L08.9 WOUND INFECTION: Primary | ICD-10-CM

## 2019-12-14 DIAGNOSIS — T14.8XXA WOUND INFECTION: Primary | ICD-10-CM

## 2019-12-14 PROBLEM — M86.172 ACUTE OSTEOMYELITIS OF METATARSAL BONE OF LEFT FOOT (HCC): Status: ACTIVE | Noted: 2019-12-14

## 2019-12-14 PROBLEM — I10 HTN (HYPERTENSION), BENIGN: Status: ACTIVE | Noted: 2019-12-14

## 2019-12-14 PROBLEM — S14.109S QUADRIPLEGIA, POST-TRAUMATIC (HCC): Status: ACTIVE | Noted: 2019-12-14

## 2019-12-14 PROBLEM — M86.9 OSTEOMYELITIS (HCC): Status: ACTIVE | Noted: 2019-12-14

## 2019-12-14 PROBLEM — G82.50 QUADRIPLEGIA, POST-TRAUMATIC (HCC): Status: ACTIVE | Noted: 2019-12-14

## 2019-12-14 LAB
ANION GAP SERPL CALC-SCNC: 7 MMOL/L (ref 5–15)
BASOPHILS # BLD: 0 K/UL (ref 0–0.1)
BASOPHILS NFR BLD: 1 % (ref 0–1)
BUN SERPL-MCNC: 6 MG/DL (ref 6–20)
BUN/CREAT SERPL: 8 (ref 12–20)
CALCIUM SERPL-MCNC: 8.7 MG/DL (ref 8.5–10.1)
CHLORIDE SERPL-SCNC: 105 MMOL/L (ref 97–108)
CO2 SERPL-SCNC: 24 MMOL/L (ref 21–32)
COMMENT, HOLDF: NORMAL
CREAT SERPL-MCNC: 0.79 MG/DL (ref 0.7–1.3)
CRP SERPL-MCNC: <0.29 MG/DL (ref 0–0.6)
DIFFERENTIAL METHOD BLD: ABNORMAL
EOSINOPHIL # BLD: 0.2 K/UL (ref 0–0.4)
EOSINOPHIL NFR BLD: 2 % (ref 0–7)
ERYTHROCYTE [DISTWIDTH] IN BLOOD BY AUTOMATED COUNT: 14.6 % (ref 11.5–14.5)
ERYTHROCYTE [SEDIMENTATION RATE] IN BLOOD: 6 MM/HR (ref 0–15)
EST. AVERAGE GLUCOSE BLD GHB EST-MCNC: 252 MG/DL
GLUCOSE BLD STRIP.AUTO-MCNC: 188 MG/DL (ref 65–100)
GLUCOSE BLD STRIP.AUTO-MCNC: 293 MG/DL (ref 65–100)
GLUCOSE SERPL-MCNC: 101 MG/DL (ref 65–100)
HBA1C MFR BLD: 10.4 % (ref 4–5.6)
HCT VFR BLD AUTO: 41.9 % (ref 36.6–50.3)
HGB BLD-MCNC: 14 G/DL (ref 12.1–17)
IMM GRANULOCYTES # BLD AUTO: 0 K/UL (ref 0–0.04)
IMM GRANULOCYTES NFR BLD AUTO: 0 % (ref 0–0.5)
LYMPHOCYTES # BLD: 1.7 K/UL (ref 0.8–3.5)
LYMPHOCYTES NFR BLD: 20 % (ref 12–49)
MCH RBC QN AUTO: 29.8 PG (ref 26–34)
MCHC RBC AUTO-ENTMCNC: 33.4 G/DL (ref 30–36.5)
MCV RBC AUTO: 89.1 FL (ref 80–99)
MONOCYTES # BLD: 0.6 K/UL (ref 0–1)
MONOCYTES NFR BLD: 7 % (ref 5–13)
NEUTS SEG # BLD: 5.7 K/UL (ref 1.8–8)
NEUTS SEG NFR BLD: 70 % (ref 32–75)
NRBC # BLD: 0 K/UL (ref 0–0.01)
NRBC BLD-RTO: 0 PER 100 WBC
PLATELET # BLD AUTO: 265 K/UL (ref 150–400)
PMV BLD AUTO: 10 FL (ref 8.9–12.9)
POTASSIUM SERPL-SCNC: 3.8 MMOL/L (ref 3.5–5.1)
RBC # BLD AUTO: 4.7 M/UL (ref 4.1–5.7)
SAMPLES BEING HELD,HOLD: NORMAL
SERVICE CMNT-IMP: ABNORMAL
SERVICE CMNT-IMP: ABNORMAL
SODIUM SERPL-SCNC: 136 MMOL/L (ref 136–145)
WBC # BLD AUTO: 8.2 K/UL (ref 4.1–11.1)

## 2019-12-14 PROCEDURE — 80048 BASIC METABOLIC PNL TOTAL CA: CPT

## 2019-12-14 PROCEDURE — 94761 N-INVAS EAR/PLS OXIMETRY MLT: CPT

## 2019-12-14 PROCEDURE — 36415 COLL VENOUS BLD VENIPUNCTURE: CPT

## 2019-12-14 PROCEDURE — 82962 GLUCOSE BLOOD TEST: CPT

## 2019-12-14 PROCEDURE — 87040 BLOOD CULTURE FOR BACTERIA: CPT

## 2019-12-14 PROCEDURE — 73630 X-RAY EXAM OF FOOT: CPT

## 2019-12-14 PROCEDURE — 74011250636 HC RX REV CODE- 250/636: Performed by: EMERGENCY MEDICINE

## 2019-12-14 PROCEDURE — 83036 HEMOGLOBIN GLYCOSYLATED A1C: CPT

## 2019-12-14 PROCEDURE — 74011250636 HC RX REV CODE- 250/636: Performed by: INTERNAL MEDICINE

## 2019-12-14 PROCEDURE — 99281 EMR DPT VST MAYX REQ PHY/QHP: CPT

## 2019-12-14 PROCEDURE — 85025 COMPLETE CBC W/AUTO DIFF WBC: CPT

## 2019-12-14 PROCEDURE — 77030040831 HC BAG URINE DRNG MDII -A

## 2019-12-14 PROCEDURE — 65270000029 HC RM PRIVATE

## 2019-12-14 PROCEDURE — 74011000258 HC RX REV CODE- 258: Performed by: INTERNAL MEDICINE

## 2019-12-14 PROCEDURE — 86140 C-REACTIVE PROTEIN: CPT

## 2019-12-14 PROCEDURE — 74011636637 HC RX REV CODE- 636/637: Performed by: INTERNAL MEDICINE

## 2019-12-14 PROCEDURE — 74011000250 HC RX REV CODE- 250: Performed by: EMERGENCY MEDICINE

## 2019-12-14 PROCEDURE — 85652 RBC SED RATE AUTOMATED: CPT

## 2019-12-14 PROCEDURE — 74011250637 HC RX REV CODE- 250/637: Performed by: INTERNAL MEDICINE

## 2019-12-14 RX ORDER — INSULIN GLARGINE 100 [IU]/ML
36 INJECTION, SOLUTION SUBCUTANEOUS
Status: DISCONTINUED | OUTPATIENT
Start: 2019-12-14 | End: 2019-12-15

## 2019-12-14 RX ORDER — NALOXONE HYDROCHLORIDE 0.4 MG/ML
0.4 INJECTION, SOLUTION INTRAMUSCULAR; INTRAVENOUS; SUBCUTANEOUS AS NEEDED
Status: DISCONTINUED | OUTPATIENT
Start: 2019-12-14 | End: 2019-12-21 | Stop reason: HOSPADM

## 2019-12-14 RX ORDER — DEXTROSE MONOHYDRATE 100 MG/ML
0-250 INJECTION, SOLUTION INTRAVENOUS AS NEEDED
Status: DISCONTINUED | OUTPATIENT
Start: 2019-12-14 | End: 2019-12-21 | Stop reason: HOSPADM

## 2019-12-14 RX ORDER — HYDROMORPHONE HYDROCHLORIDE 1 MG/ML
1 INJECTION, SOLUTION INTRAMUSCULAR; INTRAVENOUS; SUBCUTANEOUS
Status: DISCONTINUED | OUTPATIENT
Start: 2019-12-14 | End: 2019-12-21 | Stop reason: HOSPADM

## 2019-12-14 RX ORDER — ONDANSETRON 2 MG/ML
4 INJECTION INTRAMUSCULAR; INTRAVENOUS
Status: DISCONTINUED | OUTPATIENT
Start: 2019-12-14 | End: 2019-12-21 | Stop reason: HOSPADM

## 2019-12-14 RX ORDER — SODIUM CHLORIDE 0.9 % (FLUSH) 0.9 %
5-40 SYRINGE (ML) INJECTION EVERY 8 HOURS
Status: DISCONTINUED | OUTPATIENT
Start: 2019-12-14 | End: 2019-12-21 | Stop reason: HOSPADM

## 2019-12-14 RX ORDER — TRAZODONE HYDROCHLORIDE 50 MG/1
50 TABLET ORAL
Status: DISCONTINUED | OUTPATIENT
Start: 2019-12-14 | End: 2019-12-21 | Stop reason: HOSPADM

## 2019-12-14 RX ORDER — BACLOFEN 10 MG/1
20 TABLET ORAL 3 TIMES DAILY
Status: DISCONTINUED | OUTPATIENT
Start: 2019-12-14 | End: 2019-12-15

## 2019-12-14 RX ORDER — DIAZEPAM 5 MG/1
5 TABLET ORAL
Status: DISCONTINUED | OUTPATIENT
Start: 2019-12-14 | End: 2019-12-21 | Stop reason: HOSPADM

## 2019-12-14 RX ORDER — MAGNESIUM SULFATE 100 %
4 CRYSTALS MISCELLANEOUS AS NEEDED
Status: DISCONTINUED | OUTPATIENT
Start: 2019-12-14 | End: 2019-12-21 | Stop reason: HOSPADM

## 2019-12-14 RX ORDER — ENOXAPARIN SODIUM 100 MG/ML
40 INJECTION SUBCUTANEOUS EVERY 24 HOURS
Status: DISCONTINUED | OUTPATIENT
Start: 2019-12-14 | End: 2019-12-17

## 2019-12-14 RX ORDER — ACETAMINOPHEN 325 MG/1
650 TABLET ORAL
Status: DISCONTINUED | OUTPATIENT
Start: 2019-12-14 | End: 2019-12-21 | Stop reason: HOSPADM

## 2019-12-14 RX ORDER — SODIUM CHLORIDE 0.9 % (FLUSH) 0.9 %
5-40 SYRINGE (ML) INJECTION AS NEEDED
Status: DISCONTINUED | OUTPATIENT
Start: 2019-12-14 | End: 2019-12-21 | Stop reason: HOSPADM

## 2019-12-14 RX ORDER — INSULIN LISPRO 100 [IU]/ML
INJECTION, SOLUTION INTRAVENOUS; SUBCUTANEOUS
Status: DISCONTINUED | OUTPATIENT
Start: 2019-12-14 | End: 2019-12-21 | Stop reason: HOSPADM

## 2019-12-14 RX ORDER — METRONIDAZOLE 500 MG/100ML
500 INJECTION, SOLUTION INTRAVENOUS EVERY 12 HOURS
Status: DISCONTINUED | OUTPATIENT
Start: 2019-12-14 | End: 2019-12-21 | Stop reason: HOSPADM

## 2019-12-14 RX ORDER — METRONIDAZOLE 500 MG/100ML
500 INJECTION, SOLUTION INTRAVENOUS EVERY 8 HOURS
Status: DISCONTINUED | OUTPATIENT
Start: 2019-12-14 | End: 2019-12-14

## 2019-12-14 RX ADMIN — BACLOFEN 20 MG: 10 TABLET ORAL at 22:02

## 2019-12-14 RX ADMIN — Medication 10 ML: at 14:00

## 2019-12-14 RX ADMIN — VANCOMYCIN HYDROCHLORIDE 1250 MG: 1.25 INJECTION, POWDER, LYOPHILIZED, FOR SOLUTION INTRAVENOUS at 22:02

## 2019-12-14 RX ADMIN — ENOXAPARIN SODIUM 40 MG: 40 INJECTION SUBCUTANEOUS at 20:54

## 2019-12-14 RX ADMIN — HYDROMORPHONE HYDROCHLORIDE 1 MG: 1 INJECTION, SOLUTION INTRAMUSCULAR; INTRAVENOUS; SUBCUTANEOUS at 21:36

## 2019-12-14 RX ADMIN — INSULIN LISPRO 2 UNITS: 100 INJECTION, SOLUTION INTRAVENOUS; SUBCUTANEOUS at 16:33

## 2019-12-14 RX ADMIN — INSULIN GLARGINE 36 UNITS: 100 INJECTION, SOLUTION SUBCUTANEOUS at 21:34

## 2019-12-14 RX ADMIN — METRONIDAZOLE 500 MG: 500 INJECTION, SOLUTION INTRAVENOUS at 16:56

## 2019-12-14 RX ADMIN — VANCOMYCIN HYDROCHLORIDE 2000 MG: 10 INJECTION, POWDER, LYOPHILIZED, FOR SOLUTION INTRAVENOUS at 13:21

## 2019-12-14 RX ADMIN — WATER 2 G: 1 INJECTION INTRAMUSCULAR; INTRAVENOUS; SUBCUTANEOUS at 13:16

## 2019-12-14 RX ADMIN — CEFEPIME 2 G: 20 INJECTION, POWDER, FOR SOLUTION INTRAVENOUS at 20:53

## 2019-12-14 RX ADMIN — INSULIN LISPRO 3 UNITS: 100 INJECTION, SOLUTION INTRAVENOUS; SUBCUTANEOUS at 22:10

## 2019-12-14 RX ADMIN — HYDROMORPHONE HYDROCHLORIDE 1 MG: 1 INJECTION, SOLUTION INTRAMUSCULAR; INTRAVENOUS; SUBCUTANEOUS at 17:24

## 2019-12-14 RX ADMIN — Medication 10 ML: at 21:35

## 2019-12-14 RX ADMIN — TRAZODONE HYDROCHLORIDE 50 MG: 50 TABLET ORAL at 21:34

## 2019-12-14 NOTE — PROGRESS NOTES
Automatic dose adjustment per [facility] P&T protocol for Metronidazole (Flagyl) for this 35 y.o. for indication of: SSTI/Osteomyelitis     Current regimen: Metronidazole 500mg IV Q8hrs  Recommended regimen: Metronidazole 500mg IV Q12hrs --> order changed    Intervention:  1.  Pharmacy will automatically change the dose of metronidazole to 500 mg every 12 hours for all indications except for the following:   a. C. difficile infection   b. CNS infections   c. Non-anaerobic infections (giardiasis, trichomonas, H pylori, etc)       Thank you,  OSWALD Moraes Contact information: [phone]

## 2019-12-14 NOTE — ED NOTES
TRANSFER - OUT REPORT:    Verbal report given to Katina(name) on Jacquelin Gabriel  being transferred to 4th floor(unit) for routine progression of care       Report consisted of patients Situation, Background, Assessment and   Recommendations(SBAR). Information from the following report(s) SBAR, ED Summary, [facility] and Recent Results was reviewed with the receiving nurse. Lines:   Peripheral IV 12/14/19 Left Antecubital (Active)   Site Assessment Clean, dry, & intact 12/14/2019 11:08 AM   Phlebitis Assessment 0 12/14/2019 11:08 AM   Infiltration Assessment 0 12/14/2019 11:08 AM   Dressing Status Clean, dry, & intact 12/14/2019 11:08 AM   Dressing Type Transparent 12/14/2019 11:08 AM   Hub Color/Line Status Pink;Flushed 12/14/2019 11:08 AM   Action Taken Blood drawn 12/14/2019 11:08 AM       Peripheral IV 12/14/19 Right Forearm (Active)   Site Assessment Clean, dry, & intact 12/14/2019  1:10 PM   Phlebitis Assessment 0 12/14/2019  1:10 PM   Infiltration Assessment 0 12/14/2019  1:10 PM   Dressing Status Clean, dry, & intact 12/14/2019  1:10 PM   Dressing Type Tape;Transparent 12/14/2019  1:10 PM   Hub Color/Line Status Pink;Flushed;Patent 12/14/2019  1:10 PM   Action Taken Blood drawn 12/14/2019  1:10 PM   Alcohol Cap Used Yes 12/14/2019  1:10 PM        Opportunity for questions and clarification was provided.       Patient transported with:   [email or web address]
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
## 2019-12-14 NOTE — ED NOTES
10:47 AM  I have just evaluated the patient. I have reviewed His vital signs and determined there is currently no worsening in their condition or physical exam. I have talked with the patient and the family and advised them that I am the provider in triage and have ordered lab work, x rays and other diagnostic tests. I have advised them that we will try and get them to the back as soon as possible. I have also advised them that should they have a worsening condition or any problems before they are sent back to the main ED, to contact me or the triage nurse. Initial Complaint: Left foot infection    Started: today    Endorses: Excessive purulent drainage from the left foot with wound care this morning. Has home health change dressings TID. Having more spasms. Wants to be screened for UTI. Having more spasms. Quadraplegia  Denies: F/C    Made better: nothing  Made worse: nothing    No further complaints. Orders placed.      Primary care provider: MD Natalie Khoury, LORA
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
## 2019-12-14 NOTE — PROGRESS NOTES
Problem: Pain  Goal: *Control of Pain  Outcome: Progressing Towards Goal     Problem: Patient Education: Go to Patient Education Activity  Goal: Patient/Family Education  Outcome: Progressing Towards Goal     Problem: Infection - Risk of, Surgical Site Infection  Goal: *Absence of surgical site infection signs and symptoms  Outcome: Progressing Towards Goal     Problem: Patient Education: Go to Patient Education Activity  Goal: Patient/Family Education  Outcome: Progressing Towards Goal

## 2019-12-15 ENCOUNTER — APPOINTMENT (OUTPATIENT)
Dept: MRI IMAGING | Age: 39
DRG: 617 | End: 2019-12-15
Attending: INTERNAL MEDICINE
Payer: COMMERCIAL

## 2019-12-15 LAB
ALBUMIN SERPL-MCNC: 3.3 G/DL (ref 3.5–5)
ALBUMIN/GLOB SERPL: 1 {RATIO} (ref 1.1–2.2)
ALP SERPL-CCNC: 83 U/L (ref 45–117)
ALT SERPL-CCNC: 13 U/L (ref 12–78)
ANION GAP SERPL CALC-SCNC: 6 MMOL/L (ref 5–15)
AST SERPL-CCNC: 12 U/L (ref 15–37)
BASOPHILS # BLD: 0.1 K/UL (ref 0–0.1)
BASOPHILS NFR BLD: 1 % (ref 0–1)
BILIRUB SERPL-MCNC: 0.3 MG/DL (ref 0.2–1)
BUN SERPL-MCNC: 7 MG/DL (ref 6–20)
BUN/CREAT SERPL: 9 (ref 12–20)
CALCIUM SERPL-MCNC: 8.7 MG/DL (ref 8.5–10.1)
CHLORIDE SERPL-SCNC: 108 MMOL/L (ref 97–108)
CO2 SERPL-SCNC: 27 MMOL/L (ref 21–32)
CREAT SERPL-MCNC: 0.78 MG/DL (ref 0.7–1.3)
DATE LAST DOSE: ABNORMAL
DIFFERENTIAL METHOD BLD: ABNORMAL
EOSINOPHIL # BLD: 0.1 K/UL (ref 0–0.4)
EOSINOPHIL NFR BLD: 2 % (ref 0–7)
ERYTHROCYTE [DISTWIDTH] IN BLOOD BY AUTOMATED COUNT: 14.7 % (ref 11.5–14.5)
GLOBULIN SER CALC-MCNC: 3.4 G/DL (ref 2–4)
GLUCOSE BLD STRIP.AUTO-MCNC: 124 MG/DL (ref 65–100)
GLUCOSE BLD STRIP.AUTO-MCNC: 236 MG/DL (ref 65–100)
GLUCOSE BLD STRIP.AUTO-MCNC: 245 MG/DL (ref 65–100)
GLUCOSE BLD STRIP.AUTO-MCNC: 96 MG/DL (ref 65–100)
GLUCOSE SERPL-MCNC: 59 MG/DL (ref 65–100)
HCT VFR BLD AUTO: 40.3 % (ref 36.6–50.3)
HGB BLD-MCNC: 13.2 G/DL (ref 12.1–17)
IMM GRANULOCYTES # BLD AUTO: 0 K/UL (ref 0–0.04)
IMM GRANULOCYTES NFR BLD AUTO: 0 % (ref 0–0.5)
LYMPHOCYTES # BLD: 2.3 K/UL (ref 0.8–3.5)
LYMPHOCYTES NFR BLD: 31 % (ref 12–49)
MCH RBC QN AUTO: 29.7 PG (ref 26–34)
MCHC RBC AUTO-ENTMCNC: 32.8 G/DL (ref 30–36.5)
MCV RBC AUTO: 90.8 FL (ref 80–99)
MONOCYTES # BLD: 0.7 K/UL (ref 0–1)
MONOCYTES NFR BLD: 10 % (ref 5–13)
NEUTS SEG # BLD: 4.1 K/UL (ref 1.8–8)
NEUTS SEG NFR BLD: 56 % (ref 32–75)
NRBC # BLD: 0 K/UL (ref 0–0.01)
NRBC BLD-RTO: 0 PER 100 WBC
PLATELET # BLD AUTO: 234 K/UL (ref 150–400)
PMV BLD AUTO: 9.9 FL (ref 8.9–12.9)
POTASSIUM SERPL-SCNC: 3.9 MMOL/L (ref 3.5–5.1)
PROT SERPL-MCNC: 6.7 G/DL (ref 6.4–8.2)
RBC # BLD AUTO: 4.44 M/UL (ref 4.1–5.7)
REPORTED DOSE,DOSE: ABNORMAL UNITS
REPORTED DOSE/TIME,TMG: ABNORMAL
SERVICE CMNT-IMP: ABNORMAL
SERVICE CMNT-IMP: NORMAL
SODIUM SERPL-SCNC: 141 MMOL/L (ref 136–145)
VANCOMYCIN TROUGH SERPL-MCNC: 13.8 UG/ML (ref 5–10)
WBC # BLD AUTO: 7.3 K/UL (ref 4.1–11.1)

## 2019-12-15 PROCEDURE — 0T2BX0Z CHANGE DRAINAGE DEVICE IN BLADDER, EXTERNAL APPROACH: ICD-10-PCS | Performed by: INTERNAL MEDICINE

## 2019-12-15 PROCEDURE — 74011636637 HC RX REV CODE- 636/637: Performed by: INTERNAL MEDICINE

## 2019-12-15 PROCEDURE — 73718 MRI LOWER EXTREMITY W/O DYE: CPT

## 2019-12-15 PROCEDURE — 74011250636 HC RX REV CODE- 250/636: Performed by: INTERNAL MEDICINE

## 2019-12-15 PROCEDURE — 77030018836 HC SOL IRR NACL ICUM -A

## 2019-12-15 PROCEDURE — 80202 ASSAY OF VANCOMYCIN: CPT

## 2019-12-15 PROCEDURE — 82962 GLUCOSE BLOOD TEST: CPT

## 2019-12-15 PROCEDURE — 51798 US URINE CAPACITY MEASURE: CPT

## 2019-12-15 PROCEDURE — 65270000029 HC RM PRIVATE

## 2019-12-15 PROCEDURE — 85025 COMPLETE CBC W/AUTO DIFF WBC: CPT

## 2019-12-15 PROCEDURE — 77030041247 HC PROTECTOR HEEL HEELMEDIX MDII -B

## 2019-12-15 PROCEDURE — 74011000258 HC RX REV CODE- 258: Performed by: INTERNAL MEDICINE

## 2019-12-15 PROCEDURE — 36415 COLL VENOUS BLD VENIPUNCTURE: CPT

## 2019-12-15 PROCEDURE — 94760 N-INVAS EAR/PLS OXIMETRY 1: CPT

## 2019-12-15 PROCEDURE — 74011250637 HC RX REV CODE- 250/637: Performed by: INTERNAL MEDICINE

## 2019-12-15 PROCEDURE — 80053 COMPREHEN METABOLIC PANEL: CPT

## 2019-12-15 RX ORDER — INSULIN GLARGINE 100 [IU]/ML
30 INJECTION, SOLUTION SUBCUTANEOUS
Status: DISCONTINUED | OUTPATIENT
Start: 2019-12-15 | End: 2019-12-16

## 2019-12-15 RX ORDER — BACLOFEN 10 MG/1
40 TABLET ORAL 3 TIMES DAILY
Status: DISCONTINUED | OUTPATIENT
Start: 2019-12-15 | End: 2019-12-15

## 2019-12-15 RX ORDER — DANTROLENE SODIUM 25 MG/1
50 CAPSULE ORAL 4 TIMES DAILY
Status: DISCONTINUED | OUTPATIENT
Start: 2019-12-15 | End: 2019-12-15

## 2019-12-15 RX ORDER — DANTROLENE SODIUM 25 MG/1
100 CAPSULE ORAL 4 TIMES DAILY
Status: DISCONTINUED | OUTPATIENT
Start: 2019-12-15 | End: 2019-12-21 | Stop reason: HOSPADM

## 2019-12-15 RX ORDER — FLUOXETINE HYDROCHLORIDE 20 MG/1
20 CAPSULE ORAL DAILY
Status: DISCONTINUED | OUTPATIENT
Start: 2019-12-15 | End: 2019-12-16

## 2019-12-15 RX ORDER — TROSPIUM CHLORIDE 20 MG/1
20 TABLET, FILM COATED ORAL 2 TIMES DAILY
COMMUNITY
End: 2021-01-12

## 2019-12-15 RX ORDER — BACLOFEN 10 MG/1
40 TABLET ORAL 3 TIMES DAILY
Status: DISCONTINUED | OUTPATIENT
Start: 2019-12-15 | End: 2019-12-21 | Stop reason: HOSPADM

## 2019-12-15 RX ORDER — DANTROLENE SODIUM 25 MG/1
50 CAPSULE ORAL ONCE
Status: DISPENSED | OUTPATIENT
Start: 2019-12-15 | End: 2019-12-15

## 2019-12-15 RX ORDER — AMITRIPTYLINE HYDROCHLORIDE 50 MG/1
25 TABLET, FILM COATED ORAL
Status: DISCONTINUED | OUTPATIENT
Start: 2019-12-15 | End: 2019-12-21 | Stop reason: HOSPADM

## 2019-12-15 RX ORDER — OXYBUTYNIN CHLORIDE 5 MG/1
5 TABLET, EXTENDED RELEASE ORAL DAILY
Status: DISCONTINUED | OUTPATIENT
Start: 2019-12-15 | End: 2019-12-21 | Stop reason: HOSPADM

## 2019-12-15 RX ORDER — BACLOFEN 10 MG/1
20 TABLET ORAL ONCE
Status: DISPENSED | OUTPATIENT
Start: 2019-12-15 | End: 2019-12-15

## 2019-12-15 RX ADMIN — AMITRIPTYLINE HYDROCHLORIDE 25 MG: 50 TABLET, FILM COATED ORAL at 22:28

## 2019-12-15 RX ADMIN — ENOXAPARIN SODIUM 40 MG: 40 INJECTION SUBCUTANEOUS at 22:30

## 2019-12-15 RX ADMIN — INSULIN LISPRO 3 UNITS: 100 INJECTION, SOLUTION INTRAVENOUS; SUBCUTANEOUS at 17:08

## 2019-12-15 RX ADMIN — INSULIN LISPRO 2 UNITS: 100 INJECTION, SOLUTION INTRAVENOUS; SUBCUTANEOUS at 22:29

## 2019-12-15 RX ADMIN — DANTROLENE SODIUM 100 MG: 25 CAPSULE ORAL at 17:08

## 2019-12-15 RX ADMIN — CEFEPIME 2 G: 20 INJECTION, POWDER, FOR SOLUTION INTRAVENOUS at 05:37

## 2019-12-15 RX ADMIN — BACLOFEN 40 MG: 10 TABLET ORAL at 17:09

## 2019-12-15 RX ADMIN — CEFEPIME 2 G: 20 INJECTION, POWDER, FOR SOLUTION INTRAVENOUS at 14:33

## 2019-12-15 RX ADMIN — BACLOFEN 40 MG: 10 TABLET ORAL at 12:07

## 2019-12-15 RX ADMIN — ACETAMINOPHEN 650 MG: 325 TABLET ORAL at 12:08

## 2019-12-15 RX ADMIN — DANTROLENE SODIUM 100 MG: 25 CAPSULE ORAL at 12:06

## 2019-12-15 RX ADMIN — VANCOMYCIN HYDROCHLORIDE 1250 MG: 1.25 INJECTION, POWDER, LYOPHILIZED, FOR SOLUTION INTRAVENOUS at 06:35

## 2019-12-15 RX ADMIN — DANTROLENE SODIUM 100 MG: 25 CAPSULE ORAL at 22:29

## 2019-12-15 RX ADMIN — OXYBUTYNIN CHLORIDE 5 MG: 5 TABLET, EXTENDED RELEASE ORAL at 17:12

## 2019-12-15 RX ADMIN — CEFEPIME 2 G: 20 INJECTION, POWDER, FOR SOLUTION INTRAVENOUS at 22:30

## 2019-12-15 RX ADMIN — INSULIN GLARGINE 30 UNITS: 100 INJECTION, SOLUTION SUBCUTANEOUS at 22:29

## 2019-12-15 RX ADMIN — Medication 10 ML: at 05:38

## 2019-12-15 RX ADMIN — BACLOFEN 20 MG: 10 TABLET ORAL at 05:26

## 2019-12-15 RX ADMIN — METRONIDAZOLE 500 MG: 500 INJECTION, SOLUTION INTRAVENOUS at 17:09

## 2019-12-15 RX ADMIN — DIAZEPAM 5 MG: 5 TABLET ORAL at 09:50

## 2019-12-15 RX ADMIN — TRAZODONE HYDROCHLORIDE 50 MG: 50 TABLET ORAL at 22:28

## 2019-12-15 RX ADMIN — VANCOMYCIN HYDROCHLORIDE 1250 MG: 1.25 INJECTION, POWDER, LYOPHILIZED, FOR SOLUTION INTRAVENOUS at 23:09

## 2019-12-15 RX ADMIN — DANTROLENE SODIUM 50 MG: 25 CAPSULE ORAL at 10:16

## 2019-12-15 RX ADMIN — VANCOMYCIN HYDROCHLORIDE 1250 MG: 1.25 INJECTION, POWDER, LYOPHILIZED, FOR SOLUTION INTRAVENOUS at 14:32

## 2019-12-15 RX ADMIN — FLUOXETINE 20 MG: 20 CAPSULE ORAL at 17:09

## 2019-12-15 RX ADMIN — Medication 10 ML: at 22:00

## 2019-12-15 RX ADMIN — METRONIDAZOLE 500 MG: 500 INJECTION, SOLUTION INTRAVENOUS at 04:40

## 2019-12-15 RX ADMIN — Medication 10 ML: at 12:12

## 2019-12-15 NOTE — PROGRESS NOTES
Attempted to call [facility] pharmacy to reconcile home medications. Need clarification on Prozac, Dantrolene and Baclofen. Will call back at 1000. [facility] at this [facility]. [phone].    4625 Requested Dantrium from pharmacy 50 mg PO  To give at 0900 for spasms. Pharmacist wants to wait until clarification of dose amount is verified. Requested at least the 50 mg to be given at this time. Will wait until clarified. [address] [facility] with Vinie Friday at [address] clarification as follows:    Prozac 20 mg PO 1 cap Daily 6:00 am    Baclofen 10 mg 4 caps PO TID ( each dose = 40 mg TID) 6, 12, 6   Dantrolene 100 mg 1 cap PO QID 6, 12, 6 and 2200  Amitriptyline 25 mg PO at 2200     1600 Spoke with Dr. Kassy Michelle clarified and resumed :  Prozac and Amtrptyline orders. May give Trouspan or hospital equivalent. Will notify pharmacy. Spoke with Garrett Hernández pharmacist. AVS updated.

## 2019-12-15 NOTE — PROGRESS NOTES
Pt seen and evaluated after nurse called stating that patient's suprapubic catheter came out. On arrival at the bedside, patient notes that he thinks that the catheter came out recently but he is uncertain of how it came out. Patient notes that he catheter was orginally placed at [address] [place] [address] over a year ago and was last replacement by his urologist in his office ~ 30 days ago. Pt notes that catheter is a 22 [address] but it required some manipulation by his urologist when it was inserted and it was a larger replacement catheter than usual.    PE:  GEN- patient in no acute respiratory distress  PSYCH-A&O  NEURO-GCS 15. Has quadriplegia with spastic movements of both legs. HEENT-NCAT/pupils 2 mm reactive bilateral. Oropharynx clear. NECK-supple, trachea midline  LUNGS-CTA B  HEART-RRR  ABD-soft, NT,ND,  +BS. No R/G/R  - suprapubic catheter wound site with closed / stenotic site. Suprapubic catheter was lying on bed clotted with secretions. VASCULAR-2+ radial/1+DP pulses bilateral. No C/C/E  SKIN-warm/dry  MS-no calf tenderness. Flexion contracture of BLE    A/P:  Dislodged suprapubic catheter. Needs replacement. Unfortunately, suprapubic catheter insertion site is stenotic. I will consult urologist for assistance with replacement.

## 2019-12-15 NOTE — PROGRESS NOTES
[facility] Dosing Services: Antimicrobial Stewardship Daily Doc    Consult for antibiotic dosing of Vancomycin by Dr. Rachael Posadas  Indication:DFI/Osteomylitis  Day of Therapy 2    Vancomycin therapy:  Current maintenance dose: 1250 (mg) every 8 hours   Goal trough 15-20 mcg/ml  Last trough level 13.8 mcg/ml drawn 0.5 hrs late. Corrected trough ~15 mcg/ml. Plan :continue current dose    Pharmacy to follow daily.   Pharmacist Leslie Valdez

## 2019-12-15 NOTE — PROGRESS NOTES
12/15/2019  12:25 PM    Reason for Admission:   Wound oozing pus                  RRAT Score:  12 moderate                Do you (patient/family) have any concerns for transition/discharge? The patient reported he does not have any concerns. Plan for utilizing home health:   TBD    Current Advanced Directive/Advance Care Plan:  No plan on file            Transition of Care Plan:        Pt resides in 1st floor apt with his girlfriend in [place], [place]. Pt has children and they visit him during the weekends. Pt's PCP is Dr. Dalton Fontenot and he visited his PCP 2.5 months ago. Pt has hx of [employer] and [facility]. Pt has 2 caregivers visiting his home 7 days a week - the morning caregiver is there 2-4 hours and the nighttime caregiver is there for 2 hours. Pt's girlfriend also assists with his ADL's. Pt has DME - power wheelchair, , standing frames, rolling shower chair. Pt was driving before admission and advised he intends to transport himself home. Pt's RX filled at [facility] on [address]. CM will follow for discharge needs. WILLA Henderson    Care Management Interventions  PCP Verified by CM: Yes  Mode of Transport at Discharge: Self  Transition of Care Consult (CM Consult): Discharge Planning  Physical Therapy Consult: No  Occupational Therapy Consult: No  Speech Therapy Consult: No  Current Support Network:  Other(lives with girlfriend)
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
## 2019-12-15 NOTE — PROGRESS NOTES
I called and consulted urologist on call Dr. Kalpana Garcia regarding patient's dislodged suprapubic catheter and the concern about the stenotic suprapubic tract. I informed him that as the tract was stenotic it would likely be difficult to re-insert and as such I am consulting him tonight. Dr. Emily Guillermo requested trying to insert another catheter. I informed him that I will try to insert a 16 french catheter, possibly 14 french but unable to insert then I will have to call him back to insert. Even still, I would like to urologist to re-evaluate patient this a.m. A/P:  Dislodged suprapubic catheter    Procedure:  After sterilizing and prepping the suprapubic catheter site, I initially attempted insertion of a # 16 french bonner catheter to preserve the tract. As there was resistance, I did not want to cause injury to this site. I was able to successfully insert a # 14 french bonner catheter for now. There was good urine flow/ drainage after insertion of bonner catheter. I inflated balloon with 5 cc saline but not more as uncertain as to size of bladder and once again to prevent any injury. Patient tolerated procedure but of note before procedure patient had severe spasms of both legs. Plan: again would request urologist to follow up with patient this a.m. Post procedure bladder scan only showed residual of 21 cc.

## 2019-12-16 LAB
CREAT SERPL-MCNC: 0.73 MG/DL (ref 0.7–1.3)
GLUCOSE BLD STRIP.AUTO-MCNC: 111 MG/DL (ref 65–100)
GLUCOSE BLD STRIP.AUTO-MCNC: 84 MG/DL (ref 65–100)
GLUCOSE BLD STRIP.AUTO-MCNC: 89 MG/DL (ref 65–100)
SERVICE CMNT-IMP: ABNORMAL
SERVICE CMNT-IMP: NORMAL
SERVICE CMNT-IMP: NORMAL

## 2019-12-16 PROCEDURE — 82565 ASSAY OF CREATININE: CPT

## 2019-12-16 PROCEDURE — 74011250637 HC RX REV CODE- 250/637: Performed by: INTERNAL MEDICINE

## 2019-12-16 PROCEDURE — 74011250636 HC RX REV CODE- 250/636: Performed by: INTERNAL MEDICINE

## 2019-12-16 PROCEDURE — 74011000258 HC RX REV CODE- 258: Performed by: INTERNAL MEDICINE

## 2019-12-16 PROCEDURE — 82962 GLUCOSE BLOOD TEST: CPT

## 2019-12-16 PROCEDURE — 65270000029 HC RM PRIVATE

## 2019-12-16 PROCEDURE — 94760 N-INVAS EAR/PLS OXIMETRY 1: CPT

## 2019-12-16 PROCEDURE — 36415 COLL VENOUS BLD VENIPUNCTURE: CPT

## 2019-12-16 RX ORDER — FLUOXETINE HYDROCHLORIDE 20 MG/1
20 CAPSULE ORAL DAILY
Status: DISCONTINUED | OUTPATIENT
Start: 2019-12-16 | End: 2019-12-21 | Stop reason: HOSPADM

## 2019-12-16 RX ORDER — INSULIN GLARGINE 100 [IU]/ML
25 INJECTION, SOLUTION SUBCUTANEOUS
Status: DISCONTINUED | OUTPATIENT
Start: 2019-12-16 | End: 2019-12-21 | Stop reason: HOSPADM

## 2019-12-16 RX ADMIN — BACLOFEN 40 MG: 10 TABLET ORAL at 05:02

## 2019-12-16 RX ADMIN — OXYBUTYNIN CHLORIDE 5 MG: 5 TABLET, EXTENDED RELEASE ORAL at 10:04

## 2019-12-16 RX ADMIN — DIAZEPAM 5 MG: 5 TABLET ORAL at 11:51

## 2019-12-16 RX ADMIN — METRONIDAZOLE 500 MG: 500 INJECTION, SOLUTION INTRAVENOUS at 17:06

## 2019-12-16 RX ADMIN — TRAZODONE HYDROCHLORIDE 50 MG: 50 TABLET ORAL at 21:44

## 2019-12-16 RX ADMIN — CEFEPIME 2 G: 20 INJECTION, POWDER, FOR SOLUTION INTRAVENOUS at 05:04

## 2019-12-16 RX ADMIN — Medication 10 ML: at 05:05

## 2019-12-16 RX ADMIN — CEFEPIME 2 G: 20 INJECTION, POWDER, FOR SOLUTION INTRAVENOUS at 14:13

## 2019-12-16 RX ADMIN — DANTROLENE SODIUM 100 MG: 25 CAPSULE ORAL at 21:44

## 2019-12-16 RX ADMIN — FLUOXETINE HYDROCHLORIDE 20 MG: 20 CAPSULE ORAL at 06:20

## 2019-12-16 RX ADMIN — HYDROMORPHONE HYDROCHLORIDE 1 MG: 1 INJECTION, SOLUTION INTRAMUSCULAR; INTRAVENOUS; SUBCUTANEOUS at 21:46

## 2019-12-16 RX ADMIN — BACLOFEN 40 MG: 10 TABLET ORAL at 11:42

## 2019-12-16 RX ADMIN — VANCOMYCIN HYDROCHLORIDE 1250 MG: 1.25 INJECTION, POWDER, LYOPHILIZED, FOR SOLUTION INTRAVENOUS at 15:03

## 2019-12-16 RX ADMIN — Medication 10 ML: at 21:50

## 2019-12-16 RX ADMIN — ENOXAPARIN SODIUM 40 MG: 40 INJECTION SUBCUTANEOUS at 21:47

## 2019-12-16 RX ADMIN — DANTROLENE SODIUM 100 MG: 25 CAPSULE ORAL at 05:03

## 2019-12-16 RX ADMIN — VANCOMYCIN HYDROCHLORIDE 1250 MG: 1.25 INJECTION, POWDER, LYOPHILIZED, FOR SOLUTION INTRAVENOUS at 21:47

## 2019-12-16 RX ADMIN — DANTROLENE SODIUM 100 MG: 25 CAPSULE ORAL at 18:43

## 2019-12-16 RX ADMIN — BACLOFEN 40 MG: 10 TABLET ORAL at 18:43

## 2019-12-16 RX ADMIN — DANTROLENE SODIUM 100 MG: 25 CAPSULE ORAL at 11:42

## 2019-12-16 RX ADMIN — CEFEPIME 2 G: 20 INJECTION, POWDER, FOR SOLUTION INTRAVENOUS at 21:47

## 2019-12-16 RX ADMIN — ONDANSETRON 4 MG: 2 INJECTION INTRAMUSCULAR; INTRAVENOUS at 18:39

## 2019-12-16 RX ADMIN — HYDROMORPHONE HYDROCHLORIDE 1 MG: 1 INJECTION, SOLUTION INTRAMUSCULAR; INTRAVENOUS; SUBCUTANEOUS at 11:51

## 2019-12-16 RX ADMIN — METRONIDAZOLE 500 MG: 500 INJECTION, SOLUTION INTRAVENOUS at 04:00

## 2019-12-16 RX ADMIN — VANCOMYCIN HYDROCHLORIDE 1250 MG: 1.25 INJECTION, POWDER, LYOPHILIZED, FOR SOLUTION INTRAVENOUS at 06:20

## 2019-12-16 RX ADMIN — AMITRIPTYLINE HYDROCHLORIDE 25 MG: 50 TABLET, FILM COATED ORAL at 21:44
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
## 2019-12-16 NOTE — PROGRESS NOTES
12/16/2019 4:45 PM Received call from [address] [facility] [facility] at [facility] who reported pt was open to their company prior to admission for wound care. CM will follow. 12/16/2019  10:27 AM Case management consult for discharge planning received. EMR reviewed, initial assessment was completed on 12/15. Podiatry consult pending final MRI report. CM will follow for final discharge recommendations.  JUANITA Reyna

## 2019-12-16 NOTE — PROGRESS NOTES
Problem: Pain  Goal: *Control of Pain  Outcome: Progressing Towards Goal     Problem: Patient Education: Go to Patient Education Activity  Goal: Patient/Family Education  Outcome: Progressing Towards Goal     Problem: Infection - Risk of, Surgical Site Infection  Goal: *Absence of surgical site infection signs and symptoms  Outcome: Progressing Towards Goal     Problem: Patient Education: Go to Patient Education Activity  Goal: Patient/Family Education  Outcome: Progressing Towards Goal     Problem: Diabetes Self-Management  Goal: *Disease process and treatment process  Description  Define diabetes and identify own type of diabetes; list 3 options for treating diabetes. Outcome: Progressing Towards Goal  Goal: *Incorporating nutritional management into lifestyle  Description  Describe effect of type, amount and timing of food on blood glucose; list 3 methods for planning meals. Outcome: Progressing Towards Goal  Goal: *Incorporating physical activity into lifestyle  Description  State effect of exercise on blood glucose levels. Outcome: Progressing Towards Goal  Goal: *Developing strategies to promote health/change behavior  Description  Define the ABC's of diabetes; identify appropriate screenings, schedule and personal plan for screenings. Outcome: Progressing Towards Goal  Goal: *Using medications safely  Description  State effect of diabetes medications on diabetes; name diabetes medication taking, action and side effects. Outcome: Progressing Towards Goal  Goal: *Monitoring blood glucose, interpreting and using results  Description  Identify recommended blood glucose targets  and personal targets. Outcome: Progressing Towards Goal  Goal: *Prevention, detection, treatment of acute complications  Description  List symptoms of hyper- and hypoglycemia; describe how to treat low blood sugar and actions for lowering  high blood glucose level.   Outcome: Progressing Towards Goal  Goal: *Prevention, detection and treatment of chronic complications  Description  Define the natural course of diabetes and describe the relationship of blood glucose levels to long term complications of diabetes. Outcome: Progressing Towards Goal  Goal: *Developing strategies to address psychosocial issues  Description  Describe feelings about living with diabetes; identify support needed and support network  Outcome: Progressing Towards Goal  Goal: *Insulin pump training  Outcome: Progressing Towards Goal  Goal: *Sick day guidelines  Outcome: Progressing Towards Goal  Goal: *Patient Specific Goal (EDIT GOAL, INSERT TEXT)  Outcome: Progressing Towards Goal     Problem: Patient Education: Go to Patient Education Activity  Goal: Patient/Family Education  Outcome: Progressing Towards Goal     Problem: Falls - Risk of  Goal: *Absence of Falls  Description  Document Deisy Gil Fall Risk and appropriate interventions in the flowsheet. Outcome: Progressing Towards Goal  Note: Fall Risk Interventions:  Mobility Interventions: Communicate number of staff needed for ambulation/transfer         Medication Interventions: Bed/chair exit alarm    Elimination Interventions: Call light in reach, Stay With Me (per policy)    History of Falls Interventions: Room close to nurse's station         Problem: Patient Education: Go to Patient Education Activity  Goal: Patient/Family Education  Outcome: Progressing Towards Goal     Problem: Pressure Injury - Risk of  Goal: *Prevention of pressure injury  Description  Document Dhiraj Scale and appropriate interventions in the flowsheet. Outcome: Progressing Towards Goal  Note: Pressure Injury Interventions:  Sensory Interventions: Turn and reposition approx.  every two hours (pillows and wedges if needed)         Activity Interventions: Assess need for specialty bed    Mobility Interventions: Assess need for specialty bed         Friction and Shear Interventions: Lift sheet, HOB 30 degrees or less                Problem: Patient Education: Go to Patient Education Activity  Goal: Patient/Family Education  Outcome: Progressing Towards Goal
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
## 2019-12-17 ENCOUNTER — ANESTHESIA EVENT (OUTPATIENT)
Dept: SURGERY | Age: 39
DRG: 617 | End: 2019-12-17
Payer: COMMERCIAL

## 2019-12-17 ENCOUNTER — ANESTHESIA (OUTPATIENT)
Dept: SURGERY | Age: 39
DRG: 617 | End: 2019-12-17
Payer: COMMERCIAL

## 2019-12-17 ENCOUNTER — APPOINTMENT (OUTPATIENT)
Dept: GENERAL RADIOLOGY | Age: 39
DRG: 617 | End: 2019-12-17
Attending: PODIATRIST
Payer: COMMERCIAL

## 2019-12-17 LAB
CREAT SERPL-MCNC: 0.79 MG/DL (ref 0.7–1.3)
GLUCOSE BLD STRIP.AUTO-MCNC: 148 MG/DL (ref 65–100)
GLUCOSE BLD STRIP.AUTO-MCNC: 227 MG/DL (ref 65–100)
GLUCOSE BLD STRIP.AUTO-MCNC: 339 MG/DL (ref 65–100)
GLUCOSE BLD STRIP.AUTO-MCNC: 348 MG/DL (ref 65–100)
GLUCOSE BLD STRIP.AUTO-MCNC: 47 MG/DL (ref 65–100)
GLUCOSE BLD STRIP.AUTO-MCNC: 52 MG/DL (ref 65–100)
GLUCOSE BLD STRIP.AUTO-MCNC: 71 MG/DL (ref 65–100)
GLUCOSE BLD STRIP.AUTO-MCNC: 94 MG/DL (ref 65–100)
GLUCOSE BLD STRIP.AUTO-MCNC: 99 MG/DL (ref 65–100)
SERVICE CMNT-IMP: ABNORMAL
SERVICE CMNT-IMP: NORMAL

## 2019-12-17 PROCEDURE — 74011250636 HC RX REV CODE- 250/636: Performed by: INTERNAL MEDICINE

## 2019-12-17 PROCEDURE — 87205 SMEAR GRAM STAIN: CPT

## 2019-12-17 PROCEDURE — 0Y6N0ZF DETACHMENT AT LEFT FOOT, PARTIAL 5TH RAY, OPEN APPROACH: ICD-10-PCS | Performed by: PODIATRIST

## 2019-12-17 PROCEDURE — 74011000250 HC RX REV CODE- 250: Performed by: NURSE ANESTHETIST, CERTIFIED REGISTERED

## 2019-12-17 PROCEDURE — 74011000258 HC RX REV CODE- 258: Performed by: INTERNAL MEDICINE

## 2019-12-17 PROCEDURE — 87075 CULTR BACTERIA EXCEPT BLOOD: CPT

## 2019-12-17 PROCEDURE — 77030039497 HC CST PAD STERILE CHCS -A: Performed by: PODIATRIST

## 2019-12-17 PROCEDURE — 77030013079 HC BLNKT BAIR HGGR 3M -A: Performed by: ANESTHESIOLOGY

## 2019-12-17 PROCEDURE — 77030040922 HC BLNKT HYPOTHRM STRY -A

## 2019-12-17 PROCEDURE — 76060000061 HC AMB SURG ANES 0.5 TO 1 HR: Performed by: PODIATRIST

## 2019-12-17 PROCEDURE — 74011000250 HC RX REV CODE- 250: Performed by: PODIATRIST

## 2019-12-17 PROCEDURE — 74011250637 HC RX REV CODE- 250/637: Performed by: INTERNAL MEDICINE

## 2019-12-17 PROCEDURE — 74011000258 HC RX REV CODE- 258: Performed by: ANESTHESIOLOGY

## 2019-12-17 PROCEDURE — 74011250636 HC RX REV CODE- 250/636: Performed by: NURSE ANESTHETIST, CERTIFIED REGISTERED

## 2019-12-17 PROCEDURE — 74011250637 HC RX REV CODE- 250/637: Performed by: PODIATRIST

## 2019-12-17 PROCEDURE — 74011250636 HC RX REV CODE- 250/636: Performed by: PODIATRIST

## 2019-12-17 PROCEDURE — 88305 TISSUE EXAM BY PATHOLOGIST: CPT

## 2019-12-17 PROCEDURE — 88311 DECALCIFY TISSUE: CPT

## 2019-12-17 PROCEDURE — 77030011640 HC PAD GRND REM COVD -A: Performed by: PODIATRIST

## 2019-12-17 PROCEDURE — 76030000000 HC AMB SURG OR TIME 0.5 TO 1: Performed by: PODIATRIST

## 2019-12-17 PROCEDURE — 73620 X-RAY EXAM OF FOOT: CPT

## 2019-12-17 PROCEDURE — 65270000029 HC RM PRIVATE

## 2019-12-17 PROCEDURE — 74011000258 HC RX REV CODE- 258: Performed by: PODIATRIST

## 2019-12-17 PROCEDURE — 0J9R0ZZ DRAINAGE OF LEFT FOOT SUBCUTANEOUS TISSUE AND FASCIA, OPEN APPROACH: ICD-10-PCS | Performed by: PODIATRIST

## 2019-12-17 PROCEDURE — 77030018836 HC SOL IRR NACL ICUM -A: Performed by: PODIATRIST

## 2019-12-17 PROCEDURE — 94760 N-INVAS EAR/PLS OXIMETRY 1: CPT

## 2019-12-17 PROCEDURE — 77030011992 HC AIRWY NASOPHGL TELE -A: Performed by: ANESTHESIOLOGY

## 2019-12-17 PROCEDURE — 82962 GLUCOSE BLOOD TEST: CPT

## 2019-12-17 PROCEDURE — 77030006773 HC BLD SAW OSC BRSM -A: Performed by: PODIATRIST

## 2019-12-17 PROCEDURE — 74011636637 HC RX REV CODE- 636/637: Performed by: PODIATRIST

## 2019-12-17 PROCEDURE — 76210000036 HC AMBSU PH I REC 1.5 TO 2 HR: Performed by: PODIATRIST

## 2019-12-17 PROCEDURE — 77030040361 HC SLV COMPR DVT MDII -B

## 2019-12-17 PROCEDURE — 74011636637 HC RX REV CODE- 636/637: Performed by: INTERNAL MEDICINE

## 2019-12-17 RX ORDER — PROPOFOL 10 MG/ML
INJECTION, EMULSION INTRAVENOUS
Status: DISCONTINUED | OUTPATIENT
Start: 2019-12-17 | End: 2019-12-17 | Stop reason: HOSPADM

## 2019-12-17 RX ORDER — BUPIVACAINE HYDROCHLORIDE 5 MG/ML
INJECTION, SOLUTION EPIDURAL; INTRACAUDAL AS NEEDED
Status: DISCONTINUED | OUTPATIENT
Start: 2019-12-17 | End: 2019-12-17 | Stop reason: HOSPADM

## 2019-12-17 RX ORDER — ONDANSETRON 2 MG/ML
INJECTION INTRAMUSCULAR; INTRAVENOUS AS NEEDED
Status: DISCONTINUED | OUTPATIENT
Start: 2019-12-17 | End: 2019-12-17 | Stop reason: HOSPADM

## 2019-12-17 RX ORDER — MIDAZOLAM HYDROCHLORIDE 1 MG/ML
INJECTION, SOLUTION INTRAMUSCULAR; INTRAVENOUS AS NEEDED
Status: DISCONTINUED | OUTPATIENT
Start: 2019-12-17 | End: 2019-12-17 | Stop reason: HOSPADM

## 2019-12-17 RX ORDER — LABETALOL 100 MG/1
100 TABLET, FILM COATED ORAL EVERY 12 HOURS
Status: DISCONTINUED | OUTPATIENT
Start: 2019-12-17 | End: 2019-12-20

## 2019-12-17 RX ORDER — LIDOCAINE HYDROCHLORIDE 20 MG/ML
INJECTION, SOLUTION INFILTRATION; PERINEURAL AS NEEDED
Status: DISCONTINUED | OUTPATIENT
Start: 2019-12-17 | End: 2019-12-17 | Stop reason: HOSPADM

## 2019-12-17 RX ORDER — SODIUM CHLORIDE, SODIUM LACTATE, POTASSIUM CHLORIDE, CALCIUM CHLORIDE 600; 310; 30; 20 MG/100ML; MG/100ML; MG/100ML; MG/100ML
INJECTION, SOLUTION INTRAVENOUS
Status: DISCONTINUED | OUTPATIENT
Start: 2019-12-17 | End: 2019-12-17 | Stop reason: HOSPADM

## 2019-12-17 RX ORDER — DEXTROSE MONOHYDRATE 100 MG/ML
250 INJECTION, SOLUTION INTRAVENOUS AS NEEDED
Status: DISCONTINUED | OUTPATIENT
Start: 2019-12-17 | End: 2019-12-21 | Stop reason: HOSPADM

## 2019-12-17 RX ORDER — FENTANYL CITRATE 50 UG/ML
INJECTION, SOLUTION INTRAMUSCULAR; INTRAVENOUS AS NEEDED
Status: DISCONTINUED | OUTPATIENT
Start: 2019-12-17 | End: 2019-12-17 | Stop reason: HOSPADM

## 2019-12-17 RX ORDER — DEXAMETHASONE SODIUM PHOSPHATE 4 MG/ML
INJECTION, SOLUTION INTRA-ARTICULAR; INTRALESIONAL; INTRAMUSCULAR; INTRAVENOUS; SOFT TISSUE AS NEEDED
Status: DISCONTINUED | OUTPATIENT
Start: 2019-12-17 | End: 2019-12-17 | Stop reason: HOSPADM

## 2019-12-17 RX ADMIN — BACLOFEN 40 MG: 10 TABLET ORAL at 05:41

## 2019-12-17 RX ADMIN — OXYBUTYNIN CHLORIDE 5 MG: 5 TABLET, EXTENDED RELEASE ORAL at 08:35

## 2019-12-17 RX ADMIN — DIAZEPAM 5 MG: 5 TABLET ORAL at 17:42

## 2019-12-17 RX ADMIN — METRONIDAZOLE 500 MG: 500 INJECTION, SOLUTION INTRAVENOUS at 04:02

## 2019-12-17 RX ADMIN — TRAZODONE HYDROCHLORIDE 50 MG: 50 TABLET ORAL at 22:28

## 2019-12-17 RX ADMIN — Medication 10 ML: at 22:29

## 2019-12-17 RX ADMIN — CEFEPIME 2 G: 20 INJECTION, POWDER, FOR SOLUTION INTRAVENOUS at 05:40

## 2019-12-17 RX ADMIN — VANCOMYCIN HYDROCHLORIDE 1250 MG: 1.25 INJECTION, POWDER, LYOPHILIZED, FOR SOLUTION INTRAVENOUS at 22:27

## 2019-12-17 RX ADMIN — DIAZEPAM 5 MG: 5 TABLET ORAL at 05:57

## 2019-12-17 RX ADMIN — INSULIN GLARGINE 25 UNITS: 100 INJECTION, SOLUTION SUBCUTANEOUS at 00:50

## 2019-12-17 RX ADMIN — DANTROLENE SODIUM 100 MG: 25 CAPSULE ORAL at 17:42

## 2019-12-17 RX ADMIN — AMITRIPTYLINE HYDROCHLORIDE 25 MG: 50 TABLET, FILM COATED ORAL at 22:28

## 2019-12-17 RX ADMIN — INSULIN LISPRO 7 UNITS: 100 INJECTION, SOLUTION INTRAVENOUS; SUBCUTANEOUS at 17:29

## 2019-12-17 RX ADMIN — HYDROMORPHONE HYDROCHLORIDE 1 MG: 1 INJECTION, SOLUTION INTRAMUSCULAR; INTRAVENOUS; SUBCUTANEOUS at 05:59

## 2019-12-17 RX ADMIN — LABETALOL HYDROCHLORIDE 100 MG: 100 TABLET, FILM COATED ORAL at 06:46

## 2019-12-17 RX ADMIN — INSULIN GLARGINE 25 UNITS: 100 INJECTION, SOLUTION SUBCUTANEOUS at 22:27

## 2019-12-17 RX ADMIN — PROPOFOL 100 MCG/KG/MIN: 10 INJECTION, EMULSION INTRAVENOUS at 12:11

## 2019-12-17 RX ADMIN — Medication 10 ML: at 06:00

## 2019-12-17 RX ADMIN — DANTROLENE SODIUM 100 MG: 25 CAPSULE ORAL at 05:41

## 2019-12-17 RX ADMIN — HYDROMORPHONE HYDROCHLORIDE 1 MG: 1 INJECTION, SOLUTION INTRAMUSCULAR; INTRAVENOUS; SUBCUTANEOUS at 19:09

## 2019-12-17 RX ADMIN — LABETALOL HYDROCHLORIDE 100 MG: 100 TABLET, FILM COATED ORAL at 22:29

## 2019-12-17 RX ADMIN — MIDAZOLAM 2 MG: 1 INJECTION INTRAMUSCULAR; INTRAVENOUS at 12:05

## 2019-12-17 RX ADMIN — METRONIDAZOLE 500 MG: 500 INJECTION, SOLUTION INTRAVENOUS at 17:43

## 2019-12-17 RX ADMIN — BACLOFEN 40 MG: 10 TABLET ORAL at 17:30

## 2019-12-17 RX ADMIN — FLUOXETINE HYDROCHLORIDE 20 MG: 20 CAPSULE ORAL at 05:41

## 2019-12-17 RX ADMIN — DEXTROSE MONOHYDRATE 100 ML: 10 INJECTION, SOLUTION INTRAVENOUS at 09:40

## 2019-12-17 RX ADMIN — HYDROMORPHONE HYDROCHLORIDE 1 MG: 1 INJECTION, SOLUTION INTRAMUSCULAR; INTRAVENOUS; SUBCUTANEOUS at 22:49

## 2019-12-17 RX ADMIN — FENTANYL CITRATE 50 MCG: 50 INJECTION INTRAMUSCULAR; INTRAVENOUS at 12:05

## 2019-12-17 RX ADMIN — SODIUM CHLORIDE, POTASSIUM CHLORIDE, SODIUM LACTATE AND CALCIUM CHLORIDE: 600; 310; 30; 20 INJECTION, SOLUTION INTRAVENOUS at 12:05

## 2019-12-17 RX ADMIN — DANTROLENE SODIUM 100 MG: 25 CAPSULE ORAL at 22:28

## 2019-12-17 RX ADMIN — Medication 10 ML: at 08:37

## 2019-12-17 RX ADMIN — CEFEPIME 2 G: 20 INJECTION, POWDER, FOR SOLUTION INTRAVENOUS at 19:02

## 2019-12-17 RX ADMIN — INSULIN LISPRO 2 UNITS: 100 INJECTION, SOLUTION INTRAVENOUS; SUBCUTANEOUS at 22:27

## 2019-12-17 RX ADMIN — DEXAMETHASONE SODIUM PHOSPHATE 4 MG: 4 INJECTION, SOLUTION INTRAMUSCULAR; INTRAVENOUS at 12:24

## 2019-12-17 RX ADMIN — LIDOCAINE HYDROCHLORIDE 60 MG: 20 INJECTION, SOLUTION INFILTRATION; PERINEURAL at 12:09

## 2019-12-17 RX ADMIN — ONDANSETRON 4 MG: 2 INJECTION INTRAMUSCULAR; INTRAVENOUS at 12:24

## 2019-12-17 RX ADMIN — VANCOMYCIN HYDROCHLORIDE 1250 MG: 1.25 INJECTION, POWDER, LYOPHILIZED, FOR SOLUTION INTRAVENOUS at 06:43

## 2019-12-17 NOTE — PROGRESS NOTES
CHG Bath done and Consent form placed in CHART. Dr and pt will need to sign in preop. Transport team on unit at [address]

## 2019-12-17 NOTE — PROGRESS NOTES
Problem: Pain  Goal: *Control of Pain  Outcome: Progressing Towards Goal     Problem: Patient Education: Go to Patient Education Activity  Goal: Patient/Family Education  Outcome: Progressing Towards Goal     Problem: Infection - Risk of, Surgical Site Infection  Goal: *Absence of surgical site infection signs and symptoms  Outcome: Progressing Towards Goal     Problem: Patient Education: Go to Patient Education Activity  Goal: Patient/Family Education  Outcome: Progressing Towards Goal     Problem: Diabetes Self-Management  Goal: *Disease process and treatment process  Description  Define diabetes and identify own type of diabetes; list 3 options for treating diabetes. Outcome: Progressing Towards Goal  Goal: *Incorporating nutritional management into lifestyle  Description  Describe effect of type, amount and timing of food on blood glucose; list 3 methods for planning meals. Outcome: Progressing Towards Goal  Goal: *Incorporating physical activity into lifestyle  Description  State effect of exercise on blood glucose levels. Outcome: Progressing Towards Goal  Goal: *Developing strategies to promote health/change behavior  Description  Define the ABC's of diabetes; identify appropriate screenings, schedule and personal plan for screenings. Outcome: Progressing Towards Goal  Goal: *Using medications safely  Description  State effect of diabetes medications on diabetes; name diabetes medication taking, action and side effects. Outcome: Progressing Towards Goal  Goal: *Monitoring blood glucose, interpreting and using results  Description  Identify recommended blood glucose targets  and personal targets. Outcome: Progressing Towards Goal  Goal: *Prevention, detection, treatment of acute complications  Description  List symptoms of hyper- and hypoglycemia; describe how to treat low blood sugar and actions for lowering  high blood glucose level.   Outcome: Progressing Towards Goal  Goal: *Prevention, detection Progress Notes by Jesus Jaimes DO at 07/28/18 11:03 AM     Author:  Jesus Jaimes DO Service:  (none) Author Type:  Physician     Filed:  08/01/18 08:05 AM Encounter Date:  7/28/2018 Status:  Signed     :  Jesus Jaimes DO (Physician)            SUBJECTIVE  Marshal Chang is a 70 year old male who presents today c/o sinus and cold symptoms for[JP1.1T] 4 days and he is worried the cold will go into his lungs[JP1.1M] .    Patient feels[JP1.1T] better today after taking some allergy medication but still feels congested in the head and chest.[JP1.1M]   Admits to feeling significant sinus pressure, occasional[JP1.1T] ear pressure[JP1.1M] and occasinal thick[JP1.1T] yellow/ green[JP1.1M] drainage.  Admits to[JP1.1T] a[JP1.1M] sore throat.  Other concerns:[JP1.1T] he has asthma and has used his rescue inhaler only once. He is travelling to [facility] tomorrow. He is driving.[JP1.1M]     --Patient has tried[JP1.1T] otc allergy medication[JP1.1M] for symptomatic relief.  --Patient is taking fluids.    Review of systems  --Fever:[JP1.1T] No[JP1.1M]  --Cough:[JP1.1T]  YES - wet; wheezy at night;[JP1.1M]  Denies orthopnea; no chest pains; no shortness of breath  --GI complaints such as nausea/vomiting/ diarrhea/ abd pain:[JP1.1T] No[JP1.1M]    --Any one sick at home/ close contact with illness at home:[JP1.1T]  No[JP1.1M]    Pertinent Past Medical History  --History of recent sinus infection or similar cold illness in the past 30 days for which you have seen a doctor:[JP1.1T]  No[JP1.1M]  --History of Asthma or Reactive airway disease/ or use of inhalers in past :[JP1.1T] Yes - asthma[JP1.1M]  --Other pertinent history for this chief complaint:[JP1.1T] Yes - env allergies[JP1.1M]    Other Medical History  Patient Active Problem List    Diagnosis    • Asthma   • Hyperlipidemia   • ED (erectile dysfunction)   • Hypertension   • Elevated blood sugar   • Primary osteoarthritis of right knee   • Right knee pain        Soc history:[JP1.1T]  Denies tobacco use/smoking.[JP1.1M]    Allergies: Review of patient's allergies indicates no known allergies.    Medications--   Current outpatient prescriptions prior to encounter       Medication  Sig Dispense Refill   • hydrochlorothiazide (HYDRODIURIL) 25 MG tablet TAKE 1 TABLET BY MOUTH DAILY 90 Tab 0   • PROAIR  (90 BASE) MCG/ACT inhaler INHALE 2 PUFFS BY MOUTH EVERY 6 HOURS AS NEEDED FOR WHEEZING 25.5 g 0   • mometasone (ASMANEX 60 METERED DOSES) 220 MCG/INH inhaler Inhale 1 Puff by mouth 2 (two) times daily. 1 Inhaler 11   • albuterol (PROAIR HFA) 108 (90 BASE) MCG/ACT inhaler INHALE 2 PUFFS BY MOUTH EVERY 6 HOURS AS NEEDED FOR WHEEZING 18 g 5   • zafirlukast (ACCOLATE) 20 MG tablet Take 1 Tab by mouth 2 (two) times daily. 180 Tab 3   • hydrochlorothiazide (HYDRODIURIL) 25 MG tablet Take 1 Tab by mouth daily. 90 Tab 1   • Diclofenac Sodium (PENNSAID) 2 % SOLN Place 2 Squirts onto the skin 2 (two) times daily. 1 Bottle 0   • Red Yeast Rice 600 MG TABS Take 2 tabs in the am and 2 tabs in the pm 360 Tab 3   • Ascorbic Acid (VITAMIN C) 1000 MG tablet Take 1,000 mg by mouth daily.     • B Complex Vitamins (VITAMIN B COMPLEX OR) Take  by mouth.     • Pyridoxine HCl (VITAMIN B-6) 100 MG tablet Take 100 mg by mouth daily.     • Cyanocobalamin (VITAMIN B-12) 1000 MCG SUBL Place  under the tongue.     • VITAMIN E      • Cholecalciferol (VITAMIN D) 2000 UNITS CAPS Take  by mouth.     • CORAL CALCIUM      • Magnesium 250 MG TABS Take  by mouth.     • Ergocalciferol (VITAMIN D OR) Take  by mouth.     • CHROMIUM PICOLINATE      • Bilberry, Vaccinium myrtillus, (BILBERRY EXTRACT OR) Take  by mouth.     • CINNAMON OR Take  by mouth.     • Selenium 200 MCG CAPS Take  by mouth.     • CHOLINE-INOSITOL OR Take  by mouth.     • HAWTHORN BERRY      • Acetylcarnitine HCl (ACETYL L-CARNITINE) 500 MG CAPS Take  by mouth.     • GINGER ROOT      • Ginkgo Biloba 40 MG TABS Take  by mouth.     • GARLIC  and treatment of chronic complications  Description  Define the natural course of diabetes and describe the relationship of blood glucose levels to long term complications of diabetes. Outcome: Progressing Towards Goal  Goal: *Developing strategies to address psychosocial issues  Description  Describe feelings about living with diabetes; identify support needed and support network  Outcome: Progressing Towards Goal  Goal: *Insulin pump training  Outcome: Progressing Towards Goal  Goal: *Sick day guidelines  Outcome: Progressing Towards Goal  Goal: *Patient Specific Goal (EDIT GOAL, INSERT TEXT)  Outcome: Progressing Towards Goal     Problem: Patient Education: Go to Patient Education Activity  Goal: Patient/Family Education  Outcome: Progressing Towards Goal     Problem: Falls - Risk of  Goal: *Absence of Falls  Description  Document Bishop Wright Fall Risk and appropriate interventions in the flowsheet. Outcome: Progressing Towards Goal  Note: Fall Risk Interventions:  Mobility Interventions: Utilize walker, cane, or other assistive device         Medication Interventions: Bed/chair exit alarm    Elimination Interventions: Call light in reach    History of Falls Interventions: Bed/chair exit alarm         Problem: Patient Education: Go to Patient Education Activity  Goal: Patient/Family Education  Outcome: Progressing Towards Goal     Problem: Pressure Injury - Risk of  Goal: *Prevention of pressure injury  Description  Document Dhiraj Scale and appropriate interventions in the flowsheet.   Outcome: Progressing Towards Goal  Note: Pressure Injury Interventions:  Sensory Interventions: Assess need for specialty bed    Moisture Interventions: Absorbent underpads, Assess need for specialty bed, Minimize layers    Activity Interventions: Pressure redistribution bed/mattress(bed type)    Mobility Interventions: Pressure redistribution bed/mattress (bed type)    Nutrition Interventions: Document food/fluid/supplement      • Turmeric Curcumin 500 MG CAPS Take  by mouth.     • NIACIN      • Bilberry, Vaccinium myrtillus, (BILBERRY OR) Take  by mouth.     • Multiple Vitamins-Minerals (VISION VITAMINS OR) Take  by mouth.     • LYCOPENE OR Take  by mouth.     • Lutein 20 MG CAPS Take  by mouth.     • RUTIN OR Take  by mouth.     • LUTEIN-ZEAXANTHIN OR Take  by mouth.     • Misc Natural Products (GLUCOSAMINE CHOND COMPLEX/MSM) TABS Take  by mouth.     • CAYENNE      • sildenafil (VIAGRA) 25 MG tablet Take 1 Tab by mouth as needed for Erectile Dysfunction. 10 Tab 0       OBJECTIVE  /66  Pulse 72  Temp 98.1 °F (36.7 °C) (Tympanic)   Resp 16  SpO2 98%  Physical exam   General appearance: alert & oriented, pleasant & comfortable;[JP1.1T] no[JP1.1M] signs of distress  Head-- Normocephalic/ atraumatic head  Ears--[JP1.1T] Normal bilateral ear exam[JP1.1M]  Nose-- NASAL MUCOSA ERYTHEMATOUS/ SLIGHTLY ENLARGED TURBINATES;   THICK POST NASAL DRIP noted in posterior   Pharynx--  some erythema to arches; no sores  Neck-- supple; no anterior or posterior cervical lymphadenopathy; no meningeal signs  Heart: regular, rate, and rhythm   Lungs:[JP1.1T] FINE EXPIRATORY WHEEZES otherwise clear; good insp effort[JP1.1M]; no pleuritic pain  Skin-- no rashes    ASSESSMENT-[JP1.1T]   URI vs early sinusitis   Mild asthma flare[JP1.1M]    PLAN  1. Push fluids. Symptomatic treatment as discussed.   Can use otc Use mucinex dm as needed for daytime cough/ congestion[JP1.1T].[JP1.1M]  2. Medications prescribed:[JP1.1T] zpak (does well on zpak); prednisone[JP1.1M] ; Side effects discussed.  Questions addressed.[JP1.1T]  3. Continue asmanex inhaler for maintenance.[JP1.1M]   4. Expect improvement of[JP1.1T] asthma in few days and other sinus[JP1.1M] symptoms in the next 5-7 day.  If not improving as discussed then recommend recheck with primary care doctor or walk in care.  If any worsening symptoms or onset of new symptoms then recheck sooner.    The  intake    Friction and Shear Interventions: Lift team/patient mobility team                Problem: Patient Education: Go to Patient Education Activity  Goal: Patient/Family Education  Outcome: Progressing Towards Goal patient indicates understanding of these issues and agrees with the plan.    Electronically Signed by:    Jesus Jaimes DO , 7/28/2018[JP1.1T]        Revision History        User Key Date/Time User Provider Type Action    > JP1.1 08/01/18 08:05 AM Jesus Jaimes DO Physician Sign    M - Manual, T - Template

## 2019-12-17 NOTE — OP NOTES
Operative Report    Patient: Renate Jett MRN: [id number]  SSN: [id number]    YOB: 1980  Age: 35 y.o. Sex: male       Date of Surgery: 12/17/2019     Preoperative Diagnosis: osteomyelitis left 5th mtpj, Abscess left foot    Postoperative Diagnosis: osteomyelitis left 5th mtpj, abscess left foot    Surgeon(s) and Role:     * Tj Maldonado DPM - Primary    Anesthesia: MAC with local    Procedure: Procedure(s):  LEFT FOOT INCISION AND DRAINAGE MULTIPLE SPACES SUBFASCIAL, AMPUTATION OF 5TH TOE AND METATARSAL     Justification of Procedure: Patient is a 36 y/o male with quadriplegia who has chronic wounds to both feet. The left foot has plantar and dorsal wounds to the 5th mtpj with nick purulence noted. Radiographic imaging and MRI have shown osteomyelitis of the 5th metatarsal head and abscesses noted about the joint. Due to findings and in order for limb salvage, I recommended surgical resection of the infection with 5th toe and metatarsal amputation, incision and drainage. I discussed the procedures at length including the expected post operative course, risks, alternatives and possible complications. No guarantees given and the patient elected to proceed. I did discussed the possibility and likelihood of a staged procedure. Procedure in Detail: This 36 y/o male was brought to the operating room and placed supine on the operating room table. After adequate IV sedation a local infiltrated block was performed to the left foot using 0.5% Marcaine plane. Next, the left foot was prepped and draped in usual aseptic fashion. Next attention was directed to the left fifth toe where there was noted swelling and a proximal wound with purulent drainage. A culture was obtained of this drainage. Amptutation left 5th toe and metatarsal: two elliptical incisions were made around the fifth toe and extending onto the lateral left foot. These incisions were made down to bone.  The fifth toe was then disarticulated at the metatarsophalangeal joint and passed from the field. Next soft tissue attachments were dissected free from the distal fifth metatarsal. Next the fifth metatarsal was resected an oblique fashion and passed from the field for pathology. Incision and Drainage: The incision was then extended to expose the extensor tendons where the purulent drainage was encountered. The extensor tendons of multiple tendon she is were encountered and there were areas of purulence dorsally and plantarly in the flexor tendons. These areas were drained in the wound was then irrigated with copious amounts of normal saline. The foot was then milked for further purulence and it was not noted. Next the wound was packed with dakin's wet to dry dressing and dry sterile dressing. This is a staged procedure and will require daily dressing changes and return to OR when clean for delayed primary closure vs grafting. Patient tolerated the procedure and anesthesia well and was transferred to the PACU for postoperative monitoring. From there he will be transferred to the floor for further medical and surgical management. Estimated Blood Loss:  20mL    Tourniquet Time: * No tourniquets in log *      Implants: * No implants in log *            Specimens: left 5th toe and metatarsal      Drains: None                Complications: None    Counts: Sponge and needle counts were correct times two.     Signed By:  Macarena Gunn DPM     December 17, 2019

## 2019-12-17 NOTE — PROGRESS NOTES
Dr. Tomy Newberry made aware of BS 47, pt is awake and asymptomatic, will try po juice and gram crackers and repeat BS in 15 minutes. Will continue to monitor.

## 2019-12-17 NOTE — ANESTHESIA POSTPROCEDURE EVALUATION
Procedure(s):  LEFT FOOT INCISION AND DRAINAGE, AMPUTATION OF 5TH TOE AND METATARSAL. MAC    Anesthesia Post Evaluation      Multimodal analgesia: multimodal analgesia not used between 6 hours prior to anesthesia start to PACU discharge  Patient location during evaluation: PACU  Patient participation: complete - patient participated  Level of consciousness: awake  Pain management: adequate  Airway patency: patent  Anesthetic complications: no  Cardiovascular status: acceptable, blood pressure returned to baseline and hemodynamically stable  Respiratory status: acceptable  Hydration status: acceptable  Post anesthesia nausea and vomiting:  controlled      Vitals Value Taken Time   /90 12/17/2019  1:40 PM   Temp 36.7 °C (98 °F) 12/17/2019 12:49 PM   Pulse 65 12/17/2019  1:42 PM   Resp 13 12/17/2019  1:42 PM   SpO2 95 % 12/17/2019  1:42 PM   Vitals shown include unvalidated device data.
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
## 2019-12-17 NOTE — PROGRESS NOTES
Spiritual Care Partner Volunteer visited patient on the [address] [place] [address]. Surgical Ortho unit on 12/16/19. Charted 12/17/19    Documented by:  Boyd Roman. Lindsay Albarado.      Paging Service: [phone]

## 2019-12-17 NOTE — ROUTINE PROCESS
TRANSFER - OUT REPORT:    Verbal report given to Dontrell(name) on Hay Shah  being transferred to  [id number]  (unit) for routine progression of care       Report consisted of patients Situation, Background, Assessment and   Recommendations(SBAR). Information from the following report(s) SBAR was reviewed with the receiving nurse. Lines:   Peripheral IV 12/14/19 Left Antecubital (Active)   Site Assessment Clean, dry, & intact 12/16/2019  8:30 PM   Phlebitis Assessment 0 12/16/2019  8:30 PM   Infiltration Assessment 0 12/16/2019  8:30 PM   Dressing Status Clean, dry, & intact 12/16/2019  8:30 PM   Dressing Type Transparent 12/16/2019  8:30 PM   Hub Color/Line Status Pink 12/16/2019  8:30 PM   Action Taken Open ports on tubing capped 12/16/2019  8:30 PM   Alcohol Cap Used Yes 12/16/2019  8:30 PM       Peripheral IV 12/14/19 Right Forearm (Active)   Site Assessment Clean, dry, & intact 12/16/2019  8:30 PM   Phlebitis Assessment 0 12/16/2019  8:30 PM   Infiltration Assessment 0 12/16/2019  8:30 PM   Dressing Status Clean, dry, & intact 12/16/2019  8:30 PM   Dressing Type Transparent 12/16/2019  8:30 PM   Hub Color/Line Status Pink 12/16/2019  8:30 PM   Action Taken Open ports on tubing capped 12/16/2019  8:30 PM   Alcohol Cap Used Yes 12/16/2019  8:30 PM        Opportunity for questions and clarification was provided.       Patient transported with:   Registered Nurse

## 2019-12-17 NOTE — BRIEF OP NOTE
BRIEF OPERATIVE NOTE    Date of Procedure: 12/17/2019   Preoperative Diagnosis: osteomyelitis  Postoperative Diagnosis: osteomyelitis    Procedure(s):  LEFT FOOT INCISION AND DRAINAGE multiple spaces dorsal and plantar, AMPUTATION OF 5TH TOE AND METATARSAL  Surgeon(s) and Role:     * Georgiana Maldonado DPM - Primary           Surgical Staff:  Circ-1: Nataliia Watson RN  Scrub RN-1: Yovanny Bob RN  Event Time In Time Out   Incision Start 1221    Incision Close 1235      Anesthesia: General   Estimated Blood Loss: 20mL  Specimens: toe and 5th metatarsal  Findings: purulence noted encircling the 5th mtpj.   Complications: none  Implants: none

## 2019-12-17 NOTE — PROGRESS NOTES
Recheck of BS is 71 after drinking 240 apple juice.   Will repeat apple juice and recheck BS in 15 min

## 2019-12-17 NOTE — PROGRESS NOTES
Primary Nurse Vita Ware and Carol Figueroa RN performed a dual skin assessment on this patient. Dhiraj Score 14.

## 2019-12-17 NOTE — PROGRESS NOTES
12/17/2019 7:46 AM EMR reviewed, pt to undergo procedure with Podiatry today. CM will follow for final discharge recommendations. JUANITA Alexander    Discharge plan:  Return home with home health through [facility]. Additional services pending.

## 2019-12-17 NOTE — ANESTHESIA PREPROCEDURE EVALUATION
Relevant Problems   No relevant active problems       Anesthetic History     PONV          Review of Systems / Medical History  Patient summary reviewed and pertinent labs reviewed    Pulmonary          Smoker      Comments: Spinal cord injury  Quad with arm movement.    Neuro/Psych   Within defined limits           Cardiovascular    Hypertension                   GI/Hepatic/Renal  Within defined limits              Endo/Other    Diabetes         Other Findings              Physical Exam    Airway  Mallampati: II  TM Distance: 4 - 6 cm  Neck ROM: normal range of motion   Mouth opening: Normal     Cardiovascular  Regular rate and rhythm,  S1 and S2 normal,  no murmur, click, rub, or gallop  Rhythm: regular  Rate: normal         Dental  No notable dental hx       Pulmonary  Breath sounds clear to auscultation               Abdominal  GI exam deferred       Other Findings            Anesthetic Plan    ASA: 3  Anesthesia type: MAC          Induction: Intravenous  Anesthetic plan and risks discussed with: Patient

## 2019-12-17 NOTE — ROUTINE PROCESS
Bedside and Verbal shift change report given to Jamie Daniels (oncoming nurse) by Carli Orta (offgoing nurse). Report included the following information SBAR, Kardex and MAR.

## 2019-12-18 LAB
CREAT SERPL-MCNC: 0.81 MG/DL (ref 0.7–1.3)
GLUCOSE BLD STRIP.AUTO-MCNC: 138 MG/DL (ref 65–100)
GLUCOSE BLD STRIP.AUTO-MCNC: 169 MG/DL (ref 65–100)
GLUCOSE BLD STRIP.AUTO-MCNC: 211 MG/DL (ref 65–100)
GLUCOSE BLD STRIP.AUTO-MCNC: 284 MG/DL (ref 65–100)
SERVICE CMNT-IMP: ABNORMAL

## 2019-12-18 PROCEDURE — 74011000258 HC RX REV CODE- 258: Performed by: PODIATRIST

## 2019-12-18 PROCEDURE — 74011250637 HC RX REV CODE- 250/637: Performed by: PODIATRIST

## 2019-12-18 PROCEDURE — 82962 GLUCOSE BLOOD TEST: CPT

## 2019-12-18 PROCEDURE — 94760 N-INVAS EAR/PLS OXIMETRY 1: CPT

## 2019-12-18 PROCEDURE — 82565 ASSAY OF CREATININE: CPT

## 2019-12-18 PROCEDURE — 74011250636 HC RX REV CODE- 250/636: Performed by: PODIATRIST

## 2019-12-18 PROCEDURE — 74011636637 HC RX REV CODE- 636/637: Performed by: PODIATRIST

## 2019-12-18 PROCEDURE — 65270000029 HC RM PRIVATE

## 2019-12-18 PROCEDURE — 36415 COLL VENOUS BLD VENIPUNCTURE: CPT

## 2019-12-18 RX ADMIN — FLUOXETINE HYDROCHLORIDE 20 MG: 20 CAPSULE ORAL at 06:03

## 2019-12-18 RX ADMIN — AMITRIPTYLINE HYDROCHLORIDE 25 MG: 50 TABLET, FILM COATED ORAL at 22:12

## 2019-12-18 RX ADMIN — CEFEPIME 2 G: 20 INJECTION, POWDER, FOR SOLUTION INTRAVENOUS at 11:01

## 2019-12-18 RX ADMIN — BACLOFEN 40 MG: 10 TABLET ORAL at 06:03

## 2019-12-18 RX ADMIN — CEFEPIME 2 G: 20 INJECTION, POWDER, FOR SOLUTION INTRAVENOUS at 02:05

## 2019-12-18 RX ADMIN — Medication 10 ML: at 22:11

## 2019-12-18 RX ADMIN — OXYBUTYNIN CHLORIDE 5 MG: 5 TABLET, EXTENDED RELEASE ORAL at 08:51

## 2019-12-18 RX ADMIN — DANTROLENE SODIUM 100 MG: 25 CAPSULE ORAL at 17:48

## 2019-12-18 RX ADMIN — BACLOFEN 40 MG: 10 TABLET ORAL at 17:49

## 2019-12-18 RX ADMIN — METRONIDAZOLE 500 MG: 500 INJECTION, SOLUTION INTRAVENOUS at 16:37

## 2019-12-18 RX ADMIN — BACLOFEN 40 MG: 10 TABLET ORAL at 13:23

## 2019-12-18 RX ADMIN — DIAZEPAM 5 MG: 5 TABLET ORAL at 22:54

## 2019-12-18 RX ADMIN — LABETALOL HYDROCHLORIDE 100 MG: 100 TABLET, FILM COATED ORAL at 22:12

## 2019-12-18 RX ADMIN — METRONIDAZOLE 500 MG: 500 INJECTION, SOLUTION INTRAVENOUS at 03:05

## 2019-12-18 RX ADMIN — INSULIN LISPRO 3 UNITS: 100 INJECTION, SOLUTION INTRAVENOUS; SUBCUTANEOUS at 22:31

## 2019-12-18 RX ADMIN — DANTROLENE SODIUM 100 MG: 25 CAPSULE ORAL at 22:12

## 2019-12-18 RX ADMIN — VANCOMYCIN HYDROCHLORIDE 1250 MG: 1.25 INJECTION, POWDER, LYOPHILIZED, FOR SOLUTION INTRAVENOUS at 06:03

## 2019-12-18 RX ADMIN — DANTROLENE SODIUM 100 MG: 25 CAPSULE ORAL at 13:22

## 2019-12-18 RX ADMIN — DANTROLENE SODIUM 100 MG: 25 CAPSULE ORAL at 06:03

## 2019-12-18 RX ADMIN — VANCOMYCIN HYDROCHLORIDE 1250 MG: 1.25 INJECTION, POWDER, LYOPHILIZED, FOR SOLUTION INTRAVENOUS at 13:31

## 2019-12-18 RX ADMIN — INSULIN LISPRO 2 UNITS: 100 INJECTION, SOLUTION INTRAVENOUS; SUBCUTANEOUS at 17:48

## 2019-12-18 RX ADMIN — Medication 10 ML: at 13:43

## 2019-12-18 RX ADMIN — INSULIN LISPRO 3 UNITS: 100 INJECTION, SOLUTION INTRAVENOUS; SUBCUTANEOUS at 08:50

## 2019-12-18 RX ADMIN — VANCOMYCIN HYDROCHLORIDE 1250 MG: 1.25 INJECTION, POWDER, LYOPHILIZED, FOR SOLUTION INTRAVENOUS at 22:11

## 2019-12-18 RX ADMIN — Medication 10 ML: at 06:03

## 2019-12-18 RX ADMIN — HYDROMORPHONE HYDROCHLORIDE 1 MG: 1 INJECTION, SOLUTION INTRAMUSCULAR; INTRAVENOUS; SUBCUTANEOUS at 13:31

## 2019-12-18 RX ADMIN — TRAZODONE HYDROCHLORIDE 50 MG: 50 TABLET ORAL at 22:00

## 2019-12-18 RX ADMIN — CEFEPIME 2 G: 20 INJECTION, POWDER, FOR SOLUTION INTRAVENOUS at 17:48

## 2019-12-18 RX ADMIN — HYDROMORPHONE HYDROCHLORIDE 1 MG: 1 INJECTION, SOLUTION INTRAMUSCULAR; INTRAVENOUS; SUBCUTANEOUS at 03:05

## 2019-12-18 RX ADMIN — INSULIN GLARGINE 25 UNITS: 100 INJECTION, SOLUTION SUBCUTANEOUS at 22:11

## 2019-12-18 RX ADMIN — LABETALOL HYDROCHLORIDE 100 MG: 100 TABLET, FILM COATED ORAL at 08:51

## 2019-12-18 RX ADMIN — HYDROMORPHONE HYDROCHLORIDE 1 MG: 1 INJECTION, SOLUTION INTRAMUSCULAR; INTRAVENOUS; SUBCUTANEOUS at 20:12

## 2019-12-18 NOTE — DIABETES MGMT
[facility]    [facility] Progress Note    Recommendations/ Comments: Review for hypoglycemia x 2 yesterday. Lantus dose has been further decreased last night. BS >180 mg/dl today. Pt may benefit from 4 units of  meal time insulin to aid in glycemic control. Current hospital DM medication: lispro insulin correction scale; lantus 25 units at bedtime    Chart reviewed on [place]. Patient is a 35 y.o. male with known  Type 1 Diabetes on insulin injections: Lantus : 36 units at night at home. A1c:   Lab Results   Component Value Date/Time    Hemoglobin A1c 10.4 (H) 12/14/2019 11:05 AM    Hemoglobin A1c 12.0 (H) 01/06/2015 10:14 AM       Recent Glucose Results:   Lab Results   Component Value Date/Time    GLUCPOC 138 (H) 12/18/2019 11:47 AM    GLUCPOC 211 (H) 12/18/2019 06:44 AM    GLUCPOC 348 (H) 12/17/2019 10:05 PM        Lab Results   Component Value Date/Time    Creatinine 0.81 12/18/2019 02:35 AM     Estimated Creatinine Clearance: 125.2 mL/min (based on SCr of 0.81 mg/dL). Active Orders   Diet    DIET DIABETIC CONSISTENT CARB Regular        PO intake:   Patient Vitals for the past 72 hrs:   % Diet Eaten   12/16/19 1616 25 %       Will continue to follow as needed.     Thank you
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
## 2019-12-18 NOTE — PROGRESS NOTES
Nutrition Assessment:    RECOMMENDATIONS/INTERVENTION(S):   1. Continue consistent CHO diet    2. Recommend ONS BID to promote diabetic wound healing:  Keanu provides 190kcal, 5g protein + arginine, glutamine    3. Monitor PO intake, BG, labs, wt      ASSESSMENT:   12/18: 43yo M admitted for oozing foot wound- osteomyelitis. S/p 5th toe and metatarsal amputation (12/17). PMH includes quadriplegia and T1DM. Current diet: consistent CHO. Pt reports low appetite past 1mo, but states it is slowly improving. 25% intakes reported in EMR, pt stated he has not enjoyed much of the food in house but his friends have brought him fruit. Requires feeding assistance due to disability. Pt reports his girlfriend prepares all meals at home and he doesn't like eating out. Breakfast usually consists of a parfait (yogurt, oats, blueberries and strawberries); Usually skips lunch while at work. Dinner varies, but pt lists salmon, salad, pasta, and spaghetti as common items. Pt reports he does not follow any kind of diet at home or monitor CHO intake. A1c 10.4(12/14), offered education, but pt declined. Has received education in the past. Pt reports he \"tried counting 1600 CHO/day but I would starve. \" Pt reports always checking his BG first thing in the morning before breakfast and before taking medicine at night. When he has a Freestyle BG monitor he checks it more often (3-4x/day); without that he will check it 2-3x/day. Pt reports #, wt in EMR is pt stated at 170#. Per EMR wt hx shows stability. Pt thinks he is losing wt because he \"hasn't been eating. \" LBM 12/15 per EMR; not on bowel regimen. Diabetic L foot wound; diabetic wound pretibial/distal R; incision- L foot. Pt agreeable to receiving Keanu to promote diabetic wound healing.    Meds: cefepime, D12, lantus, humalog, metronidazole, zofran, NS, vancomycin  Labs: A1c 10.4, ,520,650      Diet Order: Consistent carb  % Eaten:    Patient Vitals for the past 72 hrs:   % Diet Eaten   12/16/19 1616 25 %       Pertinent Medications: [x] Reviewed    Labs: [x] Reviewed    Anthropometrics: Height: 5' 10\" (177.8 cm) Weight: 77.1 kg (170 lb)    IBW (%IBW):   ( ) UBW (%UBW):   (  %)      BMI: Body mass index is 24.39 kg/m². This BMI is indicative of:   [] Underweight    [x] Normal    [] Overweight    []  Obesity    []  Extreme Obesity (BMI>40)  Estimated Nutrition Needs (Based on): 2024 Kcals/day(REE x1.2) , 108 g(1.4g/kg) Protein  Carbohydrate:  At Least 130 g/day  Fluids: 2024 mL/day (1 ml/kcal)    Last BM: 12/15   [x]Active     []Hyperactive  []Hypoactive       [] Absent   BS  Skin:    [] Intact   [x] Incision- L foot  [] Breakdown   [] DTI   [] Tears/Excoriation/Abrasion  []Edema [x] Other: diabetic wound- R pretibial, distal; diabetic foot wound L   Wt Readings from Last 30 Encounters:   12/14/19 77.1 kg (170 lb)   05/02/19 72.6 kg (160 lb)   03/14/19 77.6 kg (171 lb)   01/25/19 77.6 kg (171 lb)   09/09/18 77.1 kg (170 lb)   03/16/18 81.2 kg (179 lb)   10/28/17 78.9 kg (174 lb)   10/16/17 80.3 kg (177 lb)   01/18/16 81.6 kg (180 lb)   01/29/15 79.7 kg (175 lb 11.2 oz)   09/24/14 77 kg (169 lb 11.2 oz)   06/11/13 83.2 kg (183 lb 6.4 oz)   03/05/13 81.9 kg (180 lb 9.6 oz)   05/07/12 81.8 kg (180 lb 4.8 oz)   03/14/12 80.2 kg (176 lb 14.4 oz)      NUTRITION DIAGNOSES:   Problem:  Increased nutrient needs     Etiology: related to increased protein needs     Signs/Symptoms: as evidenced by diabetic wounds, s/p 5th toe and metatarsal amputation      NUTRITION INTERVENTIONS:  Meals/Snacks: General/healthful diet   Supplements: Commercial supplement              GOAL:   Intake >75% meals +Keanu next 4-5 days    Cultural, Confucianist, or Ethnic Dietary Needs: None     EDUCATION & DISCHARGE NEEDS:    [] None Identified   [] Identified and Education Provided/Documented   [x] Identified and Pt declined/was not appropriate - pt declined consistent CHO diet edu      [] [facility] Reviewed/Documented    [x] Discharge Needs: Consistent CHO diet   [] No Nutrition Related Discharge Needs    NUTRITION RISK:   Pt Is At Nutrition Risk  [x]     No Nutrition Risk Identified  []       PT SEEN FOR:    []  MD Consult: []Calorie Count      []Diabetic Diet Education        []Diet Education     []Electrolyte Management     []General Nutrition Management and Supplements     []Management of Tube Feeding     []TPN Recommendations    []  RN Referral:  []MST score >=2     []Enteral/Parenteral Nutrition PTA     []Pregnant: Gestational DM or Multigestation                 [] Pressure Ulcer    []  Low BMI      [x]  Length of Stay       [] Dysphagia Diet         [] Ventilator  []  Follow-up     Previous Recommendations:   [] Implemented          [] Not Implemented          [x] Not Applicable    Previous Goal:   [] Met              [] Progressing Towards Goal              [] Not Progressing Towards Goal   [x] Not Applicable            Eloisa Harrell,   Pager [phone]  Phone [phone]

## 2019-12-19 ENCOUNTER — ANESTHESIA EVENT (OUTPATIENT)
Dept: SURGERY | Age: 39
DRG: 617 | End: 2019-12-19
Payer: COMMERCIAL

## 2019-12-19 LAB
ANION GAP SERPL CALC-SCNC: 3 MMOL/L (ref 5–15)
BACTERIA SPEC CULT: NORMAL
BUN SERPL-MCNC: 4 MG/DL (ref 6–20)
BUN/CREAT SERPL: 6 (ref 12–20)
CALCIUM SERPL-MCNC: 7.7 MG/DL (ref 8.5–10.1)
CHLORIDE SERPL-SCNC: 107 MMOL/L (ref 97–108)
CO2 SERPL-SCNC: 29 MMOL/L (ref 21–32)
CREAT SERPL-MCNC: 0.71 MG/DL (ref 0.7–1.3)
DATE LAST DOSE: ABNORMAL
ERYTHROCYTE [DISTWIDTH] IN BLOOD BY AUTOMATED COUNT: 15 % (ref 11.5–14.5)
GLUCOSE BLD STRIP.AUTO-MCNC: 130 MG/DL (ref 65–100)
GLUCOSE BLD STRIP.AUTO-MCNC: 133 MG/DL (ref 65–100)
GLUCOSE BLD STRIP.AUTO-MCNC: 167 MG/DL (ref 65–100)
GLUCOSE BLD STRIP.AUTO-MCNC: 91 MG/DL (ref 65–100)
GLUCOSE SERPL-MCNC: 211 MG/DL (ref 65–100)
HCT VFR BLD AUTO: 34.4 % (ref 36.6–50.3)
HGB BLD-MCNC: 11.1 G/DL (ref 12.1–17)
MAGNESIUM SERPL-MCNC: 1.9 MG/DL (ref 1.6–2.4)
MCH RBC QN AUTO: 30 PG (ref 26–34)
MCHC RBC AUTO-ENTMCNC: 32.3 G/DL (ref 30–36.5)
MCV RBC AUTO: 93 FL (ref 80–99)
NRBC # BLD: 0 K/UL (ref 0–0.01)
NRBC BLD-RTO: 0 PER 100 WBC
PHOSPHATE SERPL-MCNC: 3.2 MG/DL (ref 2.6–4.7)
PLATELET # BLD AUTO: 188 K/UL (ref 150–400)
PMV BLD AUTO: 10 FL (ref 8.9–12.9)
POTASSIUM SERPL-SCNC: 3.8 MMOL/L (ref 3.5–5.1)
RBC # BLD AUTO: 3.7 M/UL (ref 4.1–5.7)
REPORTED DOSE,DOSE: ABNORMAL UNITS
REPORTED DOSE/TIME,TMG: ABNORMAL
SERVICE CMNT-IMP: ABNORMAL
SERVICE CMNT-IMP: NORMAL
SERVICE CMNT-IMP: NORMAL
SODIUM SERPL-SCNC: 139 MMOL/L (ref 136–145)
VANCOMYCIN TROUGH SERPL-MCNC: 16.7 UG/ML (ref 5–10)
WBC # BLD AUTO: 5.6 K/UL (ref 4.1–11.1)

## 2019-12-19 PROCEDURE — 80048 BASIC METABOLIC PNL TOTAL CA: CPT

## 2019-12-19 PROCEDURE — 85027 COMPLETE CBC AUTOMATED: CPT

## 2019-12-19 PROCEDURE — 94760 N-INVAS EAR/PLS OXIMETRY 1: CPT

## 2019-12-19 PROCEDURE — 74011250636 HC RX REV CODE- 250/636: Performed by: INTERNAL MEDICINE

## 2019-12-19 PROCEDURE — 74011250636 HC RX REV CODE- 250/636: Performed by: PODIATRIST

## 2019-12-19 PROCEDURE — 74011636637 HC RX REV CODE- 636/637: Performed by: INTERNAL MEDICINE

## 2019-12-19 PROCEDURE — 74011636637 HC RX REV CODE- 636/637: Performed by: PODIATRIST

## 2019-12-19 PROCEDURE — 84100 ASSAY OF PHOSPHORUS: CPT

## 2019-12-19 PROCEDURE — 82962 GLUCOSE BLOOD TEST: CPT

## 2019-12-19 PROCEDURE — 74011250637 HC RX REV CODE- 250/637: Performed by: PODIATRIST

## 2019-12-19 PROCEDURE — 80202 ASSAY OF VANCOMYCIN: CPT

## 2019-12-19 PROCEDURE — 74011000258 HC RX REV CODE- 258: Performed by: PODIATRIST

## 2019-12-19 PROCEDURE — 74011000250 HC RX REV CODE- 250: Performed by: PODIATRIST

## 2019-12-19 PROCEDURE — 36415 COLL VENOUS BLD VENIPUNCTURE: CPT

## 2019-12-19 PROCEDURE — 83735 ASSAY OF MAGNESIUM: CPT

## 2019-12-19 PROCEDURE — 65270000029 HC RM PRIVATE

## 2019-12-19 RX ORDER — ENOXAPARIN SODIUM 100 MG/ML
40 INJECTION SUBCUTANEOUS EVERY 24 HOURS
Status: DISCONTINUED | OUTPATIENT
Start: 2019-12-19 | End: 2019-12-21 | Stop reason: HOSPADM

## 2019-12-19 RX ORDER — HYDRALAZINE HYDROCHLORIDE 20 MG/ML
20 INJECTION INTRAMUSCULAR; INTRAVENOUS
Status: DISCONTINUED | OUTPATIENT
Start: 2019-12-19 | End: 2019-12-21 | Stop reason: HOSPADM

## 2019-12-19 RX ORDER — ACETAMINOPHEN 325 MG/1
975 TABLET ORAL ONCE
Status: CANCELLED | OUTPATIENT
Start: 2019-12-19 | End: 2019-12-19

## 2019-12-19 RX ORDER — INSULIN LISPRO 100 [IU]/ML
4 INJECTION, SOLUTION INTRAVENOUS; SUBCUTANEOUS
Status: DISCONTINUED | OUTPATIENT
Start: 2019-12-19 | End: 2019-12-21 | Stop reason: HOSPADM

## 2019-12-19 RX ORDER — OXYCODONE HCL 20 MG/1
20 TABLET, FILM COATED, EXTENDED RELEASE ORAL ONCE
Status: CANCELLED | OUTPATIENT
Start: 2019-12-19 | End: 2019-12-19

## 2019-12-19 RX ADMIN — DIAZEPAM 5 MG: 5 TABLET ORAL at 06:25

## 2019-12-19 RX ADMIN — Medication 10 ML: at 06:25

## 2019-12-19 RX ADMIN — CEFEPIME 2 G: 20 INJECTION, POWDER, FOR SOLUTION INTRAVENOUS at 10:08

## 2019-12-19 RX ADMIN — DANTROLENE SODIUM 100 MG: 25 CAPSULE ORAL at 17:42

## 2019-12-19 RX ADMIN — METRONIDAZOLE 500 MG: 500 INJECTION, SOLUTION INTRAVENOUS at 04:29

## 2019-12-19 RX ADMIN — VANCOMYCIN HYDROCHLORIDE 1250 MG: 1.25 INJECTION, POWDER, LYOPHILIZED, FOR SOLUTION INTRAVENOUS at 14:11

## 2019-12-19 RX ADMIN — DIAZEPAM 5 MG: 5 TABLET ORAL at 15:54

## 2019-12-19 RX ADMIN — ENOXAPARIN SODIUM 40 MG: 40 INJECTION SUBCUTANEOUS at 12:42

## 2019-12-19 RX ADMIN — BACLOFEN 40 MG: 10 TABLET ORAL at 17:42

## 2019-12-19 RX ADMIN — OXYBUTYNIN CHLORIDE 5 MG: 5 TABLET, EXTENDED RELEASE ORAL at 08:53

## 2019-12-19 RX ADMIN — HYDROMORPHONE HYDROCHLORIDE 1 MG: 1 INJECTION, SOLUTION INTRAMUSCULAR; INTRAVENOUS; SUBCUTANEOUS at 00:57

## 2019-12-19 RX ADMIN — METRONIDAZOLE 500 MG: 500 INJECTION, SOLUTION INTRAVENOUS at 15:54

## 2019-12-19 RX ADMIN — DANTROLENE SODIUM 100 MG: 25 CAPSULE ORAL at 21:33

## 2019-12-19 RX ADMIN — HYDRALAZINE HYDROCHLORIDE 20 MG: 20 INJECTION INTRAMUSCULAR; INTRAVENOUS at 07:57

## 2019-12-19 RX ADMIN — BACLOFEN 40 MG: 10 TABLET ORAL at 06:50

## 2019-12-19 RX ADMIN — TRAZODONE HYDROCHLORIDE 50 MG: 50 TABLET ORAL at 21:34

## 2019-12-19 RX ADMIN — DANTROLENE SODIUM 100 MG: 25 CAPSULE ORAL at 06:25

## 2019-12-19 RX ADMIN — INSULIN LISPRO 4 UNITS: 100 INJECTION, SOLUTION INTRAVENOUS; SUBCUTANEOUS at 08:52

## 2019-12-19 RX ADMIN — VANCOMYCIN HYDROCHLORIDE 1250 MG: 1.25 INJECTION, POWDER, LYOPHILIZED, FOR SOLUTION INTRAVENOUS at 21:30

## 2019-12-19 RX ADMIN — CEFEPIME 2 G: 20 INJECTION, POWDER, FOR SOLUTION INTRAVENOUS at 02:02

## 2019-12-19 RX ADMIN — INSULIN LISPRO 4 UNITS: 100 INJECTION, SOLUTION INTRAVENOUS; SUBCUTANEOUS at 12:42

## 2019-12-19 RX ADMIN — Medication 10 ML: at 14:11

## 2019-12-19 RX ADMIN — DANTROLENE SODIUM 100 MG: 25 CAPSULE ORAL at 12:42

## 2019-12-19 RX ADMIN — INSULIN LISPRO 2 UNITS: 100 INJECTION, SOLUTION INTRAVENOUS; SUBCUTANEOUS at 08:53

## 2019-12-19 RX ADMIN — FLUOXETINE HYDROCHLORIDE 20 MG: 20 CAPSULE ORAL at 06:25

## 2019-12-19 RX ADMIN — LABETALOL HYDROCHLORIDE 100 MG: 100 TABLET, FILM COATED ORAL at 08:53

## 2019-12-19 RX ADMIN — BACLOFEN 40 MG: 10 TABLET ORAL at 12:42

## 2019-12-19 RX ADMIN — Medication 10 ML: at 21:34

## 2019-12-19 RX ADMIN — VANCOMYCIN HYDROCHLORIDE 1250 MG: 1.25 INJECTION, POWDER, LYOPHILIZED, FOR SOLUTION INTRAVENOUS at 06:24

## 2019-12-19 RX ADMIN — CEFEPIME 2 G: 20 INJECTION, POWDER, FOR SOLUTION INTRAVENOUS at 17:42

## 2019-12-19 RX ADMIN — LABETALOL HYDROCHLORIDE 100 MG: 100 TABLET, FILM COATED ORAL at 21:33

## 2019-12-19 RX ADMIN — DIAZEPAM 5 MG: 5 TABLET ORAL at 23:06

## 2019-12-19 RX ADMIN — HYDROMORPHONE HYDROCHLORIDE 1 MG: 1 INJECTION, SOLUTION INTRAMUSCULAR; INTRAVENOUS; SUBCUTANEOUS at 05:42

## 2019-12-19 RX ADMIN — AMITRIPTYLINE HYDROCHLORIDE 25 MG: 50 TABLET, FILM COATED ORAL at 21:34

## 2019-12-19 NOTE — PROGRESS NOTES
Bedside shift change report given to Araceli (oncoming nurse) by Jovi Pringle (offgoing nurse). Report included the following information SBAR, Kardex, OR Summary, Intake/Output and MAR.

## 2019-12-19 NOTE — ROUTINE PROCESS
Bedside and Verbal shift change report given to Bridger (oncoming nurse) by Marisela Mukherjee (offgoing nurse). Report included the following information SBAR, Kardex and MAR.
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
## 2019-12-19 NOTE — PROGRESS NOTES
The [address] [place] [address] Podiatric Surgery  Progress Note  Subjective:  Eric Hinds following for left foot status post 5th ray amputation and right heel ulceration. Afebrile. No pain. ROS:   Constitutional: Negative for fever, chills, weight loss and malaise/fatigue. HENT: Negative for nosebleeds and congestion. Eyes: Negative for blurred vision and double vision. Respiratory: Negative for cough, shortness of breath and wheezing. Cardiovascular: Negative for chest pain, palpitations, claudication. Positive for leg swelling  Gastrointestinal: Negative for heartburn, nausea, vomiting, abdominal pain and diarrhea. Genitourinary: suprapubic catheter  Musculoskeletal: Negative for myalgias and joint pain. Positive for contractures  Skin: Negative for itching and rash. Positive for ulcerations  Neurological: Negative for dizziness, focal weakness and headaches. Endo/Heme/Allergies: Negative for environmental allergies. Does not bruise/bleed easily. Psychiatric/Behavioral: Negative for depression and suicidal ideas. The patient is not nervous/anxious. Objective:  Blood pressure (!) 203/130, pulse 69, temperature 97.5 °F (36.4 °C), resp. rate 14, height 5' 10\" (1.778 m), weight 77.1 kg (170 lb), SpO2 97 %.     Intake/Output Summary (Last 24 hours) at 12/19/2019 0901  Last data filed at 12/19/2019 0746  Gross per 24 hour   Intake 0 ml   Output 2650 ml   Net -2650 ml       Physical Exam:  General appearance: alert, cooperative, no distress, appears stated age        Lower Extremity Exam:  Vascular:    Dorsalis Pedis Pulse: present  Posterior Tibialis Pulse: present  Popliteal and Femoral Pulses: present  Skin Temp: warm to warm legs to toes right and left  Extremity Edema:left foot nonpitting edema  Varicosities: absent     Neurological:  Deep Tendon Reflexes of Achilles and Patellar: hypertonia present  Epicritic and Protective Sensations: absent  Deep Pain Response: absent     Dermatological:  Toenails: mycotic nails 1-5 right and left     Ulceration:     right posterior heel ulceration measures 1cm x 1cm x 0.2cm. granular wound bed and hyperkeratotic skin border     Left foot status post open amputation 5th toe and metatarsal. Granular wound bed. No purulence noted. No malodor.  Wound measures 4cm x 3cm x 2cm.        Musculoskeletal:  Gait and Station: non-ambulatory  Muscle Strength of Major Muscle Groups: 4-5 right and left lower extremities  Stability:diminished right and left  Range of Motion:contractures to both lower extremities  Limb Deformities: hammertoe contractures, equinus right and left lower extremities  Previous Amputations: none    Labs:  Lab Results   Component Value Date/Time    WBC 5.6 12/19/2019 05:31 AM    HGB 11.1 (L) 12/19/2019 05:31 AM    HCT 34.4 (L) 12/19/2019 05:31 AM    MCV 93.0 12/19/2019 05:31 AM    PLATELET 088 54/06/5774 05:31 AM     Lab Results   Component Value Date/Time    Sodium 139 12/19/2019 05:31 AM    Potassium 3.8 12/19/2019 05:31 AM    Chloride 107 12/19/2019 05:31 AM    CO2 29 12/19/2019 05:31 AM    BUN 4 (L) 12/19/2019 05:31 AM    Glucose 211 (H) 12/19/2019 05:31 AM     Lab Results   Component Value Date/Time    INR 1.1 10/29/2017 12:18 AM       Current Medications:  Current Facility-Administered Medications   Medication Dose Route Frequency    insulin lispro (HUMALOG) injection 4 Units  4 Units SubCUTAneous TIDAC    hydrALAZINE (APRESOLINE) 20 mg/mL injection 20 mg  20 mg IntraVENous Q6H PRN    sodium hypochlorite (HALF STRENGTH DAKIN'S) 0.25% irrigation (bottle)   Topical DAILY    labetalol (NORMODYNE) tablet 100 mg  100 mg Oral Q12H    dextrose 10% infusion 250 mL  250 mL IntraVENous PRN    FLUoxetine (PROzac) capsule 20 mg  20 mg Oral DAILY    insulin glargine (LANTUS) injection 25 Units  25 Units SubCUTAneous QHS    dantrolene (DANTRIUM) capsule 100 mg  100 mg Oral QID    baclofen (LIORESAL) tablet 40 mg  40 mg Oral TID  amitriptyline (ELAVIL) tablet 25 mg  25 mg Oral QHS    oxybutynin chloride XL (DITROPAN XL) tablet 5 mg  5 mg Oral DAILY    sodium chloride (NS) flush 5-40 mL  5-40 mL IntraVENous Q8H    sodium chloride (NS) flush 5-40 mL  5-40 mL IntraVENous PRN    acetaminophen (TYLENOL) tablet 650 mg  650 mg Oral Q4H PRN    naloxone (NARCAN) injection 0.4 mg  0.4 mg IntraVENous PRN    ondansetron (ZOFRAN) injection 4 mg  4 mg IntraVENous Q4H PRN    HYDROmorphone (DILAUDID) syringe 1 mg  1 mg IntraVENous Q4H PRN    cefepime (MAXIPIME) 2 g in 0.9% sodium chloride (MBP/ADV) 100 mL  2 g IntraVENous Q8H    vancomycin (VANCOCIN) 1,250 mg in 0.9% sodium chloride (MBP/ADV) 250 mL  1,250 mg IntraVENous Q8H    metroNIDAZOLE (FLAGYL) IVPB premix 500 mg  500 mg IntraVENous Q12H    insulin lispro (HUMALOG) injection   SubCUTAneous AC&HS    glucose chewable tablet 16 g  4 Tab Oral PRN    glucagon (GLUCAGEN) injection 1 mg  1 mg IntraMUSCular PRN    dextrose 10% infusion 0-250 mL  0-250 mL IntraVENous PRN    diazePAM (VALIUM) tablet 5 mg  5 mg Oral Q8H PRN    traZODone (DESYREL) tablet 50 mg  50 mg Oral QHS    influenza vaccine 2019-20 (6 mos+)(PF) (FLUARIX/FLULAVAL/FLUZONE QUAD) injection 0.5 mL  0.5 mL IntraMUSCular PRIOR TO DISCHARGE       Impression   Status post left foot open amputation 5th toe and metatarsal   Ulceration right heel with breakdown of skin. Plan:  Saline wet to dry dressing applied to left foot. Dakins ordered. Wound is clean and improving. Plan for possible return to OR on Friday for delayed primary closure of wound    Continued management of right heel ulceration. Continued dry dressing with offloading boot at all times. Following. Ariel Ramos.  GREG Maldonado FACFAS FACCWS [facility]  Triple Board Certified Foot & Ankle Specialist  [phone] cell

## 2019-12-19 NOTE — PROGRESS NOTES
Problem: Pain  Goal: *Control of Pain  Outcome: Progressing Towards Goal     Problem: Patient Education: Go to Patient Education Activity  Goal: Patient/Family Education  Outcome: Progressing Towards Goal     Problem: Infection - Risk of, Surgical Site Infection  Goal: *Absence of surgical site infection signs and symptoms  Outcome: Progressing Towards Goal     Problem: Patient Education: Go to Patient Education Activity  Goal: Patient/Family Education  Outcome: Progressing Towards Goal     Problem: Diabetes Self-Management  Goal: *Disease process and treatment process  Description  Define diabetes and identify own type of diabetes; list 3 options for treating diabetes. Outcome: Progressing Towards Goal  Goal: *Incorporating nutritional management into lifestyle  Description  Describe effect of type, amount and timing of food on blood glucose; list 3 methods for planning meals. Outcome: Progressing Towards Goal  Goal: *Incorporating physical activity into lifestyle  Description  State effect of exercise on blood glucose levels. Outcome: Progressing Towards Goal  Goal: *Developing strategies to promote health/change behavior  Description  Define the ABC's of diabetes; identify appropriate screenings, schedule and personal plan for screenings. Outcome: Progressing Towards Goal  Goal: *Using medications safely  Description  State effect of diabetes medications on diabetes; name diabetes medication taking, action and side effects. Outcome: Progressing Towards Goal  Goal: *Monitoring blood glucose, interpreting and using results  Description  Identify recommended blood glucose targets  and personal targets. Outcome: Progressing Towards Goal  Goal: *Prevention, detection, treatment of acute complications  Description  List symptoms of hyper- and hypoglycemia; describe how to treat low blood sugar and actions for lowering  high blood glucose level.   Outcome: Progressing Towards Goal  Goal: *Prevention, detection and treatment of chronic complications  Description  Define the natural course of diabetes and describe the relationship of blood glucose levels to long term complications of diabetes. Outcome: Progressing Towards Goal  Goal: *Developing strategies to address psychosocial issues  Description  Describe feelings about living with diabetes; identify support needed and support network  Outcome: Progressing Towards Goal  Goal: *Insulin pump training  Outcome: Progressing Towards Goal  Goal: *Sick day guidelines  Outcome: Progressing Towards Goal  Goal: *Patient Specific Goal (EDIT GOAL, INSERT TEXT)  Outcome: Progressing Towards Goal     Problem: Patient Education: Go to Patient Education Activity  Goal: Patient/Family Education  Outcome: Progressing Towards Goal     Problem: Falls - Risk of  Goal: *Absence of Falls  Description  Document Shanika Bryan Fall Risk and appropriate interventions in the flowsheet. Outcome: Progressing Towards Goal  Note: Fall Risk Interventions:  Mobility Interventions: Bed/chair exit alarm         Medication Interventions: Bed/chair exit alarm    Elimination Interventions: Call light in reach    History of Falls Interventions: Bed/chair exit alarm         Problem: Patient Education: Go to Patient Education Activity  Goal: Patient/Family Education  Outcome: Progressing Towards Goal     Problem: Pressure Injury - Risk of  Goal: *Prevention of pressure injury  Description  Document Dhiraj Scale and appropriate interventions in the flowsheet.   Outcome: Progressing Towards Goal  Note: Pressure Injury Interventions:  Sensory Interventions: Float heels    Moisture Interventions: Absorbent underpads    Activity Interventions: Pressure redistribution bed/mattress(bed type)    Mobility Interventions: HOB 30 degrees or less, Float heels    Nutrition Interventions: Document food/fluid/supplement intake    Friction and Shear Interventions: Lift team/patient mobility team                Problem: Patient Education: Go to Patient Education Activity  Goal: Patient/Family Education  Outcome: Progressing Towards Goal

## 2019-12-19 NOTE — PROGRESS NOTES
12/19/2019 11:05 AM Home health order received. Met with pt to discuss. Pt confirmed he would like to use the same agency, [facility]. JUANITA Johnson  Care Management Interventions  PCP Verified by CM: Yes  Mode of Transport at Discharge: Self  Transition of Care Consult (CM Consult): [address]: No  Reason Outside [place]: Patient already serviced by other home care/hospice agency  Physical Therapy Consult: No  Occupational Therapy Consult: No  Speech Therapy Consult: No  Current Support Network:  Other(lives with girlfriend)  The Plan for Transition of Care is Related to the Following Treatment Goals : Osteomylitis   The Patient and/or Patient Representative was Provided with a Choice of Provider and Agrees with the Discharge Plan?: Yes  Name of the Patient Representative Who was Provided with a Choice of Provider and Agrees with the Discharge Plan: Patient   Freedom of Choice List was Provided with Basic Dialogue that Supports the Patient's Individualized Plan of Care/Goals, Treatment Preferences and Shares the Quality Data Associated with the Providers?: Yes

## 2019-12-19 NOTE — PROGRESS NOTES
Problem: Pain  Goal: *Control of Pain  Outcome: Progressing Towards Goal     Problem: Infection - Risk of, Surgical Site Infection  Goal: *Absence of surgical site infection signs and symptoms  Outcome: Progressing Towards Goal     Problem: Diabetes Self-Management  Goal: *Disease process and treatment process  Description  Define diabetes and identify own type of diabetes; list 3 options for treating diabetes. Outcome: Progressing Towards Goal  Goal: *Incorporating nutritional management into lifestyle  Description  Describe effect of type, amount and timing of food on blood glucose; list 3 methods for planning meals. Outcome: Progressing Towards Goal  Goal: *Incorporating physical activity into lifestyle  Description  State effect of exercise on blood glucose levels. Outcome: Progressing Towards Goal  Goal: *Developing strategies to promote health/change behavior  Description  Define the ABC's of diabetes; identify appropriate screenings, schedule and personal plan for screenings. Outcome: Progressing Towards Goal  Goal: *Using medications safely  Description  State effect of diabetes medications on diabetes; name diabetes medication taking, action and side effects. Outcome: Progressing Towards Goal  Goal: *Monitoring blood glucose, interpreting and using results  Description  Identify recommended blood glucose targets  and personal targets. Outcome: Progressing Towards Goal  Goal: *Prevention, detection, treatment of acute complications  Description  List symptoms of hyper- and hypoglycemia; describe how to treat low blood sugar and actions for lowering  high blood glucose level. Outcome: Progressing Towards Goal  Goal: *Prevention, detection and treatment of chronic complications  Description  Define the natural course of diabetes and describe the relationship of blood glucose levels to long term complications of diabetes.   Outcome: Progressing Towards Goal  Goal: *Developing strategies to address psychosocial issues  Description  Describe feelings about living with diabetes; identify support needed and support network  Outcome: Progressing Towards Goal     Problem: Falls - Risk of  Goal: *Absence of Falls  Description  Document Ashwin Garber Fall Risk and appropriate interventions in the flowsheet. Outcome: Progressing Towards Goal  Note: Fall Risk Interventions:  Mobility Interventions: Bed/chair exit alarm, Patient to call before getting OOB         Medication Interventions: Bed/chair exit alarm, Patient to call before getting OOB, Teach patient to arise slowly    Elimination Interventions: Call light in reach, Toileting schedule/hourly rounds    History of Falls Interventions: Bed/chair exit alarm         Problem: Pressure Injury - Risk of  Goal: *Prevention of pressure injury  Description  Document Dhiraj Scale and appropriate interventions in the flowsheet.   Outcome: Progressing Towards Goal  Note: Pressure Injury Interventions:  Sensory Interventions: Avoid rigorous massage over bony prominences, Float heels, Keep linens dry and wrinkle-free    Moisture Interventions: Absorbent underpads, Apply protective barrier, creams and emollients    Activity Interventions: Increase time out of bed, Pressure redistribution bed/mattress(bed type)    Mobility Interventions: HOB 30 degrees or less, Pressure redistribution bed/mattress (bed type)    Nutrition Interventions: Document food/fluid/supplement intake    Friction and Shear Interventions: Apply protective barrier, creams and emollients, HOB 30 degrees or less, Lift sheet

## 2019-12-19 NOTE — PROGRESS NOTES
Patient has a supra-pubic catheter that was incorrectly entered as a bonner catheter. Removed in LDA's and corrected.

## 2019-12-20 ENCOUNTER — ANESTHESIA (OUTPATIENT)
Dept: SURGERY | Age: 39
DRG: 617 | End: 2019-12-20
Payer: COMMERCIAL

## 2019-12-20 LAB
CREAT SERPL-MCNC: 0.7 MG/DL (ref 0.7–1.3)
GLUCOSE BLD STRIP.AUTO-MCNC: 144 MG/DL (ref 65–100)
GLUCOSE BLD STRIP.AUTO-MCNC: 145 MG/DL (ref 65–100)
GLUCOSE BLD STRIP.AUTO-MCNC: 150 MG/DL (ref 65–100)
GLUCOSE BLD STRIP.AUTO-MCNC: 285 MG/DL (ref 65–100)
SERVICE CMNT-IMP: ABNORMAL

## 2019-12-20 PROCEDURE — 74011636637 HC RX REV CODE- 636/637: Performed by: PODIATRIST

## 2019-12-20 PROCEDURE — 36415 COLL VENOUS BLD VENIPUNCTURE: CPT

## 2019-12-20 PROCEDURE — 77030040922 HC BLNKT HYPOTHRM STRY -A

## 2019-12-20 PROCEDURE — 94760 N-INVAS EAR/PLS OXIMETRY 1: CPT

## 2019-12-20 PROCEDURE — 74011000250 HC RX REV CODE- 250: Performed by: NURSE ANESTHETIST, CERTIFIED REGISTERED

## 2019-12-20 PROCEDURE — 77030028224 HC PDNG CST BSNM -A: Performed by: PODIATRIST

## 2019-12-20 PROCEDURE — 77030011640 HC PAD GRND REM COVD -A: Performed by: PODIATRIST

## 2019-12-20 PROCEDURE — 77030002916 HC SUT ETHLN J&J -A: Performed by: PODIATRIST

## 2019-12-20 PROCEDURE — 82565 ASSAY OF CREATININE: CPT

## 2019-12-20 PROCEDURE — 77030039497 HC CST PAD STERILE CHCS -A: Performed by: PODIATRIST

## 2019-12-20 PROCEDURE — 76030000002 HC AMB SURG OR TIME FIRST 0.: Performed by: PODIATRIST

## 2019-12-20 PROCEDURE — 77030031139 HC SUT VCRL2 J&J -A: Performed by: PODIATRIST

## 2019-12-20 PROCEDURE — 74011250636 HC RX REV CODE- 250/636: Performed by: NURSE ANESTHETIST, CERTIFIED REGISTERED

## 2019-12-20 PROCEDURE — 74011000258 HC RX REV CODE- 258: Performed by: PODIATRIST

## 2019-12-20 PROCEDURE — 76060000073 HC AMB SURG ANES FIRST 0.5 HR: Performed by: PODIATRIST

## 2019-12-20 PROCEDURE — 74011250637 HC RX REV CODE- 250/637: Performed by: INTERNAL MEDICINE

## 2019-12-20 PROCEDURE — 74011250636 HC RX REV CODE- 250/636: Performed by: PODIATRIST

## 2019-12-20 PROCEDURE — 82962 GLUCOSE BLOOD TEST: CPT

## 2019-12-20 PROCEDURE — 77030000032 HC CUF TRNQT ZIMM -B: Performed by: PODIATRIST

## 2019-12-20 PROCEDURE — 74011250637 HC RX REV CODE- 250/637: Performed by: PODIATRIST

## 2019-12-20 PROCEDURE — 76210000034 HC AMBSU PH I REC 0.5 TO 1 HR: Performed by: PODIATRIST

## 2019-12-20 PROCEDURE — 0HQNXZZ REPAIR LEFT FOOT SKIN, EXTERNAL APPROACH: ICD-10-PCS | Performed by: PODIATRIST

## 2019-12-20 PROCEDURE — 74011250636 HC RX REV CODE- 250/636: Performed by: ANESTHESIOLOGY

## 2019-12-20 PROCEDURE — 74011000250 HC RX REV CODE- 250: Performed by: PODIATRIST

## 2019-12-20 PROCEDURE — 74011250636 HC RX REV CODE- 250/636: Performed by: STUDENT IN AN ORGANIZED HEALTH CARE EDUCATION/TRAINING PROGRAM

## 2019-12-20 PROCEDURE — 65270000029 HC RM PRIVATE

## 2019-12-20 PROCEDURE — 77030040361 HC SLV COMPR DVT MDII -B

## 2019-12-20 RX ORDER — LIDOCAINE HYDROCHLORIDE 20 MG/ML
INJECTION, SOLUTION INFILTRATION; PERINEURAL AS NEEDED
Status: DISCONTINUED | OUTPATIENT
Start: 2019-12-20 | End: 2019-12-20 | Stop reason: HOSPADM

## 2019-12-20 RX ORDER — SODIUM CHLORIDE, SODIUM LACTATE, POTASSIUM CHLORIDE, CALCIUM CHLORIDE 600; 310; 30; 20 MG/100ML; MG/100ML; MG/100ML; MG/100ML
125 INJECTION, SOLUTION INTRAVENOUS CONTINUOUS
Status: DISCONTINUED | OUTPATIENT
Start: 2019-12-20 | End: 2019-12-20 | Stop reason: HOSPADM

## 2019-12-20 RX ORDER — ONDANSETRON 2 MG/ML
INJECTION INTRAMUSCULAR; INTRAVENOUS AS NEEDED
Status: DISCONTINUED | OUTPATIENT
Start: 2019-12-20 | End: 2019-12-20 | Stop reason: HOSPADM

## 2019-12-20 RX ORDER — FENTANYL CITRATE 50 UG/ML
25 INJECTION, SOLUTION INTRAMUSCULAR; INTRAVENOUS
Status: DISCONTINUED | OUTPATIENT
Start: 2019-12-20 | End: 2019-12-20 | Stop reason: HOSPADM

## 2019-12-20 RX ORDER — MIDAZOLAM HYDROCHLORIDE 1 MG/ML
INJECTION, SOLUTION INTRAMUSCULAR; INTRAVENOUS AS NEEDED
Status: DISCONTINUED | OUTPATIENT
Start: 2019-12-20 | End: 2019-12-20 | Stop reason: HOSPADM

## 2019-12-20 RX ORDER — HYDROMORPHONE HYDROCHLORIDE 1 MG/ML
.5-1 INJECTION, SOLUTION INTRAMUSCULAR; INTRAVENOUS; SUBCUTANEOUS
Status: DISCONTINUED | OUTPATIENT
Start: 2019-12-20 | End: 2019-12-20 | Stop reason: HOSPADM

## 2019-12-20 RX ORDER — ONDANSETRON 2 MG/ML
4 INJECTION INTRAMUSCULAR; INTRAVENOUS AS NEEDED
Status: DISCONTINUED | OUTPATIENT
Start: 2019-12-20 | End: 2019-12-20 | Stop reason: HOSPADM

## 2019-12-20 RX ORDER — PROPOFOL 10 MG/ML
INJECTION, EMULSION INTRAVENOUS
Status: DISCONTINUED | OUTPATIENT
Start: 2019-12-20 | End: 2019-12-20 | Stop reason: HOSPADM

## 2019-12-20 RX ORDER — LABETALOL 100 MG/1
200 TABLET, FILM COATED ORAL EVERY 12 HOURS
Status: DISCONTINUED | OUTPATIENT
Start: 2019-12-20 | End: 2019-12-21 | Stop reason: HOSPADM

## 2019-12-20 RX ORDER — PROPOFOL 10 MG/ML
INJECTION, EMULSION INTRAVENOUS AS NEEDED
Status: DISCONTINUED | OUTPATIENT
Start: 2019-12-20 | End: 2019-12-20 | Stop reason: HOSPADM

## 2019-12-20 RX ORDER — HYDRALAZINE HYDROCHLORIDE 20 MG/ML
20 INJECTION INTRAMUSCULAR; INTRAVENOUS ONCE
Status: COMPLETED | OUTPATIENT
Start: 2019-12-20 | End: 2019-12-20

## 2019-12-20 RX ORDER — KETOROLAC TROMETHAMINE 30 MG/ML
15 INJECTION, SOLUTION INTRAMUSCULAR; INTRAVENOUS
Status: DISCONTINUED | OUTPATIENT
Start: 2019-12-20 | End: 2019-12-20 | Stop reason: HOSPADM

## 2019-12-20 RX ORDER — FENTANYL CITRATE 50 UG/ML
INJECTION, SOLUTION INTRAMUSCULAR; INTRAVENOUS AS NEEDED
Status: DISCONTINUED | OUTPATIENT
Start: 2019-12-20 | End: 2019-12-20 | Stop reason: HOSPADM

## 2019-12-20 RX ORDER — BUPIVACAINE HYDROCHLORIDE 5 MG/ML
INJECTION, SOLUTION EPIDURAL; INTRACAUDAL AS NEEDED
Status: DISCONTINUED | OUTPATIENT
Start: 2019-12-20 | End: 2019-12-20 | Stop reason: HOSPADM

## 2019-12-20 RX ORDER — OXYCODONE HYDROCHLORIDE 5 MG/1
10 TABLET ORAL
Status: DISCONTINUED | OUTPATIENT
Start: 2019-12-20 | End: 2019-12-20 | Stop reason: HOSPADM

## 2019-12-20 RX ORDER — LIDOCAINE HYDROCHLORIDE 10 MG/ML
0.1 INJECTION, SOLUTION EPIDURAL; INFILTRATION; INTRACAUDAL; PERINEURAL AS NEEDED
Status: DISCONTINUED | OUTPATIENT
Start: 2019-12-20 | End: 2019-12-20 | Stop reason: HOSPADM

## 2019-12-20 RX ADMIN — PROPOFOL 75 MCG/KG/MIN: 10 INJECTION, EMULSION INTRAVENOUS at 16:09

## 2019-12-20 RX ADMIN — VANCOMYCIN HYDROCHLORIDE 1250 MG: 1.25 INJECTION, POWDER, LYOPHILIZED, FOR SOLUTION INTRAVENOUS at 06:05

## 2019-12-20 RX ADMIN — LIDOCAINE HYDROCHLORIDE 60 MG: 20 INJECTION, SOLUTION INFILTRATION; PERINEURAL at 16:10

## 2019-12-20 RX ADMIN — INSULIN GLARGINE 25 UNITS: 100 INJECTION, SOLUTION SUBCUTANEOUS at 21:41

## 2019-12-20 RX ADMIN — FENTANYL CITRATE 25 MCG: 50 INJECTION INTRAMUSCULAR; INTRAVENOUS at 16:05

## 2019-12-20 RX ADMIN — SODIUM CHLORIDE, POTASSIUM CHLORIDE, SODIUM LACTATE AND CALCIUM CHLORIDE: 600; 310; 30; 20 INJECTION, SOLUTION INTRAVENOUS at 15:30

## 2019-12-20 RX ADMIN — BACLOFEN 40 MG: 10 TABLET ORAL at 06:05

## 2019-12-20 RX ADMIN — OXYBUTYNIN CHLORIDE 5 MG: 5 TABLET, EXTENDED RELEASE ORAL at 08:24

## 2019-12-20 RX ADMIN — ONDANSETRON 4 MG: 2 INJECTION INTRAMUSCULAR; INTRAVENOUS at 03:08

## 2019-12-20 RX ADMIN — ONDANSETRON 4 MG: 2 INJECTION INTRAMUSCULAR; INTRAVENOUS at 16:10

## 2019-12-20 RX ADMIN — BACLOFEN 40 MG: 10 TABLET ORAL at 17:44

## 2019-12-20 RX ADMIN — DANTROLENE SODIUM 100 MG: 25 CAPSULE ORAL at 11:21

## 2019-12-20 RX ADMIN — CEFEPIME 2 G: 20 INJECTION, POWDER, FOR SOLUTION INTRAVENOUS at 01:58

## 2019-12-20 RX ADMIN — INSULIN LISPRO 3 UNITS: 100 INJECTION, SOLUTION INTRAVENOUS; SUBCUTANEOUS at 21:42

## 2019-12-20 RX ADMIN — Medication 10 ML: at 17:39

## 2019-12-20 RX ADMIN — FENTANYL CITRATE 25 MCG: 50 INJECTION INTRAMUSCULAR; INTRAVENOUS at 16:08

## 2019-12-20 RX ADMIN — DANTROLENE SODIUM 100 MG: 25 CAPSULE ORAL at 17:43

## 2019-12-20 RX ADMIN — MIDAZOLAM 1 MG: 1 INJECTION INTRAMUSCULAR; INTRAVENOUS at 16:07

## 2019-12-20 RX ADMIN — CEFEPIME 2 G: 20 INJECTION, POWDER, FOR SOLUTION INTRAVENOUS at 23:35

## 2019-12-20 RX ADMIN — AMITRIPTYLINE HYDROCHLORIDE 25 MG: 50 TABLET, FILM COATED ORAL at 21:40

## 2019-12-20 RX ADMIN — METRONIDAZOLE 500 MG: 500 INJECTION, SOLUTION INTRAVENOUS at 03:09

## 2019-12-20 RX ADMIN — LABETALOL HYDROCHLORIDE 200 MG: 100 TABLET, FILM COATED ORAL at 08:24

## 2019-12-20 RX ADMIN — HYDROMORPHONE HYDROCHLORIDE 1 MG: 1 INJECTION, SOLUTION INTRAMUSCULAR; INTRAVENOUS; SUBCUTANEOUS at 21:59

## 2019-12-20 RX ADMIN — BACLOFEN 40 MG: 10 TABLET ORAL at 11:21

## 2019-12-20 RX ADMIN — VANCOMYCIN HYDROCHLORIDE 1250 MG: 1.25 INJECTION, POWDER, LYOPHILIZED, FOR SOLUTION INTRAVENOUS at 17:38

## 2019-12-20 RX ADMIN — MIDAZOLAM 1 MG: 1 INJECTION INTRAMUSCULAR; INTRAVENOUS at 16:05

## 2019-12-20 RX ADMIN — LABETALOL HYDROCHLORIDE 200 MG: 100 TABLET, FILM COATED ORAL at 21:40

## 2019-12-20 RX ADMIN — Medication 10 ML: at 06:06

## 2019-12-20 RX ADMIN — PROPOFOL 20 MG: 10 INJECTION, EMULSION INTRAVENOUS at 16:09

## 2019-12-20 RX ADMIN — FLUOXETINE HYDROCHLORIDE 20 MG: 20 CAPSULE ORAL at 06:05

## 2019-12-20 RX ADMIN — DANTROLENE SODIUM 100 MG: 25 CAPSULE ORAL at 21:39

## 2019-12-20 RX ADMIN — Medication 10 ML: at 21:42

## 2019-12-20 RX ADMIN — CEFEPIME 2 G: 20 INJECTION, POWDER, FOR SOLUTION INTRAVENOUS at 11:21

## 2019-12-20 RX ADMIN — HYDRALAZINE HYDROCHLORIDE 20 MG: 20 INJECTION INTRAMUSCULAR; INTRAVENOUS at 15:10

## 2019-12-20 RX ADMIN — METRONIDAZOLE 500 MG: 500 INJECTION, SOLUTION INTRAVENOUS at 21:42

## 2019-12-20 RX ADMIN — HYOSCYAMINE SULFATE: 16 SOLUTION at 08:26

## 2019-12-20 RX ADMIN — DIAZEPAM 5 MG: 5 TABLET ORAL at 06:05

## 2019-12-20 RX ADMIN — TRAZODONE HYDROCHLORIDE 50 MG: 50 TABLET ORAL at 21:41

## 2019-12-20 RX ADMIN — DANTROLENE SODIUM 100 MG: 25 CAPSULE ORAL at 06:05

## 2019-12-20 NOTE — BRIEF OP NOTE
BRIEF OPERATIVE NOTE    Date of Procedure: 12/20/2019   Preoperative Diagnosis: OPEN WOUND LEFT FOOT  Postoperative Diagnosis: OPEN WOUND LEFT FOOT    Procedure(s):  DELAYED PRIMARY CLOSURE LEFT FOOT  Surgeon(s) and Role:     * Nelly Maldonado DPM - Primary         Surgical Assistant: Cailin Sanchez    Surgical Staff:  Circ-1: Latisha Diehl RN  Scrub Tech-1: Brian Schroeder  Surg Asst-1: [address], [address] Staff: Percival Bosworth, RN  Event Time In Time Out   Incision Start 12/20/2019 1620    Incision Close 12/20/2019 1631      Anesthesia: MAC   Estimated Blood Loss: 5mL  Specimens: * No specimens in log *   Findings: granular open wound measures 4.5cm x 8.0RR x 1cm  Complications: none  Implants: * No implants in log *

## 2019-12-20 NOTE — PROGRESS NOTES
Problem: Pain  Goal: *Control of Pain  Outcome: Progressing Towards Goal     Problem: Infection - Risk of, Surgical Site Infection  Goal: *Absence of surgical site infection signs and symptoms  Outcome: Progressing Towards Goal

## 2019-12-20 NOTE — ANESTHESIA POSTPROCEDURE EVALUATION
Procedure(s):  DELAYED PRIMARY CLOSURE LEFT FOOT. MAC    Anesthesia Post Evaluation      Multimodal analgesia: multimodal analgesia not used between 6 hours prior to anesthesia start to PACU discharge  Patient location during evaluation: PACU  Patient participation: complete - patient participated  Level of consciousness: awake and alert  Pain score: 0  Pain management: satisfactory to patient  Airway patency: patent  Anesthetic complications: no  Cardiovascular status: acceptable  Respiratory status: acceptable  Hydration status: acceptable  Post anesthesia nausea and vomiting:  none      Vitals Value Taken Time   /59 12/20/2019  5:00 PM   Temp 36.6 °C (97.8 °F) 12/20/2019  4:39 PM   Pulse 74 12/20/2019  5:04 PM   Resp 13 12/20/2019  5:04 PM   SpO2 99 % 12/20/2019  5:04 PM   Vitals shown include unvalidated device data.

## 2019-12-20 NOTE — PERIOP NOTES
TRANSFER - OUT REPORT:    Verbal report given to Elizabeth RN(name) on Author Rios  being transferred to room 424(unit) for routine post - op       Report consisted of patients Situation, Background, Assessment and   Recommendations(SBAR). Information from the following report(s) Procedure Summary and Intake/Output was reviewed with the receiving nurse. Lines:   Peripheral IV 12/19/19 Posterior;Right Hand (Active)   Site Assessment Clean, dry, & intact 12/20/2019  5:07 PM   Phlebitis Assessment 0 12/20/2019  5:07 PM   Infiltration Assessment 0 12/20/2019  5:07 PM   Dressing Status Clean, dry, & intact 12/20/2019  5:07 PM   Dressing Type Tape;Transparent 12/20/2019  5:07 PM   Hub Color/Line Status Blue; Infusing 12/20/2019  5:07 PM   Action Taken Open ports on tubing capped 12/20/2019  8:26 AM   Alcohol Cap Used Yes 12/20/2019  5:07 PM        Opportunity for questions and clarification was provided. Patient transported with:   Registered Nurse Phone report given to RN. Patient resting comfortably.

## 2019-12-20 NOTE — OP NOTES
Operative Report    Patient: Renate Jett MRN: [id number]  SSN: [id number]    YOB: 1980  Age: 35 y.o. Sex: male       Date of Surgery: 12/20/2019     Preoperative Diagnosis: OPEN WOUND LEFT FOOT     Postoperative Diagnosis: OPEN WOUND LEFT FOOT     Surgeon(s) and Role:     * Tj Maldonado DPM - Primary    Anesthesia: MAC     Procedure: Procedure(s):  DELAYED PRIMARY CLOSURE LEFT FOOT       Procedure in Detail: This 54-year-old male was brought to the operating room and placed supine on the operating room table. After adequate IV sedation, a local infiltrated block was performed to the left foot. The left foot was then prepped and draped in usual aseptic fashion. Next attention was directed to the left lateral forefoot where there was a previous amputation of the fifth toe and metatarsal. There is no purulence noted and there is healthy granulation tissue filling the wound. The wound measures 4 cm x 2 cm x 2 cm. Many devitalized tissue is sharply removed. The wound was then irrigated with copious amounts of normal saline. The exposed ligaments and tendons are noted to be healthy. The skin edges were then advanced and reapproximated using 3-0 nylon. Patient tolerated procedure and anesthesia well and a postoperative dressing was applied. Patient was then transferred to the PACU for postoperative monitoring. He will then be transferred to the floor for further medical and surgical management. Estimated Blood Loss:  1mL    Tourniquet Time: * No tourniquets in log *      Implants: * No implants in log *            Specimens: * No specimens in log *        Drains: None                Complications: None    Counts: Sponge and needle counts were correct times two.     Signed By:  Kathy Ivey DPM     December 20, 2019

## 2019-12-20 NOTE — PROGRESS NOTES
TRANSFER - OUT REPORT:    Verbal report given to [facility], RN (name) on Marcos Trevino  being transferred to Pre-OP (unit) for ordered procedure       Report consisted of patients Situation, Background, Assessment and   Recommendations(SBAR). Information from the following report(s) SBAR, Kardex, ED Summary and Recent Results was reviewed with the receiving nurse. Lines:   Peripheral IV 12/19/19 Posterior;Right Hand (Active)   Site Assessment Clean, dry, & intact 12/20/2019  8:26 AM   Phlebitis Assessment 0 12/20/2019  8:26 AM   Infiltration Assessment 0 12/20/2019  8:26 AM   Dressing Status Clean, dry, & intact 12/20/2019  8:26 AM   Dressing Type Transparent 12/20/2019  8:26 AM   Hub Color/Line Status Capped 12/20/2019  8:26 AM   Action Taken Open ports on tubing capped 12/20/2019  8:26 AM   Alcohol Cap Used Yes 12/20/2019  8:26 AM        Opportunity for questions and clarification was provided. Lyndon Harris IN REPORT:    Verbal report received from [facility], RN (name) on Marcos Trevino  being received from [facility] PACU (unit) for routine post - op      Report consisted of patients Situation, Background, Assessment and   Recommendations(SBAR). Information from the following report(s) OR Summary and Procedure Summary was reviewed with the receiving nurse. Opportunity for questions and clarification was provided. Assessment completed upon patients arrival to unit and care assumed.

## 2019-12-20 NOTE — PROGRESS NOTES
Notified Dr Slava Means of patient's pre-op blood pressure of 195/115, recheck 167/115. Received verbal order to administer 20mg of IV Hydralazine now.

## 2019-12-20 NOTE — PROGRESS NOTES
12/20/2019 9:51 AM Pt to return to OR for wound closure by podiatry. CM will follow. JUANITA Huddleston    Discharge plan:  Home health RN for wound care through [facility]. Pt's family to transport pt at discharge.

## 2019-12-20 NOTE — ANESTHESIA PREPROCEDURE EVALUATION
Relevant Problems   No relevant active problems     Relevant Problems   No relevant active problems       Anesthetic History     PONV   Relevant Problems   No relevant active problems       Anesthetic History     PONV          Review of Systems / Medical History  Patient summary reviewed and pertinent labs reviewed    Pulmonary          Smoker      Comments: Spinal cord injury  Quad with arm movement. Neuro/Psych   Within defined limits           Cardiovascular    Hypertension                   GI/Hepatic/Renal  Within defined limits              Endo/Other    Diabetes         Other Findings              Physical Exam    Airway  Mallampati: II  TM Distance: 4 - 6 cm  Neck ROM: normal range of motion   Mouth opening: Normal     Cardiovascular  Regular rate and rhythm,  S1 and S2 normal,  no murmur, click, rub, or gallop  Rhythm: regular  Rate: normal         Dental  No notable dental hx       Pulmonary  Breath sounds clear to auscultation               Abdominal  GI exam deferred       Other Findings            Anesthetic Plan    ASA: 3  Anesthesia type: MAC          Induction: Intravenous  Anesthetic plan and risks discussed with: Patient                   Review of Systems / Medical History  Patient summary reviewed and pertinent labs reviewed    Pulmonary          Smoker      Comments: Spinal cord injury  Quad with arm movement.    Neuro/Psych   Within defined limits           Cardiovascular    Hypertension                   GI/Hepatic/Renal  Within defined limits              Endo/Other    Diabetes         Other Findings              Physical Exam    Airway  Mallampati: II  TM Distance: 4 - 6 cm  Neck ROM: normal range of motion   Mouth opening: Normal     Cardiovascular  Regular rate and rhythm,  S1 and S2 normal,  no murmur, click, rub, or gallop  Rhythm: regular  Rate: normal         Dental  No notable dental hx       Pulmonary  Breath sounds clear to auscultation               Abdominal  GI exam deferred       Other Findings            Anesthetic Plan    ASA: 3  Anesthesia type: MAC          Induction: Intravenous  Anesthetic plan and risks discussed with: Patient              Anesthetic History   No history of anesthetic complications            Review of Systems / Medical History  Patient summary reviewed, nursing notes reviewed and pertinent labs reviewed    Pulmonary          Smoker         Neuro/Psych   Within defined limits          Comments: Thoracic level quadriplegia Cardiovascular    Hypertension                   GI/Hepatic/Renal  Within defined limits              Endo/Other    Diabetes         Other Findings              Physical Exam    Airway  Mallampati: II  TM Distance: 4 - 6 cm  Neck ROM: normal range of motion   Mouth opening: Normal     Cardiovascular    Rhythm: regular  Rate: normal         Dental    Dentition: Lower dentition intact and Upper dentition intact     Pulmonary  Breath sounds clear to auscultation               Abdominal         Other Findings            Anesthetic Plan    ASA: 3  Anesthesia type: MAC          Induction: Intravenous  Anesthetic plan and risks discussed with: Patient

## 2019-12-20 NOTE — PROGRESS NOTES
The [address] [place] [address] Podiatric Surgery  Progress Note  Subjective:  Ila Perez following for left foot status post 5th ray amputation and right heel ulceration. Afebrile. No pain. ROS:   Constitutional: Negative for fever, chills, weight loss and malaise/fatigue. HENT: Negative for nosebleeds and congestion. Eyes: Negative for blurred vision and double vision. Respiratory: Negative for cough, shortness of breath and wheezing. Cardiovascular: Negative for chest pain, palpitations, claudication. Positive for leg swelling  Gastrointestinal: Negative for heartburn, nausea, vomiting, abdominal pain and diarrhea. Genitourinary: suprapubic catheter  Musculoskeletal: Negative for myalgias and joint pain. Positive for contractures  Skin: Negative for itching and rash. Positive for ulcerations  Neurological: Negative for dizziness, focal weakness and headaches. Endo/Heme/Allergies: Negative for environmental allergies. Does not bruise/bleed easily. Psychiatric/Behavioral: Negative for depression and suicidal ideas. The patient is not nervous/anxious. Objective:  Blood pressure (!) 176/107, pulse 77, temperature 98.4 °F (36.9 °C), resp. rate 16, height 5' 10\" (1.778 m), weight 77.1 kg (170 lb), SpO2 99 %.     Intake/Output Summary (Last 24 hours) at 12/20/2019 0603  Last data filed at 12/20/2019 0008  Gross per 24 hour   Intake 0 ml   Output 3550 ml   Net -3550 ml       Physical Exam:  General appearance: alert, cooperative, no distress, appears stated age        Lower Extremity Exam:  Vascular:    Dorsalis Pedis Pulse: present  Posterior Tibialis Pulse: present  Popliteal and Femoral Pulses: present  Skin Temp: warm to warm legs to toes right and left  Extremity Edema:left foot nonpitting edema  Varicosities: absent     Neurological:  Deep Tendon Reflexes of Achilles and Patellar: hypertonia present  Epicritic and Protective Sensations: absent  Deep Pain Response: absent     Dermatological:  Toenails: mycotic nails 1-5 right and left     Ulceration:     right posterior heel ulceration measures 1cm x 1cm x 0.2cm. granular wound bed and hyperkeratotic skin border     Left foot status post open amputation 5th toe and metatarsal. Granular wound bed. No purulence noted. No malodor.  Wound measures 4cm x 3cm x 2cm.        Musculoskeletal:  Gait and Station: non-ambulatory  Muscle Strength of Major Muscle Groups: 4-5 right and left lower extremities  Stability:diminished right and left  Range of Motion:contractures to both lower extremities  Limb Deformities: hammertoe contractures, equinus right and left lower extremities  Previous Amputations: none    Labs:  Lab Results   Component Value Date/Time    WBC 5.6 12/19/2019 05:31 AM    HGB 11.1 (L) 12/19/2019 05:31 AM    HCT 34.4 (L) 12/19/2019 05:31 AM    MCV 93.0 12/19/2019 05:31 AM    PLATELET 638 56/20/8821 05:31 AM     Lab Results   Component Value Date/Time    Sodium 139 12/19/2019 05:31 AM    Potassium 3.8 12/19/2019 05:31 AM    Chloride 107 12/19/2019 05:31 AM    CO2 29 12/19/2019 05:31 AM    BUN 4 (L) 12/19/2019 05:31 AM    Glucose 211 (H) 12/19/2019 05:31 AM     Lab Results   Component Value Date/Time    INR 1.1 10/29/2017 12:18 AM       Current Medications:  Current Facility-Administered Medications   Medication Dose Route Frequency    insulin lispro (HUMALOG) injection 4 Units  4 Units SubCUTAneous TIDAC    hydrALAZINE (APRESOLINE) 20 mg/mL injection 20 mg  20 mg IntraVENous Q6H PRN    enoxaparin (LOVENOX) injection 40 mg  40 mg SubCUTAneous Q24H    sodium hypochlorite (HALF STRENGTH DAKIN'S) 0.25% irrigation (bottle)   Topical DAILY    labetalol (NORMODYNE) tablet 100 mg  100 mg Oral Q12H    dextrose 10% infusion 250 mL  250 mL IntraVENous PRN    FLUoxetine (PROzac) capsule 20 mg  20 mg Oral DAILY    insulin glargine (LANTUS) injection 25 Units  25 Units SubCUTAneous QHS    dantrolene (DANTRIUM) capsule 100 mg  100 mg Oral QID    baclofen (LIORESAL) tablet 40 mg  40 mg Oral TID    amitriptyline (ELAVIL) tablet 25 mg  25 mg Oral QHS    oxybutynin chloride XL (DITROPAN XL) tablet 5 mg  5 mg Oral DAILY    sodium chloride (NS) flush 5-40 mL  5-40 mL IntraVENous Q8H    sodium chloride (NS) flush 5-40 mL  5-40 mL IntraVENous PRN    acetaminophen (TYLENOL) tablet 650 mg  650 mg Oral Q4H PRN    naloxone (NARCAN) injection 0.4 mg  0.4 mg IntraVENous PRN    ondansetron (ZOFRAN) injection 4 mg  4 mg IntraVENous Q4H PRN    HYDROmorphone (DILAUDID) syringe 1 mg  1 mg IntraVENous Q4H PRN    cefepime (MAXIPIME) 2 g in 0.9% sodium chloride (MBP/ADV) 100 mL  2 g IntraVENous Q8H    vancomycin (VANCOCIN) 1,250 mg in 0.9% sodium chloride (MBP/ADV) 250 mL  1,250 mg IntraVENous Q8H    metroNIDAZOLE (FLAGYL) IVPB premix 500 mg  500 mg IntraVENous Q12H    insulin lispro (HUMALOG) injection   SubCUTAneous AC&HS    glucose chewable tablet 16 g  4 Tab Oral PRN    glucagon (GLUCAGEN) injection 1 mg  1 mg IntraMUSCular PRN    dextrose 10% infusion 0-250 mL  0-250 mL IntraVENous PRN    diazePAM (VALIUM) tablet 5 mg  5 mg Oral Q8H PRN    traZODone (DESYREL) tablet 50 mg  50 mg Oral QHS    influenza vaccine 2019-20 (6 mos+)(PF) (FLUARIX/FLULAVAL/FLUZONE QUAD) injection 0.5 mL  0.5 mL IntraMUSCular PRIOR TO DISCHARGE       Impression   Status post left foot open amputation 5th toe and metatarsal   Ulceration right heel with breakdown of skin. Plan:  Saline wet to dry dressing applied to left foot. Dakins ordered. Wound is clean and improving. Return to OR tomorrow afternoon for repeat washout and delayed primary closure. NPO and consent ordered. Continued management of right heel ulceration. Continued dry dressing with offloading boot at all times. Following. Norah Walls.  Pica, DPM FACFAS FACCWS [facility]  Triple Board Certified Foot & Ankle Specialist  [phone] cell

## 2019-12-20 NOTE — PROGRESS NOTES
Moiz Cerna [facility] [address]  [address]  [phone]      Medical Progress Note      NAME: Oc Frye   :  1980  MRM:  [id number]    Date/Time: 2019         Subjective:     Chief Complaint:  Patient was seen and examined by me. Chart reviewed. No issues overnight. Some foot pain. Awaiting wound closure       Objective:       Vitals:       Last 24hrs VS reviewed since prior progress note. Most recent are:    Visit Vitals  BP (!) 173/111 (BP 1 Location: Right arm, BP Patient Position: At rest;Head of bed elevated (Comment degrees))   Pulse 80   Temp 97.7 °F (36.5 °C)   Resp 17   Ht 5' 10\" (1.778 m)   Wt 77.1 kg (170 lb)   SpO2 99%   BMI 24.39 kg/m²     SpO2 Readings from Last 6 Encounters:   19 99%   19 100%   19 99%   19 99%   18 100%   18 99%            Intake/Output Summary (Last 24 hours) at 2019 1037  Last data filed at 2019 0008  Gross per 24 hour   Intake --   Output 1250 ml   Net -1250 ml        Exam:     Physical Exam:    Gen:  Well-developed, well-nourished, in no acute distress  HEENT:  Pink conjunctivae, PERRL, hearing intact to voice, moist mucous membranes  Neck:  Supple, without masses, thyroid non-tender  Resp:  No accessory muscle use, clear breath sounds without wheezes rales or rhonchi  Card:  No murmurs, normal S1, S2 without thrills, bruits or peripheral edema  Abd:  Soft, non-tender, non-distended, normoactive bowel sounds are present  Musc:  No cyanosis or clubbing  Skin:  L foot wound c/d/i  Neuro:  Cranial nerves 3-12 are grossly intact, able to move BUE. Unable to move BLE.   Has suprapubic cath  Psych:  Good insight, oriented to person, place and time, alert    Medications Reviewed: (see below)    Lab Data Reviewed: (see below)    ______________________________________________________________________    Medications:     Current Facility-Administered Medications   Medication Dose Route Frequency    labetalol (NORMODYNE) tablet 200 mg  200 mg Oral Q12H    insulin lispro (HUMALOG) injection 4 Units  4 Units SubCUTAneous TIDAC    hydrALAZINE (APRESOLINE) 20 mg/mL injection 20 mg  20 mg IntraVENous Q6H PRN    enoxaparin (LOVENOX) injection 40 mg  40 mg SubCUTAneous Q24H    sodium hypochlorite (HALF STRENGTH DAKIN'S) 0.25% irrigation (bottle)   Topical DAILY    dextrose 10% infusion 250 mL  250 mL IntraVENous PRN    FLUoxetine (PROzac) capsule 20 mg  20 mg Oral DAILY    insulin glargine (LANTUS) injection 25 Units  25 Units SubCUTAneous QHS    dantrolene (DANTRIUM) capsule 100 mg  100 mg Oral QID    baclofen (LIORESAL) tablet 40 mg  40 mg Oral TID    amitriptyline (ELAVIL) tablet 25 mg  25 mg Oral QHS    oxybutynin chloride XL (DITROPAN XL) tablet 5 mg  5 mg Oral DAILY    sodium chloride (NS) flush 5-40 mL  5-40 mL IntraVENous Q8H    sodium chloride (NS) flush 5-40 mL  5-40 mL IntraVENous PRN    acetaminophen (TYLENOL) tablet 650 mg  650 mg Oral Q4H PRN    naloxone (NARCAN) injection 0.4 mg  0.4 mg IntraVENous PRN    ondansetron (ZOFRAN) injection 4 mg  4 mg IntraVENous Q4H PRN    HYDROmorphone (DILAUDID) syringe 1 mg  1 mg IntraVENous Q4H PRN    cefepime (MAXIPIME) 2 g in 0.9% sodium chloride (MBP/ADV) 100 mL  2 g IntraVENous Q8H    vancomycin (VANCOCIN) 1,250 mg in 0.9% sodium chloride (MBP/ADV) 250 mL  1,250 mg IntraVENous Q8H    metroNIDAZOLE (FLAGYL) IVPB premix 500 mg  500 mg IntraVENous Q12H    insulin lispro (HUMALOG) injection   SubCUTAneous AC&HS    glucose chewable tablet 16 g  4 Tab Oral PRN    glucagon (GLUCAGEN) injection 1 mg  1 mg IntraMUSCular PRN    dextrose 10% infusion 0-250 mL  0-250 mL IntraVENous PRN    diazePAM (VALIUM) tablet 5 mg  5 mg Oral Q8H PRN    traZODone (DESYREL) tablet 50 mg  50 mg Oral QHS    influenza vaccine 2019-20 (6 mos+)(PF) (FLUARIX/FLULAVAL/FLUZONE QUAD) injection 0.5 mL  0.5 mL IntraMUSCular PRIOR TO DISCHARGE          Lab Review:     Recent Labs     12/19/19 0531   WBC 5.6   HGB 11.1*   HCT 34.4*        Recent Labs     12/20/19  0314 12/19/19  0531 12/18/19  0235   NA  --  139  --    K  --  3.8  --    CL  --  107  --    CO2  --  29  --    GLU  --  211*  --    BUN  --  4*  --    CREA 0.70 0.71 0.81   CA  --  7.7*  --    MG  --  1.9  --    PHOS  --  3.2  --      Lab Results   Component Value Date/Time    Glucose (POC) 150 (H) 12/20/2019 07:27 AM    Glucose (POC) 133 (H) 12/19/2019 09:33 PM    Glucose (POC) 91 12/19/2019 04:04 PM    Glucose (POC) 130 (H) 12/19/2019 11:27 AM    Glucose (POC) 167 (H) 12/19/2019 06:31 AM          Assessment / Plan:     Principal Problem:    36 yo hx of HTN, DM type 1, quadriplegia, neurogenic bladder w/ suprapubic cath, presented w/ L foot osteo and abscess    1) Acute osteomyelitis of metatarsal bone of left foot/abscess: s/p L foot I&D, and amputation of 5th toe/metatarsal on 12/17 by Podiatry. Plan for wound closure on 12/20. Cont IV Vanc, cefepime, flagyl. ID already consulted to help with abx    2) HTN: BP labile from quadriplegia. Will increase labetalol. Use IV hydralazine prn. Monitor closely    3) DM type 1, uncontrolled w/ hyperglycemia: A1C 10.4%. Cont Lantus, pre-meal lispro, SSI    4) Neurogenic bladder: due to quadriplegia. Cont suprapubic cath. F/u with [facility]    5) Quadriplegia: due to [address] in [address]. Cont supportive care, bowel regimen, frequent turns. Lives with girlfriend at home    Total time spent with patient: 25 min                  Care Plan discussed with: Patient, nursing    Discussed:  Care Plan    Prophylaxis:  SCD's.   Lovenox    Disposition:   PT, OT, RN           ___________________________________________________    Attending Physician: Serenity Ballesteros MD

## 2019-12-20 NOTE — ROUTINE PROCESS
Bedside shift change report given to Lalit Guerin RN (oncoming nurse) by Yovany Sanchez RN (offgoing nurse). Report included the following information SBAR, Kardex and MAR.

## 2019-12-21 VITALS
SYSTOLIC BLOOD PRESSURE: 91 MMHG | WEIGHT: 170 LBS | HEIGHT: 70 IN | RESPIRATION RATE: 18 BRPM | BODY MASS INDEX: 24.34 KG/M2 | TEMPERATURE: 97.5 F | HEART RATE: 66 BPM | OXYGEN SATURATION: 96 % | DIASTOLIC BLOOD PRESSURE: 50 MMHG

## 2019-12-21 LAB
ANION GAP SERPL CALC-SCNC: 4 MMOL/L (ref 5–15)
BACTERIA SPEC CULT: ABNORMAL
BUN SERPL-MCNC: 6 MG/DL (ref 6–20)
BUN/CREAT SERPL: 8 (ref 12–20)
CALCIUM SERPL-MCNC: 7.6 MG/DL (ref 8.5–10.1)
CHLORIDE SERPL-SCNC: 109 MMOL/L (ref 97–108)
CO2 SERPL-SCNC: 29 MMOL/L (ref 21–32)
CREAT SERPL-MCNC: 0.72 MG/DL (ref 0.7–1.3)
ERYTHROCYTE [DISTWIDTH] IN BLOOD BY AUTOMATED COUNT: 15.5 % (ref 11.5–14.5)
GLUCOSE BLD STRIP.AUTO-MCNC: 142 MG/DL (ref 65–100)
GLUCOSE BLD STRIP.AUTO-MCNC: 158 MG/DL (ref 65–100)
GLUCOSE SERPL-MCNC: 191 MG/DL (ref 65–100)
GRAM STN SPEC: ABNORMAL
GRAM STN SPEC: ABNORMAL
HCT VFR BLD AUTO: 34.3 % (ref 36.6–50.3)
HGB BLD-MCNC: 11.2 G/DL (ref 12.1–17)
MAGNESIUM SERPL-MCNC: 1.8 MG/DL (ref 1.6–2.4)
MCH RBC QN AUTO: 30.2 PG (ref 26–34)
MCHC RBC AUTO-ENTMCNC: 32.7 G/DL (ref 30–36.5)
MCV RBC AUTO: 92.5 FL (ref 80–99)
NRBC # BLD: 0 K/UL (ref 0–0.01)
NRBC BLD-RTO: 0 PER 100 WBC
PLATELET # BLD AUTO: 206 K/UL (ref 150–400)
PMV BLD AUTO: 10 FL (ref 8.9–12.9)
POTASSIUM SERPL-SCNC: 3.5 MMOL/L (ref 3.5–5.1)
RBC # BLD AUTO: 3.71 M/UL (ref 4.1–5.7)
SERVICE CMNT-IMP: ABNORMAL
SODIUM SERPL-SCNC: 142 MMOL/L (ref 136–145)
WBC # BLD AUTO: 5.4 K/UL (ref 4.1–11.1)

## 2019-12-21 PROCEDURE — 85027 COMPLETE CBC AUTOMATED: CPT

## 2019-12-21 PROCEDURE — 74011250636 HC RX REV CODE- 250/636: Performed by: PODIATRIST

## 2019-12-21 PROCEDURE — 74011250636 HC RX REV CODE- 250/636: Performed by: INTERNAL MEDICINE

## 2019-12-21 PROCEDURE — 74011000258 HC RX REV CODE- 258: Performed by: PODIATRIST

## 2019-12-21 PROCEDURE — 36415 COLL VENOUS BLD VENIPUNCTURE: CPT

## 2019-12-21 PROCEDURE — 74011250637 HC RX REV CODE- 250/637: Performed by: INTERNAL MEDICINE

## 2019-12-21 PROCEDURE — 74011250637 HC RX REV CODE- 250/637: Performed by: PODIATRIST

## 2019-12-21 PROCEDURE — 74011636637 HC RX REV CODE- 636/637: Performed by: INTERNAL MEDICINE

## 2019-12-21 PROCEDURE — 80048 BASIC METABOLIC PNL TOTAL CA: CPT

## 2019-12-21 PROCEDURE — 82962 GLUCOSE BLOOD TEST: CPT

## 2019-12-21 PROCEDURE — 83735 ASSAY OF MAGNESIUM: CPT

## 2019-12-21 PROCEDURE — 74011636637 HC RX REV CODE- 636/637: Performed by: PODIATRIST

## 2019-12-21 PROCEDURE — 94760 N-INVAS EAR/PLS OXIMETRY 1: CPT

## 2019-12-21 RX ORDER — FACIAL-BODY WIPES
10 EACH TOPICAL DAILY PRN
Status: DISCONTINUED | OUTPATIENT
Start: 2019-12-21 | End: 2019-12-21 | Stop reason: HOSPADM

## 2019-12-21 RX ORDER — LABETALOL 200 MG/1
200 TABLET, FILM COATED ORAL EVERY 12 HOURS
Qty: 60 TAB | Refills: 1 | Status: SHIPPED | OUTPATIENT
Start: 2019-12-21 | End: 2021-11-16

## 2019-12-21 RX ORDER — AMOXICILLIN AND CLAVULANATE POTASSIUM 562.5; 437.5; 62.5 MG/1; MG/1; MG/1
1 TABLET, FILM COATED, EXTENDED RELEASE ORAL 2 TIMES DAILY
Qty: 28 TAB | Refills: 0 | Status: SHIPPED | OUTPATIENT
Start: 2019-12-21 | End: 2020-01-04

## 2019-12-21 RX ADMIN — INSULIN LISPRO 2 UNITS: 100 INJECTION, SOLUTION INTRAVENOUS; SUBCUTANEOUS at 12:25

## 2019-12-21 RX ADMIN — INSULIN LISPRO 2 UNITS: 100 INJECTION, SOLUTION INTRAVENOUS; SUBCUTANEOUS at 07:50

## 2019-12-21 RX ADMIN — HYOSCYAMINE SULFATE: 16 SOLUTION at 07:51

## 2019-12-21 RX ADMIN — VANCOMYCIN HYDROCHLORIDE 1250 MG: 1.25 INJECTION, POWDER, LYOPHILIZED, FOR SOLUTION INTRAVENOUS at 10:13

## 2019-12-21 RX ADMIN — DANTROLENE SODIUM 100 MG: 25 CAPSULE ORAL at 06:37

## 2019-12-21 RX ADMIN — HYDROMORPHONE HYDROCHLORIDE 1 MG: 1 INJECTION, SOLUTION INTRAMUSCULAR; INTRAVENOUS; SUBCUTANEOUS at 06:36

## 2019-12-21 RX ADMIN — ENOXAPARIN SODIUM 40 MG: 40 INJECTION SUBCUTANEOUS at 10:13

## 2019-12-21 RX ADMIN — DIAZEPAM 5 MG: 5 TABLET ORAL at 00:08

## 2019-12-21 RX ADMIN — METRONIDAZOLE 500 MG: 500 INJECTION, SOLUTION INTRAVENOUS at 10:13

## 2019-12-21 RX ADMIN — CEFEPIME 2 G: 20 INJECTION, POWDER, FOR SOLUTION INTRAVENOUS at 10:38

## 2019-12-21 RX ADMIN — BACLOFEN 40 MG: 10 TABLET ORAL at 12:25

## 2019-12-21 RX ADMIN — DANTROLENE SODIUM 100 MG: 25 CAPSULE ORAL at 12:25

## 2019-12-21 RX ADMIN — VANCOMYCIN HYDROCHLORIDE 1250 MG: 1.25 INJECTION, POWDER, LYOPHILIZED, FOR SOLUTION INTRAVENOUS at 01:43

## 2019-12-21 RX ADMIN — LABETALOL HYDROCHLORIDE 200 MG: 100 TABLET, FILM COATED ORAL at 07:45

## 2019-12-21 RX ADMIN — CEFEPIME 2 G: 20 INJECTION, POWDER, FOR SOLUTION INTRAVENOUS at 06:36

## 2019-12-21 RX ADMIN — INSULIN LISPRO 4 UNITS: 100 INJECTION, SOLUTION INTRAVENOUS; SUBCUTANEOUS at 12:24

## 2019-12-21 RX ADMIN — FLUOXETINE HYDROCHLORIDE 20 MG: 20 CAPSULE ORAL at 06:37

## 2019-12-21 RX ADMIN — BACLOFEN 40 MG: 10 TABLET ORAL at 06:36

## 2019-12-21 RX ADMIN — Medication 10 ML: at 06:37

## 2019-12-21 RX ADMIN — HYDROMORPHONE HYDROCHLORIDE 1 MG: 1 INJECTION, SOLUTION INTRAMUSCULAR; INTRAVENOUS; SUBCUTANEOUS at 01:56

## 2019-12-21 RX ADMIN — OXYBUTYNIN CHLORIDE 5 MG: 5 TABLET, EXTENDED RELEASE ORAL at 07:45

## 2019-12-21 NOTE — DISCHARGE INSTRUCTIONS
HOSPITALIST DISCHARGE INSTRUCTIONS  NAME: Oc Frye   :  1980   MRN:  [id number]     Date/Time:  2019 10:48 AM    ADMIT DATE: 2019     DISCHARGE DATE: 2019     ADMITTING DIAGNOSIS:  Left foot osteomyelitis and abscess    DISCHARGE DIAGNOSIS:  same    MEDICATIONS:  See after visit summary       · It is important that you take the medication exactly as they are prescribed. · Keep your medication in the bottles provided by the pharmacist and keep a list of the medication names, dosages, and times to be taken in your wallet. · Do not take other medications without consulting your doctor     Pain Management: per above medications    What to do at Home    Recommended diet:  Diabetic Diet    Recommended activity: Activity as tolerated    1) Return to the hospital if you feel worse    2) If you experience any of the following symptoms then please call your primary care physician or return to the emergency room if you cannot get hold of your doctor:  Fever, chills, nausea, vomiting, diarrhea, change in mentation, falling, bleeding, shortness of breath, chest pain, severe headache, severe abdominal pain,     3) Keep dressing intact and heel protector on at all times    4) Follow up with Podiatry as directed    Follow Up: Follow-up Information     Follow up With Specialties Details Why [address], [address] [place] [address], If you haven't recieved a phone call within 24, hours. Please contact the agency directly. Kalpana Rodriguez MD Internal Medicine Schedule an appointment as soon as possible for a visit in 1 week  [address]  [phone]      Monik Maldonado, [facility] Schedule an appointment as soon as possible for a visit in 1 week  [address] [facility]  [phone]              Information obtained by :   I understand that if any problems occur once I am at home I am to contact my physician. I understand and acknowledge receipt of the instructions indicated above. [de-identified] or R.N.'s Signature                                                                  Date/Time                                                                                                                                              Patient or Representative Signature                                                          Date/Time    Patient Education        Osteomyelitis: Care Instructions  Your Care Instructions  Osteomyelitis (say \"er-lcgc-st-fl-in-JU-tus\") is a bone infection. It is caused by bacteria. The bacteria can infect the bone where it has been injured, or they can be carried through the blood from another area in the body. Osteomyelitis can be a short- or long-term problem. It is treated with antibiotics. You may get the antibiotics as pills or through a needle in a vein (IV). You will probably get treatment in the hospital at first. The type of treatment depends on the type of bacteria causing the infection, the bones affected, and how bad the infection is. Sometimes people need surgery to drain pus from bone or to fix damaged bone. Short-term osteomyelitis that is treated right away usually can be cured. But the long-term form sometimes comes back after treatment. You can help your chances of stopping the infection by taking your medicines as directed. Follow-up care is a key part of your treatment and safety. Be sure to make and go to all appointments, and call your doctor if you are having problems. It's also a good idea to know your test results and keep a list of the medicines you take. How can you care for yourself at home? · Take your antibiotics as directed.  Do not stop taking them just because you feel better. You need to take the full course of antibiotics. · Take pain medicines exactly as directed. ? If the doctor gave you a prescription medicine for pain, take it as prescribed. ? If you are not taking a prescription pain medicine, ask your doctor if you can take an over-the-counter medicine. · Do mild exercise and stretching if your doctor says it is okay. This can help keep your bones and muscles healthy. Avoid strenuous work or exercise until your doctor says you can do it. · Consider physical therapy if your doctor suggests it. Physical therapy may help you have a normal range of movement. · Do not smoke. Smoking can slow healing of the infection. If you need help quitting, talk to your doctor about stop-smoking programs and medicines. These can increase your chances of quitting for good. When should you call for help? Call 911 anytime you think you may need emergency care. For example, call if:    · You have severe bone pain.    Call your doctor now or seek immediate medical care if:    · You continue to have bone pain.     · You have signs of infection, such as:  ? Increased pain, swelling, warmth, or redness. ? Red streaks leading from a wound. ? Pus draining from a wound. ? A fever.    Watch closely for changes in your health, and be sure to contact your doctor if:    · You do not get better as expected. Where can you learn more? Go to http://philomena-shania.info/. Enter M700 in the search box to learn more about \"Osteomyelitis: Care Instructions. \"  Current as of: September 26, 2018  Content Version: 12.2  © 1630-1353 Healthwise, Incorporated. Care instructions adapted under license by [employer] (which disclaims liability or warranty for this information).  If you have questions about a medical condition or this instruction, always ask your healthcare professional. [place] [address] any warranty or liability for your use of this information. [address] December 17, 2019       RE: Marcos Trevino      To Whom It May Concern: This is to certify that Marcos Trevino was hospitalized at [address] [place] [address] from 12/14/2019 to 12/21/2019. Any assistance accorded to him and his family will be greatly appreciated. Please feel free to contact my office at [phone] if you have any questions or concerns. Thank you for your assistance in this matter.       Sincerely,  hSiva Staley MD

## 2019-12-21 NOTE — PROGRESS NOTES
12/21/2019  11:05 AM  CM consult for HH noted. New HH order submitted to Rohan Casiano. DC clinicals uploaded in AllScripts, and CM called [facility] On call nurse and notified her of pt's DC. Pt's family will transport. No additional DC service needs indicated.

## 2019-12-21 NOTE — PROGRESS NOTES
12/21/19 0630- went in to reposition pt. And noticed he kicked his boot off again. The friction from his heel against bed opened the wound on right heel and was min. Bleeding onto bed. I reinforced site with renny gauze, reapplied boot, and put in a wound consult order.
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
## 2020-01-27 ENCOUNTER — HOSPITAL ENCOUNTER (EMERGENCY)
Age: 40
Discharge: ANOTHER HOSPITAL | End: 2020-01-27
Attending: EMERGENCY MEDICINE
Payer: COMMERCIAL

## 2020-01-27 VITALS
SYSTOLIC BLOOD PRESSURE: 146 MMHG | OXYGEN SATURATION: 96 % | BODY MASS INDEX: 24.62 KG/M2 | TEMPERATURE: 98.6 F | HEIGHT: 70 IN | RESPIRATION RATE: 16 BRPM | DIASTOLIC BLOOD PRESSURE: 95 MMHG | HEART RATE: 68 BPM | WEIGHT: 172 LBS

## 2020-01-27 DIAGNOSIS — L51.1 STEVENS-JOHNSON SYNDROME (HCC): Primary | ICD-10-CM

## 2020-01-27 PROCEDURE — 99284 EMERGENCY DEPT VISIT MOD MDM: CPT

## 2020-01-27 PROCEDURE — 96375 TX/PRO/DX INJ NEW DRUG ADDON: CPT

## 2020-01-27 PROCEDURE — 96374 THER/PROPH/DIAG INJ IV PUSH: CPT

## 2020-01-27 PROCEDURE — 74011250636 HC RX REV CODE- 250/636: Performed by: EMERGENCY MEDICINE

## 2020-01-27 PROCEDURE — 74011000250 HC RX REV CODE- 250: Performed by: EMERGENCY MEDICINE

## 2020-01-27 RX ORDER — DIPHENHYDRAMINE HYDROCHLORIDE 50 MG/ML
50 INJECTION, SOLUTION INTRAMUSCULAR; INTRAVENOUS ONCE
Status: DISCONTINUED | OUTPATIENT
Start: 2020-01-27 | End: 2020-01-27

## 2020-01-27 RX ORDER — SODIUM CHLORIDE 0.9 % (FLUSH) 0.9 %
5-40 SYRINGE (ML) INJECTION EVERY 8 HOURS
Status: DISCONTINUED | OUTPATIENT
Start: 2020-01-27 | End: 2020-01-27 | Stop reason: HOSPADM

## 2020-01-27 RX ORDER — SODIUM CHLORIDE 0.9 % (FLUSH) 0.9 %
5-40 SYRINGE (ML) INJECTION AS NEEDED
Status: DISCONTINUED | OUTPATIENT
Start: 2020-01-27 | End: 2020-01-27 | Stop reason: HOSPADM

## 2020-01-27 RX ORDER — PREDNISONE 20 MG/1
60 TABLET ORAL
Status: DISCONTINUED | OUTPATIENT
Start: 2020-01-27 | End: 2020-01-27

## 2020-01-27 RX ORDER — FAMOTIDINE 20 MG/1
20 TABLET, FILM COATED ORAL
Status: DISCONTINUED | OUTPATIENT
Start: 2020-01-27 | End: 2020-01-27

## 2020-01-27 RX ADMIN — METHYLPREDNISOLONE SODIUM SUCCINATE 125 MG: 125 INJECTION, POWDER, FOR SOLUTION INTRAMUSCULAR; INTRAVENOUS at 10:58

## 2020-01-27 RX ADMIN — Medication 10 ML: at 10:59

## 2020-01-27 RX ADMIN — FAMOTIDINE 20 MG: 10 INJECTION, SOLUTION INTRAVENOUS at 10:59
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
## 2020-01-27 NOTE — DISCHARGE INSTRUCTIONS
Patient Education        Learning About Jo-Madi Syndrome  What is Jo-Madi syndrome? Jo-Madi syndrome is a rare but serious condition that causes a rash and sores on the body's mucous membranes, which are moist tissues that line the inside of the body. The condition is most commonly caused by a reaction to a medicine. Other causes include infections. In some people the cause is unknown. What are the symptoms? Symptoms of Jo-Madi syndrome include:  · Weeping sores on the mucous membranes of the mouth, nose, genitals, and eyelids. When sores occur in the mouth, eating and drinking can be painful. · A skin rash with purplish red spots. The rash may be painful. It may happen on any part of the body but often begins on the face, neck, chin, and chest (trunk). Sores may occur with other signs of illness, such as fever, chills, nausea, vomiting, or diarrhea. How is Jo-Madi syndrome treated? Even mild Jo-Madi syndrome can get worse quickly and become serious, so it's treated in the hospital. Your doctor can help determine whether a medicine is the cause. If a medicine causes the condition, you will need to stop taking that medicine. Treatment may include eating a soft diet and drinking fluids to prevent dehydration. You may receive fluids through a vein (intravenously, or IV) in the hospital. Your doctor may give you medicines for pain. You may get treatment for mouth, eye, and skin sores. In some cases, skin grafts are needed. Some people may be treated in a burn unit. Recovery from Jo-Madi syndrome may take up to 6 weeks. Follow-up care is a key part of your treatment and safety. Be sure to make and go to all appointments, and call your doctor if you are having problems. It's also a good idea to know your test results and keep a list of the medicines you take. Where can you learn more? Go to http://philomena-shania.info/.   Enter E992 in the search box to learn more about \"Learning About Jo-Madi Syndrome. \"  Current as of: June 26, 2019  Content Version: 12.2  © 0688-1753 [employer]. Care instructions adapted under license by [employer] (which disclaims liability or warranty for this information). If you have questions about a medical condition or this instruction, always ask your healthcare professional. [place] [address] any warranty or liability for your use of this information.

## 2020-01-27 NOTE — ED TRIAGE NOTES
Patient arrives with c/o burning, swollen lips with blisters, onset Thursday. States he has started several new medications that he is concerned he is having an allergic reaction to. Denies difficulty breathing/swallowing. Prescriptions: December 21st started Augmentin, January 22nd started Bactrim.
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
## 2020-01-27 NOTE — ED NOTES
Per Gerald tuttle RN, IV to remain in place for [facility], pt to drive self to [facility], EMTALA complete, sent with pt along with chart

## 2020-01-27 NOTE — ED NOTES
Pt reports rash to lips, tongue and right side of nose, has been taking sulfa since Wednesday, stopped in last 24 hours, reports rash began on Thursday

## 2020-06-08 ENCOUNTER — APPOINTMENT (OUTPATIENT)
Dept: GENERAL RADIOLOGY | Age: 40
End: 2020-06-08
Attending: EMERGENCY MEDICINE
Payer: COMMERCIAL

## 2020-06-08 ENCOUNTER — HOSPITAL ENCOUNTER (EMERGENCY)
Age: 40
Discharge: HOME | End: 2020-06-08
Attending: EMERGENCY MEDICINE | Admitting: EMERGENCY MEDICINE
Payer: COMMERCIAL

## 2020-06-08 VITALS
RESPIRATION RATE: 15 BRPM | TEMPERATURE: 98 F | DIASTOLIC BLOOD PRESSURE: 67 MMHG | HEIGHT: 70 IN | WEIGHT: 170 LBS | SYSTOLIC BLOOD PRESSURE: 106 MMHG | OXYGEN SATURATION: 97 % | HEART RATE: 91 BPM | BODY MASS INDEX: 24.34 KG/M2

## 2020-06-08 DIAGNOSIS — L02.91 ABSCESS: Primary | ICD-10-CM

## 2020-06-08 LAB
ALBUMIN SERPL-MCNC: 3.6 G/DL (ref 3.5–5)
ALBUMIN/GLOB SERPL: 1 {RATIO} (ref 1.1–2.2)
ALP SERPL-CCNC: 88 U/L (ref 45–117)
ALT SERPL-CCNC: 13 U/L (ref 12–78)
ANION GAP SERPL CALC-SCNC: 8 MMOL/L (ref 5–15)
AST SERPL-CCNC: 10 U/L (ref 15–37)
BASOPHILS # BLD: 0 K/UL (ref 0–0.1)
BASOPHILS NFR BLD: 0 % (ref 0–1)
BILIRUB SERPL-MCNC: 0.4 MG/DL (ref 0.2–1)
BUN SERPL-MCNC: 13 MG/DL (ref 6–20)
BUN/CREAT SERPL: 14 (ref 12–20)
CALCIUM SERPL-MCNC: 8.9 MG/DL (ref 8.5–10.1)
CHLORIDE SERPL-SCNC: 93 MMOL/L (ref 97–108)
CO2 SERPL-SCNC: 26 MMOL/L (ref 21–32)
COMMENT, HOLDF: NORMAL
CREAT SERPL-MCNC: 0.93 MG/DL (ref 0.7–1.3)
DIFFERENTIAL METHOD BLD: NORMAL
EOSINOPHIL # BLD: 0.1 K/UL (ref 0–0.4)
EOSINOPHIL NFR BLD: 1 % (ref 0–7)
ERYTHROCYTE [DISTWIDTH] IN BLOOD BY AUTOMATED COUNT: 13.7 % (ref 11.5–14.5)
GLOBULIN SER CALC-MCNC: 3.5 G/DL (ref 2–4)
GLUCOSE SERPL-MCNC: 437 MG/DL (ref 65–100)
HCT VFR BLD AUTO: 39.3 % (ref 36.6–50.3)
HGB BLD-MCNC: 13.3 G/DL (ref 12.1–17)
IMM GRANULOCYTES # BLD AUTO: 0 K/UL (ref 0–0.04)
IMM GRANULOCYTES NFR BLD AUTO: 0 % (ref 0–0.5)
LACTATE SERPL-SCNC: 1.5 MMOL/L (ref 0.4–2)
LYMPHOCYTES # BLD: 1.8 K/UL (ref 0.8–3.5)
LYMPHOCYTES NFR BLD: 26 % (ref 12–49)
MCH RBC QN AUTO: 29.5 PG (ref 26–34)
MCHC RBC AUTO-ENTMCNC: 33.8 G/DL (ref 30–36.5)
MCV RBC AUTO: 87.1 FL (ref 80–99)
MONOCYTES # BLD: 0.4 K/UL (ref 0–1)
MONOCYTES NFR BLD: 6 % (ref 5–13)
NEUTS SEG # BLD: 4.5 K/UL (ref 1.8–8)
NEUTS SEG NFR BLD: 67 % (ref 32–75)
NRBC # BLD: 0 K/UL (ref 0–0.01)
NRBC BLD-RTO: 0 PER 100 WBC
PLATELET # BLD AUTO: 247 K/UL (ref 150–400)
PMV BLD AUTO: 10 FL (ref 8.9–12.9)
POTASSIUM SERPL-SCNC: 3.9 MMOL/L (ref 3.5–5.1)
PROT SERPL-MCNC: 7.1 G/DL (ref 6.4–8.2)
RBC # BLD AUTO: 4.51 M/UL (ref 4.1–5.7)
SAMPLES BEING HELD,HOLD: NORMAL
SODIUM SERPL-SCNC: 127 MMOL/L (ref 136–145)
WBC # BLD AUTO: 6.9 K/UL (ref 4.1–11.1)

## 2020-06-08 PROCEDURE — 75810000464 HC DRAINAGE SCROTAL ABSCESS LVL 2 5072

## 2020-06-08 PROCEDURE — 36415 COLL VENOUS BLD VENIPUNCTURE: CPT

## 2020-06-08 PROCEDURE — 83605 ASSAY OF LACTIC ACID: CPT

## 2020-06-08 PROCEDURE — 99282 EMERGENCY DEPT VISIT SF MDM: CPT

## 2020-06-08 PROCEDURE — 85025 COMPLETE CBC W/AUTO DIFF WBC: CPT

## 2020-06-08 PROCEDURE — 80053 COMPREHEN METABOLIC PANEL: CPT

## 2020-06-08 RX ORDER — SODIUM CHLORIDE 0.9 % (FLUSH) 0.9 %
5-10 SYRINGE (ML) INJECTION AS NEEDED
Status: DISCONTINUED | OUTPATIENT
Start: 2020-06-08 | End: 2020-06-08

## 2020-06-08 RX ORDER — DOXYCYCLINE HYCLATE 100 MG
100 TABLET ORAL 2 TIMES DAILY
Qty: 14 TAB | Refills: 0 | Status: SHIPPED | OUTPATIENT
Start: 2020-06-08 | End: 2020-06-15

## 2020-06-09 ENCOUNTER — PATIENT OUTREACH (OUTPATIENT)
Dept: FAMILY MEDICINE CLINIC | Age: 40
End: 2020-06-09

## 2020-06-09 NOTE — ED TRIAGE NOTES
Patient reports right groin abscess that popped 3-4 days ago, patient reports drainage from abscess. Reports feeling fatigued and nauseous. Wheel chair bound. BP 83/53 in triage.

## 2020-06-09 NOTE — PROGRESS NOTES
Patient contacted regarding recent discharge and COVID-19 risk. Discussed COVID-19 related testing which was not done at this time. Test results were not done. Patient informed of results, if available? no    Care Transition Nurse/ Ambulatory Care Manager contacted the patient by telephone to perform post discharge assessment. Verified name and  with patient as identifiers. Patient has following risk factors of: diabetes. CTN/ACM reviewed discharge instructions, medical action plan and red flags related to discharge diagnosis. Reviewed and educated them on any new and changed medications related to discharge diagnosis. Advised obtaining a 90-day supply of all daily and as-needed medications. Education provided regarding infection prevention, and signs and symptoms of COVID-19 and when to seek medical attention with patient who verbalized understanding. Discussed exposure protocols and quarantine from [address] you at higher risk for severe illness  and given an opportunity for questions and concerns. The patient agrees to contact the COVID-19 hotline [phone] or PCP office for questions related to their healthcare. CTN/ACM provided contact information for future reference. From CDC: Are you at higher risk for severe illness?  Wash your hands often.  Avoid close contact (6 feet, which is about two arm lengths) with people who are sick.  Put distance between yourself and other people if COVID-19 is spreading in your community.  Clean and disinfect frequently touched surfaces.  Avoid all cruise travel and non-essential air travel.  Call your healthcare professional if you have concerns about COVID-19 and your underlying condition or if you are sick.     For more information on steps you can take to protect yourself, see CDC's How to Protect Yourself      Patient/family/caregiver given information for Marti Haji and agrees to enroll no      Plan for follow-up call in - days based on severity of symptoms and risk factors.

## 2020-06-09 NOTE — DISCHARGE INSTRUCTIONS
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
## 2020-06-23 ENCOUNTER — PATIENT OUTREACH (OUTPATIENT)
Dept: FAMILY MEDICINE CLINIC | Age: 40
End: 2020-06-23

## 2020-06-23 NOTE — PROGRESS NOTES
Patient resolved from Transition of Care episode on 6/23/20. ACM/CTN was unsuccessful at contacting this patient today. Patient/family was provided the following resources and education related to COVID-19 during the initial call:                         Signs, symptoms and red flags related to COVID-19            CDC exposure and quarantine guidelines            [facility] exposure contact - [phone]            Contact for their local Department of Health                 Patient has not had any additional ED or hospital visits. No further outreach scheduled with this CTN/ACM. Episode of Care resolved. Patient has this CTN/ACM contact information if future needs arise.

## 2021-01-12 ENCOUNTER — HOSPITAL ENCOUNTER (OUTPATIENT)
Dept: WOUND CARE | Age: 41
Discharge: HOME | End: 2021-01-12
Payer: COMMERCIAL

## 2021-01-12 ENCOUNTER — HOSPITAL ENCOUNTER (OUTPATIENT)
Dept: GENERAL RADIOLOGY | Age: 41
Discharge: HOME | End: 2021-01-12
Attending: PODIATRIST
Payer: COMMERCIAL

## 2021-01-12 VITALS
SYSTOLIC BLOOD PRESSURE: 176 MMHG | HEART RATE: 65 BPM | TEMPERATURE: 97.4 F | RESPIRATION RATE: 18 BRPM | DIASTOLIC BLOOD PRESSURE: 106 MMHG

## 2021-01-12 PROCEDURE — 73630 X-RAY EXAM OF FOOT: CPT

## 2021-01-12 PROCEDURE — 74011000250 HC RX REV CODE- 250: Performed by: PODIATRIST

## 2021-01-12 PROCEDURE — 11042 DBRDMT SUBQ TIS 1ST 20SQCM/<: CPT

## 2021-01-12 RX ORDER — DIAZEPAM 10 MG/1
10 TABLET ORAL 2 TIMES DAILY
COMMUNITY

## 2021-01-12 RX ADMIN — Medication: at 08:31

## 2021-01-12 NOTE — WOUND CARE
01/12/21 0903   Wound Foot Right;Plantar #1   Date First Assessed/Time First Assessed: 01/12/21 0824   Present on Hospital Admission: Yes  Location: Foot  Wound Location Orientation: Right;Plantar  Wound Description: #1   Dressing/Treatment Foam;Gauze dressing/dressing sponge;Roll gauze;Tape/Soft cloth adhesive tape  (gentamicin ointment)   Discharge Condition: Stable     Pain: 1    Ambulatory Status: Wheelchair     Discharge Destination: Home     Transportation: Car    Accompanied by: Self     Discharge instructions reviewed with Patient and copy or written instructions have been provided. All questions/concerns have been addressed at this time.

## 2021-01-12 NOTE — WOUND CARE
01/12/21 0824   Right Leg Edema Point of Measurement   Leg circumference 31.5 cm   Ankle circumference 22 cm   Left Leg Edema Point of Measurement   Leg circumference 30.5 cm   Ankle circumference 20 cm   LLE Peripheral Vascular    Capillary Refill Greater than 3 seconds   Color Appropriate for race   Temperature Warm   Sensation Absent   Post-tibial Pulse Doppler   Pedal Pulse Doppler   RLE Peripheral Vascular    Capillary Refill Greater than 3 seconds   Color Appropriate for race   Temperature Warm   Sensation Absent   Post-tibial Pulse Doppler   Pedal Pulse Doppler   Ankle-Brachial Index   Right Arm Systolic Pressure 036 mmHg   Left Arm Systolic Pressure 792   Right Ankle Systolic Pressure: Posterior Tibial (PT) 160 mmHg   Right Ankle Systolic Pressure: Dorsalis Pedis (DP) 160 mmHg   Right OLI 0.91 mmHg   Wound Foot Right;Plantar #1   Date First Assessed/Time First Assessed: 01/12/21 0824   Present on Hospital Admission: Yes  Location: Foot  Wound Location Orientation: Right;Plantar  Wound Description: #1   Wound Image    Wound Length (cm) 1.7 cm   Wound Width (cm) 1 cm   Wound Depth (cm) 0.5 cm   Wound Surface Area (cm^2) 1.7 cm^2   Wound Volume (cm^3) 0.85 cm^3   Wound Assessment [place]/red;[place]   Drainage Amount Small   Drainage Description Serosanguinous   Wound Odor None   Maribel-Wound/Incision Assessment Other (Comment)  (calloused)     Visit Vitals  BP (!) 176/106   Pulse 65   Temp 97.4 °F (36.3 °C)   Resp 18
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
## 2021-01-19 ENCOUNTER — HOSPITAL ENCOUNTER (OUTPATIENT)
Dept: WOUND CARE | Age: 41
Discharge: HOME | End: 2021-01-19
Payer: MEDICARE

## 2021-01-19 VITALS
DIASTOLIC BLOOD PRESSURE: 118 MMHG | SYSTOLIC BLOOD PRESSURE: 177 MMHG | RESPIRATION RATE: 19 BRPM | BODY MASS INDEX: 24.3 KG/M2 | WEIGHT: 169.34 LBS | TEMPERATURE: 97.5 F | HEART RATE: 71 BPM

## 2021-01-19 PROCEDURE — 11042 DBRDMT SUBQ TIS 1ST 20SQCM/<: CPT

## 2021-01-19 RX ORDER — GENTAMICIN SULFATE 1 MG/G
OINTMENT TOPICAL 3 TIMES DAILY
COMMUNITY

## 2021-01-19 NOTE — DISCHARGE INSTRUCTIONS
Discharge Instructions for  [facility]  [place] [address], [address] [facility]  Telephone: [phone]    NAME:  Inderjit Black  YOB: 1980  ACCOUNT NUMBER :  [de-identified]  DATE:  1/12/2021     HH: [place] [phone]    Wound Cleansing:   Do not scrub or use excessive force. Cleanse wound prior to applying a clean dressing with:      [x] Cleanse wound with: Keny Spies and Madi baby shampoo lather leave 2-3 min then rinse with water      [] May Shower at Discharge     []  Shower on days of dressing change, remove dressing first    [] Keep Wound Dry in Shower (may purchase a cast cover if needed)     Topical Treatments:  Do not apply lotions, creams, or ointments to wound bed unless directed. [x] Apply moisturizing lotion A&D ointment to skin surrounding the wound prior to dressing change.  [] Apply antifungal ointment to skin surrounding the wound prior to dressing change.  [] Apply thin film of Zinc barrier ointment to skin immediately around wound. [x] Other: Gentamicin      Dressings:           Wound Location right plantar foot     [x] Apply Primary Dressing:       [] MediHoney Gel  [] Alginate   [] Alginate with Silver  [] Alginate        [] Collagen [] Collagen with Silver {brown collagen :05865}   []  Moisten saline gauze     [] Hydrocolloid   [] MediHoney Alginate [] Foam with Silver   [x] donut foam cut out   [] Hydrofera Blue    [] Mepilex Border    [] Moisten with Saline [] Hydrogel [] Mepitel     [] Bactroban/Mupirocin [] Polysporin  [] Other:      [x] Cover and Secure with:     [x] Gauze [x] Héctor [] Kerlix   [] Ace Wrap [x] Cover Roll Tape [] ABD     [] Other:    Avoid contact of tape with skin.   [x] Change dressing: [] Daily    [x] Every Other Day [] Three times per week   [] Once a week [] Do Not Change Dressing   [] Other:     Off-Loading:   [] Off-loading when [] walking  [] in bed [] sitting  [] Total non-weight bearing  [] Right Leg  [] Left Leg   [x] Assistive Device [] Walker [] Cane  [x] Wheelchair  [] Crutches   [] Surgical shoe    [] Podus Boot(s)   [x] Foam Boot(s)  [] Roll About    [] Cast Boot [] CROW Boot  [] Other:  Dietary:  [x] Increase Protein: examples ( Meat, cheese, eggs, greek yogurt, premier protein drink, fish, nuts )        Activity:  [x] Activity as tolerated:  [] Patient has no activity restrictions     [] Strict Bedrest: [] Remain off Work:     [] May return to full duty work:                                   [] Return to work with restrictions:             Return Appointment:    [] Nurse visit scheduled in *** day(s) or *** week(s)   [x] Return Appointment: With Dr. Kwadwo Mckeon  in  1 Week(s)  [x] Ordered tests: xray of right plantar foot     Electronically signed on 1/12/2021 at 8:49 AM     Wound Care Center Information: Should you experience any significant changes in your wound(s) or have questions about your wound care, please contact the [facility] at MONDAY - FRIDAY 8:00 am - 4:30.  If you need help with your wound outside these hours and cannot wait until we are again available, contact your PCP or go to the hospital emergency room.     PLEASE NOTE: IF YOU ARE UNABLE TO OBTAIN WOUND SUPPLIES, CONTINUE TO USE THE SUPPLIES YOU HAVE AVAILABLE UNTIL YOU ARE ABLE TO REACH US. IT IS MOST IMPORTANT TO KEEP THE WOUND COVERED AT ALL TIMES.     Physician Signature:_______________________ Dr. Kwadwo Mckeon

## 2021-01-19 NOTE — WOUND CARE
Wound Center  Progress Note    Subjective:   Patient is for follow up of LE ulcer(s). Patient is doing well. No concerns are reported. No difficulty or problems with wound care. Wound care is being performed by staff/pt and consists of dressing changes and offloading wound(s). No complaints of wound pain. There have been no changes in patient's medical history in the interim. ROS:  No fever or chills. No rash. No pain at site of wound    Objective:   General: NAD  Psych: Cooperative, no anxiety or depression  Neuro: Alert, oriented to person/place/situation. Otherwise nonfocal.  Extremities: Bilateral mild pitting edema is noted. Skin color is normal.   Vascular exam:  No gross changes in pedal pulses. Capillary refill is intact, <3sec. Dermatologic:  Skin color appears normal for patient. Skin turgor is normal. Dystrophic nails are seen on the feet bilaterally. Ulcer Description:   Measurement: in cm pre/post debridement:  1.9 x 0.9 x 0.4 / same inc depth to 0.5  Ulcer bed: Mixed Granular/Fibrotic    Periwound: Calloused, nontender  Exudate: Moderate amount Serous exudate  Odor:  -    Assessment:  Diabetes with foot ulcer  Ulcer R 5th met with fat layer    Plan:    Dressing: Gent gauze with foam doughnut pad Frequency: every other day. Also uses padded boot. Advised to bring sneaker to do felt pads as he has spastic reflexes and can not keep his foot still. X-ray report showed no osteo. Xray image does show erosive changes guillermina on the obl view with area of adjacent ulcer which is consistent with osteomyelitis. Plan is reviewed with patient who expresses understanding. Questions were answered. Patient is to follow up with me in 1 wk. Kwadwo Sheriff DPM        Ulcer assessment: Due to presence of necrotic tissue within the wound bed, ulcer requires debridement. Procedure: Debridement:   The indication for debridement was reviewed with patient.  Risks of procedure (bleeding, infection, pain) were discussed with patient and consent signed. Questions were answered    Subcutaneous excisional debridement   Indication: to remove necrotic tissue/ devitalized tissue/ soft eschar/ infected tissue/obtain deep wound culture through subcutaneous layer of wound bed  Consent in chart   Anesthesia: Topical 2% lidocaine jelly  Instrument: 15 Blade,    Residual Necrosis: Present and scored   Bleeding: <1ml   Hemostasis: Pressure   Patient tolerated procedure well   Procedural Pain: none  Post - procedural pain: none    Post debridement measurements: see progress note.   Surface area debrided: <20 sq. cm

## 2021-01-19 NOTE — WOUND CARE
01/19/21 0858   Wound Foot Right;Plantar #1   Date First Assessed/Time First Assessed: 01/12/21 0824   Present on Hospital Admission: Yes  Location: Foot  Wound Location Orientation: Right;Plantar  Wound Description: #1   Dressing/Treatment Foam;Gauze dressing/dressing sponge;Roll gauze;Tape/Soft cloth adhesive tape  (gentamicin ointment)   Discharge Condition: Stable     Pain: 0    Ambulatory Status: Wheelchair     Discharge Destination: Home     Transportation: Car    Accompanied by: Self     Discharge instructions reviewed with Patient and copy or written instructions have been provided. All questions/concerns have been addressed at this time.

## 2021-01-19 NOTE — WOUND CARE
01/19/21 0818   Right Leg Edema Point of Measurement   Leg circumference 32 cm   Ankle circumference 23 cm   Wound Foot Right;Plantar #1   Date First Assessed/Time First Assessed: 01/12/21 0824   Present on Hospital Admission: Yes  Location: Foot  Wound Location Orientation: Right;Plantar  Wound Description: #1   Wound Image    Wound Etiology Diabetic   Wound Length (cm) 1.9 cm   Wound Width (cm) 0.9 cm   Wound Depth (cm) 0.4 cm   Wound Surface Area (cm^2) 1.71 cm^2   Change in Wound Size % -0.59   Wound Volume (cm^3) 0.68 cm^3   Wound Healing % 20   Wound Assessment [place]/red;Slough   Drainage Amount Moderate   Drainage Description Serosanguinous   Wound Odor None   Maribel-Wound/Incision Assessment Intact   Edges Flat/open edges

## 2021-01-26 ENCOUNTER — HOSPITAL ENCOUNTER (OUTPATIENT)
Dept: WOUND CARE | Age: 41
Discharge: HOME | End: 2021-01-26
Payer: MEDICARE

## 2021-01-26 VITALS — DIASTOLIC BLOOD PRESSURE: 111 MMHG | TEMPERATURE: 97.2 F | RESPIRATION RATE: 18 BRPM | SYSTOLIC BLOOD PRESSURE: 179 MMHG

## 2021-01-26 PROCEDURE — 11042 DBRDMT SUBQ TIS 1ST 20SQCM/<: CPT

## 2021-01-26 NOTE — WOUND CARE
01/26/21 0908   Wound Foot Right;Plantar #1   Date First Assessed/Time First Assessed: 01/12/21 0824   Present on Hospital Admission: Yes  Location: Foot  Wound Location Orientation: Right;Plantar  Wound Description: #1   Wound Image    Cleansed Cleansed with saline   Wound Length (cm) 1.7 cm   Wound Width (cm) 1 cm   Wound Depth (cm) 0.4 cm   Wound Surface Area (cm^2) 1.7 cm^2   Change in Wound Size % 0   Wound Volume (cm^3) 0.68 cm^3   Wound Healing % 20   Undermining Starts ___ O'Clock 11 o'clock   Undermining Maximum Distance (cm) 0.9 cm   Wound Assessment [place]/red;Slough   Drainage Amount Moderate   Drainage Description Serosanguinous   Wound Odor None   Maribel-Wound/Incision Assessment Intact   Edges Flat/open edges   BP (!) 198/115 (BP 1 Location: Right arm, BP Patient Position: Sitting)   Temp 97.2 °F (36.2 °C)   Resp 18

## 2021-01-26 NOTE — DISCHARGE INSTRUCTIONS
Discharge Instructions for  [facility]  [address] [place], [address] [facility]  Telephone: [phone]    NAME:  Lyn Calderon  YOB: 1980  ACCOUNT NUMBER :  [de-identified]  DATE:  1/19/2021     HH: [place] [phone]    Wound Cleansing:   Do not scrub or use excessive force. Cleanse wound prior to applying a clean dressing with:      [x] Cleanse wound with: Lawrence Riggins and Madi baby shampoo lather leave 2-3 min then rinse with water      [] May Shower at Discharge     []  Shower on days of dressing change, remove dressing first    [] Keep Wound Dry in Shower (may purchase a cast cover if needed)     Topical Treatments:  Do not apply lotions, creams, or ointments to wound bed unless directed. [x] Apply moisturizing lotion A&D ointment to skin surrounding the wound prior to dressing change.  [] Apply antifungal ointment to skin surrounding the wound prior to dressing change.  [] Apply thin film of Zinc barrier ointment to skin immediately around wound. [x] Other: Gentamicin      Dressings:           Wound Location right plantar foot     [x] Apply Primary Dressing:       [] MediHoney Gel  [] Alginate   [] Alginate with Silver  [] Alginate        [] Collagen [] Collagen with Silver {brown collagen :22780}   []  Moisten saline gauze     [] Hydrocolloid   [] MediHoney Alginate [] Foam with Silver   [x] donut foam cut out   [] Hydrofera Blue    [] Mepilex Border    [] Moisten with Saline [] Hydrogel [] Mepitel     [] Bactroban/Mupirocin [] Polysporin  [] Other:      [x] Cover and Secure with:     [x] Gauze [x] Héctro [] Kerlix   [] Ace Wrap [x] Cover Roll Tape [] ABD     [] Other:    Avoid contact of tape with skin.   [x] Change dressing: [] Daily    [x] Every Other Day [] Three times per week   [] Once a week [] Do Not Change Dressing   [] Other:     Off-Loading:   [] Off-loading when [] walking  [] in bed [] sitting  [] Total non-weight bearing  [] Right Leg  [] Left Leg   [x] Assistive Device [] Walker [] Cane  [x] Wheelchair  [] Crutches   [] Surgical shoe    [] Podus Boot(s)   [x] Foam Boot(s)  [] Roll About    [] Cast Boot [] CROW Boot  [] Other:  Dietary:  [x] Increase Protein: examples ( Meat, cheese, eggs, greek yogurt, premier protein drink, fish, nuts )        Activity:  [x] Activity as tolerated:  [] Patient has no activity restrictions     [] Strict Bedrest: [] Remain off Work:     [] May return to full duty work:                                   [] Return to work with restrictions:             Return Appointment:    [] Nurse visit scheduled in *** day(s) or *** week(s)   [x] Return Appointment: With Dr. Hallie Lowery  in  [address] [place] [address])  [] Ordered tests:      Electronically signed on 1/19/2021     [address] [place] [address] Information: Should you experience any significant changes in your wound(s) or have questions about your wound care, please contact the [facility] Main at [address] [place] [address] 8:00 am - 4:30. If you need help with your wound outside these hours and cannot wait until we are again available, contact your PCP or go to the hospital emergency room. PLEASE NOTE: IF YOU ARE UNABLE TO OBTAIN WOUND SUPPLIES, CONTINUE TO USE THE SUPPLIES YOU HAVE AVAILABLE UNTIL YOU ARE ABLE TO REACH US. IT IS MOST IMPORTANT TO KEEP THE WOUND COVERED AT ALL TIMES.      Physician Signature:_______________________ Dr. Hallie Lowrey

## 2021-01-26 NOTE — WOUND CARE
01/26/21 1011   Wound Foot Right;Plantar #1   Date First Assessed/Time First Assessed: 01/12/21 0824   Present on Hospital Admission: Yes  Location: Foot  Wound Location Orientation: Right;Plantar  Wound Description: #1   Dressing/Treatment Gauze dressing/dressing sponge  (gentamicin)   Discharge Condition: Stable     Pain: 3    Ambulatory Status: Wheelchair     Discharge Destination: Home     Transportation: Car    Accompanied by: Self     Discharge instructions reviewed with Patient and copy or written instructions have been provided. All questions/concerns have been addressed at this time.

## 2021-01-26 NOTE — WOUND CARE
Wound Center  Progress Note    Subjective:   Patient is for follow up of LE ulcer(s). Patient is doing well. No concerns are reported. No difficulty or problems with wound care. Wound care is being performed by staff/pt and consists of dressing changes and offloading wound(s). No complaints of wound pain. There have been no changes in patient's medical history in the interim. ROS:  No fever or chills. No rash. No pain at site of wound    Objective:   General: NAD  Psych: Cooperative, no anxiety or depression  Neuro: Alert, oriented to person/place/situation. Otherwise nonfocal.  Extremities: Bilateral mild pitting edema is noted. Skin color is normal.   Vascular exam:  No gross changes in pedal pulses. Capillary refill is intact, <3sec. Dermatologic:  Skin color appears normal for patient. Skin turgor is normal. Dystrophic nails are seen on the feet bilaterally. Ulcer Description:   Measurement: in cm pre/post debridement:  1.7 x 1.0 x 0.4 / same inc depth to 0.5  Ulcer bed: Mixed Granular/Fibrotic    Periwound: Calloused, nontender  Exudate: Moderate amount Serous exudate  Odor:  -    Assessment:  Diabetes with foot ulcer  Ulcer R 5th met with fat layer    Plan:    Dressing: Gent gauze with foam doughnut pad Frequency: every other day. Did bring sneaker and did felt pads to offload 5th met due to    spastic reflexes and can not keep his foot still. X-ray report showed no osteo. Xray image does show erosive changes guillermina on the obl view with area of adjacent ulcer which is consistent with osteomyelitis. Plan is reviewed with patient who expresses understanding. Questions were answered. Patient is to follow up with me in 1 wk. Kwadwo Tafoya DPM        Ulcer assessment: Due to presence of necrotic tissue within the wound bed, ulcer requires debridement. Procedure: Debridement:   The indication for debridement was reviewed with patient.  Risks of procedure (bleeding, infection, pain) were discussed with patient and consent signed. Questions were answered    Subcutaneous excisional debridement   Indication: to remove necrotic tissue/ devitalized tissue/ soft eschar/ infected tissue/obtain deep wound culture through subcutaneous layer of wound bed  Consent in chart   Anesthesia: Topical 2% lidocaine jelly  Instrument: 15 Blade,    Residual Necrosis: Present and scored   Bleeding: <1ml   Hemostasis: Pressure   Patient tolerated procedure well   Procedural Pain: none  Post - procedural pain: none    Post debridement measurements: see progress note.   Surface area debrided: <20 sq. cm

## 2021-02-02 ENCOUNTER — HOSPITAL ENCOUNTER (OUTPATIENT)
Dept: WOUND CARE | Age: 41
Discharge: HOME | End: 2021-02-02
Admitting: PODIATRIST
Payer: COMMERCIAL

## 2021-02-02 VITALS
RESPIRATION RATE: 16 BRPM | HEART RATE: 83 BPM | SYSTOLIC BLOOD PRESSURE: 172 MMHG | TEMPERATURE: 97.1 F | DIASTOLIC BLOOD PRESSURE: 98 MMHG

## 2021-02-02 PROCEDURE — 11042 DBRDMT SUBQ TIS 1ST 20SQCM/<: CPT

## 2021-02-02 NOTE — WOUND CARE
Discharge Condition: Stable     Pain: 1    Ambulatory Status: Wheelchair     Discharge Destination: Home     Transportation: Car    Accompanied by: Self     Discharge instructions reviewed with Patient and copy or written instructions have been provided. All questions/concerns have been addressed at this time.

## 2021-02-02 NOTE — WOUND CARE
Wound Center  Progress Note    Subjective:   Patient is for follow up of LE ulcer(s). Patient is doing well. No concerns are reported. No difficulty or problems with wound care. Wound care is being performed by staff/pt and consists of dressing changes and offloading wound(s). No complaints of wound pain. There have been no changes in patient's medical history in the interim. ROS:  No fever or chills. No rash. No pain at site of wound    Objective:   General: NAD  Psych: Cooperative, no anxiety or depression  Neuro: Alert, oriented to person/place/situation. Otherwise nonfocal.  Extremities: Bilateral mild pitting edema is noted. Skin color is normal.   Vascular exam:  No gross changes in pedal pulses. Capillary refill is intact, <3sec. Dermatologic:  Skin color appears normal for patient. Skin turgor is normal. Dystrophic nails are seen on the feet bilaterally. Ulcer Description:   Measurement: in cm pre/post debridement:  1.8 x 1.0 x 0.4 / same inc depth to 0.5  Ulcer bed: Mixed Granular/Fibrotic    Periwound: Calloused, nontender  Exudate: Moderate amount Serous exudate  Odor:  -    Assessment:  Diabetes with foot ulcer  E11.621  Pritchard Grade III Ulcer R 5th met (probes bone) L97.514    Plan:    Dressing: Gent gauze with foam doughnut pad Frequency: every other day. Did bring sneaker and did felt pads to offload 5th met due to    spastic reflexes and can not keep his foot still. Pt's son could not apply the shoe. May bring a FFT offload shoe to [facility] next appointment. X-ray report from 1/12/21 showed no osteo by radiology read. Actual xray image however does show erosive changes guillermina on the obl view with area of adjacent lucency (ulcer) which is consistent with osteomyelitis. Plan is reviewed with patient who expresses understanding. Questions were answered. Patient is to follow up with me in 1 wk. Kwadwo Lindo DPM        Ulcer assessment: Due to presence of necrotic tissue within the wound bed, ulcer requires debridement. Procedure: Debridement:   The indication for debridement was reviewed with patient. Risks of procedure (bleeding, infection, pain) were discussed with patient and consent signed. Questions were answered    Subcutaneous excisional debridement   Indication: to remove necrotic tissue/ devitalized tissue/ soft eschar/ infected tissue/obtain deep wound culture through subcutaneous layer of wound bed  Consent in chart   Anesthesia: Topical 2% lidocaine jelly  Instrument: 15 Blade,    Residual Necrosis: Present and scored   Bleeding: <1ml   Hemostasis: Pressure   Patient tolerated procedure well   Procedural Pain: none  Post - procedural pain: none    Post debridement measurements: see progress note.   Surface area debrided: <20 sq. cm

## 2021-02-02 NOTE — WOUND CARE
02/02/21 0837   Right Leg Edema Point of Measurement   Leg circumference 29 cm   Ankle circumference 22 cm   RLE Peripheral Vascular    Capillary Refill Less than/equal to 3 seconds   Color Appropriate for race   Temperature Warm   Sensation Absent   Wound Foot Right;Plantar #1   Date First Assessed/Time First Assessed: 01/12/21 0824   Present on Hospital Admission: Yes  Location: Foot  Wound Location Orientation: Right;Plantar  Wound Description: #1   Wound Image    Wound Etiology Diabetic   Cleansed Cleansed with saline   Wound Length (cm) 1.8 cm   Wound Width (cm) 1 cm   Wound Depth (cm) 0.4 cm   Wound Surface Area (cm^2) 1.8 cm^2   Change in Wound Size % -5.88   Wound Volume (cm^3) 0.72 cm^3   Wound Healing % 15   Undermining Starts ___ O'Clock 11 o'clock   Undermining Maximum Distance (cm) 0.9 cm   Wound Assessment Slough;Pink/red   Drainage Amount Moderate   Drainage Description Sanguineous   Wound Odor None   Maribel-Wound/Incision Assessment Intact   Edges Flat/open edges     Visit Vitals  BP (!) 172/98 (BP 1 Location: Left upper arm, BP Patient Position: Sitting)   Pulse 83   Temp 97.1 °F (36.2 °C)   Resp 16

## 2021-02-02 NOTE — DISCHARGE INSTRUCTIONS
Discharge Instructions for  [facility]  [address] [place], [address] [facility]  Telephone: [phone]    NAME:  Phoebe Dutton  YOB: 1980  ACCOUNT NUMBER :  [de-identified]  DATE:  1/26/2021     HH: [place] [phone]    Wound Cleansing:   Do not scrub or use excessive force. Cleanse wound prior to applying a clean dressing with:      [x] Cleanse wound with: Osbaldo Parker and Madi baby shampoo lather leave 2-3 min then rinse with water      [] May Shower at Discharge     []  Shower on days of dressing change, remove dressing first    [] Keep Wound Dry in Shower (may purchase a cast cover if needed)     Topical Treatments:  Do not apply lotions, creams, or ointments to wound bed unless directed. [x] Apply moisturizing lotion A&D ointment to skin surrounding the wound prior to dressing change.  [] Apply antifungal ointment to skin surrounding the wound prior to dressing change.  [] Apply thin film of Zinc barrier ointment to skin immediately around wound. [x] Other: Gentamicin      Dressings:           Wound Location right plantar foot     [x] Apply Primary Dressing:       [] MediHoney Gel  [] Alginate   [] Alginate with Silver  [] Alginate        [] Collagen [] Collagen with Silver {brown collagen :34689}   []  Moisten saline gauze     [] Hydrocolloid   [] MediHoney Alginate [] Foam with Silver   [] donut foam cut out   [] Hydrofera Blue    [] Mepilex Border    [] Moisten with Saline [] Hydrogel [] Mepitel     [] Bactroban/Mupirocin [] Polysporin  [] Other:      [x] Cover and Secure with:     [x] Gauze [x] Héctor [] Kerlix   [] Ace Wrap [x] Cover Roll Tape [] ABD     [] Other:    Avoid contact of tape with skin.   [x] Change dressing: [] Daily    [x] Every Other Day [] Three times per week   [] Once a week [] Do Not Change Dressing   [] Other:     Off-Loading:   [] Off-loading when [] walking  [] in bed [] sitting  [] Total non-weight bearing  [] Right Leg  [] Left Leg   [x] Assistive Device [] Walker [] Cane  [x] Wheelchair  [] Crutches   [] Surgical shoe    [] Podus Boot(s)   [x] Foam Boot(s)  [] Roll About    [] Cast Boot [] CROW Boot  [x] Other: applied felt to shoe  Dietary:  [x] Increase Protein: examples ( Meat, cheese, eggs, greek yogurt, premier protein drink, fish, nuts )        Activity:  [x] Activity as tolerated:  [] Patient has no activity restrictions     [] Strict Bedrest: [] Remain off Work:     [] May return to full duty work:                                   [] Return to work with restrictions:             Return Appointment:    [] Nurse visit scheduled in *** day(s) or *** week(s)   [x] Return Appointment: With Dr. Elliot Palmer  in  [address] [place] [address])  [] Ordered tests:      Electronically signed on 1/26/2021     [address] [place] [address] Information: Should you experience any significant changes in your wound(s) or have questions about your wound care, please contact the [facility] Main at [address] [place] [address] 8:00 am - 4:30. If you need help with your wound outside these hours and cannot wait until we are again available, contact your PCP or go to the hospital emergency room. PLEASE NOTE: IF YOU ARE UNABLE TO OBTAIN WOUND SUPPLIES, CONTINUE TO USE THE SUPPLIES YOU HAVE AVAILABLE UNTIL YOU ARE ABLE TO REACH US. IT IS MOST IMPORTANT TO KEEP THE WOUND COVERED AT ALL TIMES.      Physician Signature:_______________________ Dr. Elliot Palmer

## 2021-02-09 ENCOUNTER — HOSPITAL ENCOUNTER (OUTPATIENT)
Dept: WOUND CARE | Age: 41
Discharge: HOME | End: 2021-02-09
Payer: COMMERCIAL

## 2021-02-09 VITALS — DIASTOLIC BLOOD PRESSURE: 80 MMHG | TEMPERATURE: 97.5 F | SYSTOLIC BLOOD PRESSURE: 125 MMHG | RESPIRATION RATE: 18 BRPM

## 2021-02-09 PROCEDURE — 11042 DBRDMT SUBQ TIS 1ST 20SQCM/<: CPT

## 2021-02-09 NOTE — WOUND CARE
Wound Center  Progress Note    Subjective:   Patient is for follow up of LE ulcer(s). Patient is doing well. No concerns are reported. No difficulty or problems with wound care. Wound care is being performed by staff/pt and consists of dressing changes and offloading wound(s). No complaints of wound pain. There have been no changes in patient's medical history in the interim. ROS:  No fever or chills. No rash. No pain at site of wound    Objective:   General: NAD  Psych: Cooperative, no anxiety or depression  Neuro: Alert, oriented to person/place/situation. Otherwise nonfocal.  Extremities: Bilateral mild pitting edema is noted. Skin color is normal.   Vascular exam:  No gross changes in pedal pulses. Capillary refill is intact, <3sec. Dermatologic:  Skin color appears normal for patient. Skin turgor is normal. Dystrophic nails are seen on the feet bilaterally. Ulcer Description:   Measurement: in cm pre/post debridement:  1.8 x 1.0 x 0.4 / same inc depth to 0.5  Ulcer bed: Mixed Granular/Fibrotic    Periwound: Calloused, nontender  Exudate: Moderate amount Serous exudate  Odor:  -    Assessment:  Diabetes with foot ulcer  E11.621  Pritchard Grade III Ulcer R 5th met (probes bone) L97.514    Plan:    Dressing: Gent gauze with foam doughnut pad Frequency: every other day. Did bring sneaker and did felt pads to offload 5th met due to    spastic reflexes and can not keep his foot still. Pt's son could not apply the shoe. He broght FFT offload shoe 7 I modified with both 5th met cut out and heel eversion pad. X-ray report from 1/12/21 showed no osteo by radiology read. Actual xray image however does show erosive changes guillermina on the obl view with area of adjacent lucency (ulcer) which is consistent with osteomyelitis. Order for CXR today and HBO work-up is being started. Plan is reviewed with patient who expresses understanding. Questions were answered.   Patient is to follow up with me in 1 wk. Kwadwo Davis DPM        Ulcer assessment: Due to presence of necrotic tissue within the wound bed, ulcer requires debridement. Procedure: Debridement:   The indication for debridement was reviewed with patient. Risks of procedure (bleeding, infection, pain) were discussed with patient and consent signed. Questions were answered    Subcutaneous excisional debridement   Indication: to remove necrotic tissue/ devitalized tissue/ soft eschar/ infected tissue/obtain deep wound culture through subcutaneous layer of wound bed  Consent in chart   Anesthesia: Topical 2% lidocaine jelly  Instrument: 15 Blade,    Residual Necrosis: Present and scored   Bleeding: <1ml   Hemostasis: Pressure   Patient tolerated procedure well   Procedural Pain: none  Post - procedural pain: none    Post debridement measurements: see progress note.   Surface area debrided: <20 sq. cm

## 2021-02-09 NOTE — WOUND CARE
02/09/21 0856   Wound Foot Right;Plantar #1   Date First Assessed/Time First Assessed: 01/12/21 0824   Present on Hospital Admission: Yes  Location: Foot  Wound Location Orientation: Right;Plantar  Wound Description: #1   Wound Image    Cleansed Cleansed with saline   Wound Length (cm) 1.7 cm   Wound Width (cm) 1.4 cm   Wound Depth (cm) 0.7 cm   Wound Surface Area (cm^2) 2.38 cm^2   Change in Wound Size % -40   Wound Volume (cm^3) 1.67 cm^3   Wound Healing % -96   Undermining Starts ___ O'Clock 3 o'clock   Undermining Ends ___ O'Clock 4 o'clock   Undermining Maximum Distance (cm) 0.5 cm   Wound Assessment [place]/red;Slough   Drainage Amount Moderate   Drainage Description Serosanguinous   Wound Odor None   Maribel-Wound/Incision Assessment Maceration   Edges Flat/open edges     Visit Vitals  /80 (BP 1 Location: Right lower arm, BP Patient Position: Sitting)   Temp 97.5 °F (36.4 °C)   Resp 18

## 2021-02-09 NOTE — WOUND CARE
02/09/21 0935   Wound Foot Right;Plantar #1   Date First Assessed/Time First Assessed: 01/12/21 0824   Present on Hospital Admission: Yes  Location: Foot  Wound Location Orientation: Right;Plantar  Wound Description: #1   Dressing/Treatment Collagen with Ag;Gauze dressing/dressing sponge   Discharge Condition: Stable     Pain: 0    Ambulatory Status: Wheelchair     Discharge Destination: Home     Transportation: Car    Accompanied by: Self     Discharge instructions reviewed with Patient and copy or written instructions have been provided. All questions/concerns have been addressed at this time.

## 2021-02-09 NOTE — DISCHARGE INSTRUCTIONS
Discharge Instructions for  [facility]  [address] [place], [address] [facility]  Telephone: [phone]    NAME:  Patrick Uriarte  YOB: 1980  ACCOUNT NUMBER :  [de-identified]  DATE:  2/2/2021     HH: [place] [phone]    Wound Cleansing:   Do not scrub or use excessive force. Cleanse wound prior to applying a clean dressing with:      [x] Cleanse wound with: Maday Dalal and Madi baby shampoo lather leave 2-3 min then rinse with water      [] May Shower at Discharge     []  Shower on days of dressing change, remove dressing first    [] Keep Wound Dry in Shower (may purchase a cast cover if needed)     Topical Treatments:  Do not apply lotions, creams, or ointments to wound bed unless directed. [x] Apply moisturizing lotion A&D ointment to skin surrounding the wound prior to dressing change.  [] Apply antifungal ointment to skin surrounding the wound prior to dressing change.  [] Apply thin film of Zinc barrier ointment to skin immediately around wound. [x] Other: Gentamicin      Dressings:           Wound Location right plantar foot     [x] Apply Primary Dressing:       [] MediHoney Gel  [] Alginate   [] Alginate with Silver  [] Alginate        [] Collagen [x] Collagen with Silver Bisi    []  Moisten saline gauze     [] Hydrocolloid   [] MediHoney Alginate [] Foam with Silver   [] donut foam cut out   [] Hydrofera Blue    [] Mepilex Border    [] Moisten with Saline [] Hydrogel [] Mepitel     [] Bactroban/Mupirocin [] Polysporin  [] Other:      [x] Cover and Secure with:     [x] Gauze [x] Héctor [] Kerlix   [] Ace Wrap [x] Cover Roll Tape [] ABD     [] Other:    Avoid contact of tape with skin.   [x] Change dressing: [] Daily    [x] Every Other Day [] Three times per week   [] Once a week [] Do Not Change Dressing   [] Other:     Off-Loading:   [] Off-loading when [] walking  [] in bed [] sitting  [] Total non-weight bearing  [] Right Leg  [] Left Leg   [x] Assistive Device [] Walker [] Cane  [x] Wheelchair  [] Crutches   [] Surgical shoe    [] Podus Boot(s)   [x] Foam Boot(s)  [] Roll About    [] Cast Boot [] CROW Boot  [x] Other: applied felt to shoe  Dietary:  [x] Increase Protein: examples ( Meat, cheese, eggs, greek yogurt, premier protein drink, fish, nuts )        Activity:  [x] Activity as tolerated:  [] Patient has no activity restrictions     [] Strict Bedrest: [] Remain off Work:     [] May return to full duty work:                                   [] Return to work with restrictions:             Return Appointment:    [] Nurse visit scheduled in *** day(s) or *** week(s)   [x] Return Appointment: With Dr. Dylan Richardson  in  [facility])  [] Ordered tests:      Electronically signed on 2/2/2021     [address] [place] [address] Information: Should you experience any significant changes in your wound(s) or have questions about your wound care, please contact the [facility] at [address] [place] [address] 8:00 am - 4:30. If you need help with your wound outside these hours and cannot wait until we are again available, contact your PCP or go to the hospital emergency room. PLEASE NOTE: IF YOU ARE UNABLE TO OBTAIN WOUND SUPPLIES, CONTINUE TO USE THE SUPPLIES YOU HAVE AVAILABLE UNTIL YOU ARE ABLE TO REACH US. IT IS MOST IMPORTANT TO KEEP THE WOUND COVERED AT ALL TIMES.      Physician Signature:_______________________ Dr. Dylan Richardson

## 2021-02-15 ENCOUNTER — TELEPHONE (OUTPATIENT)
Dept: WOUND CARE | Age: 41
End: 2021-02-15

## 2021-02-15 NOTE — TELEPHONE ENCOUNTER
New Patient Appointment Reminder and [phone] Screening     Have you been tested for or have you been around anyone that has confirmed or been suspected positive for Covid-19 in past 14 days? No      If yes then  CANCEL VISIT AND MAKE A VIRTUAL VISIT IF POSSIBLE  :    Stay home and monitor your health   Stay home for 14 days after your last contact with a person who has COVID-19    Watch for fever (100. 4? F), cough, shortness of breath, or other symptoms of COVID-19 advise CDC website for more information   If possible, stay away from others, especially people who are at higher risk for getting very sick from COVID-19    Does Patient have fever and/or lower respiratory symptoms, (shortness of breath, difficulty breathing, cough) loss taste or smell, sore throat, muscle or body aches, nasal congestion or runny nose, nausea/vomiting,diarrhea ? No     If yes then CANCEL VISIT AND MAKE A VIRTUAL VISIT IF POSSIBLE :    Referred to PCP or to the closest ED   No       Open travel questions and document patient responses. If No stop here and give patient reminder time for appointment and ask them please limit to having only 1 person accompany to appointment if necessary, no children under 18 are allowed at visits. Please call into office day of appointment to notify arrival.Remind all patients and caretakers they must wear a mask when entering into the wound clinic. Reminder Appointment  date and time given: Yes      [email or web address]    [email or web address]      ns and document patient responses. Not COVID-19. Proceed with scheduling  appointment    1. Provider instructs patient to travel directly to inpatient ED. 2. Provider to notify local ED of patients pending arrival.  3. Notify local health department and infection prevention immediately.   4. Obtain exposure list to include staff, others in waiting room and transport staff and  submit to infection prevention. Communication to the patient:   Due to your recent history and reported symptoms, we ask that you go to an emergency  department for further evaluation.  You should wear a facemask when you are in the same room with other people and  when you visit a health care provider. If you cannot wear a facemask, other practical  means of protection can be used, such as a wash cloth.  Cover your mouth and nose with a tissue when you cough or sneeze, or you can  cough or sneeze into your sleeve. Throw used tissues away, and immediately wash  your hands with soap and water for at least 20 seconds.  Wash your hands often and thoroughly with soap and water for at least 20 seconds. You can use an alcohol-based hand , if soap and water are not available.

## 2021-02-23 ENCOUNTER — HOSPITAL ENCOUNTER (OUTPATIENT)
Dept: WOUND CARE | Age: 41
Discharge: HOME | End: 2021-02-23
Payer: COMMERCIAL

## 2021-02-23 VITALS
SYSTOLIC BLOOD PRESSURE: 132 MMHG | TEMPERATURE: 96.6 F | RESPIRATION RATE: 18 BRPM | HEART RATE: 82 BPM | DIASTOLIC BLOOD PRESSURE: 76 MMHG

## 2021-02-23 PROCEDURE — 11042 DBRDMT SUBQ TIS 1ST 20SQCM/<: CPT

## 2021-02-23 NOTE — WOUND CARE
Discharge Condition: Stable     Pain: 4    Ambulatory Status: Wheelchair     Discharge Destination: Work     Transportation: Car    Accompanied by: Self     Discharge instructions reviewed with Patient and copy or written instructions have been provided. All questions/concerns have been addressed at this time.

## 2021-02-23 NOTE — WOUND CARE
02/23/21 0854   Right Leg Edema Point of Measurement   Leg circumference 29 cm   Ankle circumference 22 cm   RLE Peripheral Vascular    Capillary Refill Greater than 3 seconds   Color Appropriate for race   Temperature Warm   Pedal Pulse Palpable   Wound Foot Right;Plantar #1   Date First Assessed/Time First Assessed: 01/12/21 0824   Present on Hospital Admission: Yes  Location: Foot  Wound Location Orientation: Right;Plantar  Wound Description: #1   Wound Image    Wound Length (cm) 0.5 cm   Wound Width (cm) 0.4 cm   Wound Depth (cm) 0.3 cm   Wound Surface Area (cm^2) 0.2 cm^2   Change in Wound Size % 88.24   Wound Volume (cm^3) 0.06 cm^3   Wound Healing % 93   Wound Assessment Pink/red;Epithelialization   Drainage Amount Small   Drainage Description Serosanguinous   Wound Odor None   Maribel-Wound/Incision Assessment Intact   Pain 1   Pain Scale 1 Numeric (0 - 10)   Pain Intensity 1 4   Pain Location 1 Hand     Visit Vitals  /76   Pulse 82   Temp (!) 96.6 °F (35.9 °C)   Resp 18

## 2021-02-23 NOTE — WOUND CARE
Wound Center  Progress Note    Subjective:   Patient is for follow up of LE ulcer(s).     Patient is doing well.  No concerns are reported.  No difficulty or problems with wound care.  Wound care is being performed by staff/pt and consists of dressing changes and offloading wound(s).  No complaints of wound pain.    There have been no changes in patient's medical history in the interim.    ROS:  No fever or chills. No rash. No pain at site of wound    Objective:   General: NAD  Psych: Cooperative, no anxiety or depression  Neuro: Alert, oriented to person/place/situation. Otherwise nonfocal.  Extremities: Bilateral mild pitting edema is noted.    Skin color is normal.   Vascular exam:  No gross changes in pedal pulses.   Capillary refill is intact, <3sec.  Dermatologic:  Skin color appears normal for patient.  Skin turgor is normal. Dystrophic nails are seen on the feet bilaterally.    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  Cont Sx shoe modified with both 5th met cut out and heel eversion pad.    X-ray report from 1/12/21 showed no osteo by radiology read.  Actual xray image however does show erosive changes guillermina on the obl view with area of adjacent lucency (ulcer) which is consistent with osteomyelitis.     HBO work-up is still in progress.  Wound size improved but still has depth to bone and HBO is still medically necessary without soft-tissue coverage of bone.          Plan is reviewed with patient who expresses understanding.  Questions were answered.  Patient is to follow up with me in 1 wk.    Kwadwo Mckeon DPM        Ulcer assessment: Due  to presence of necrotic tissue within the wound bed, ulcer requires debridement. Procedure: Debridement:   The indication for debridement was reviewed with patient. Risks of procedure (bleeding, infection, pain) were discussed with patient and consent signed. Questions were answered    Subcutaneous excisional debridement   Indication: to remove necrotic tissue/ devitalized tissue/ soft eschar/ infected tissue/obtain deep wound culture through subcutaneous layer of wound bed  Consent in chart   Anesthesia: Topical 2% lidocaine jelly  Instrument: 15 Blade,    Residual Necrosis: Present and scored   Bleeding: <1ml   Hemostasis: Pressure   Patient tolerated procedure well   Procedural Pain: none  Post - procedural pain: none    Post debridement measurements: see progress note.   Surface area debrided: <20 sq. cm

## 2021-02-23 NOTE — DISCHARGE INSTRUCTIONS
Discharge Instructions for  [facility]  [address] [place], [address] [facility]  Telephone: [phone]    NAME:  Elan Lofton  YOB: 1980  ACCOUNT NUMBER :  [de-identified]  DATE:  2/9/2021     HH: [facility] [phone]    Wound Cleansing:   Do not scrub or use excessive force. Cleanse wound prior to applying a clean dressing with:      [x] Cleanse wound with: Korin Anirudh and Madi baby shampoo lather leave 2-3 min then rinse with water      [] May Shower at Discharge     []  Shower on days of dressing change, remove dressing first    [] Keep Wound Dry in Shower (may purchase a cast cover if needed)     Topical Treatments:  Do not apply lotions, creams, or ointments to wound bed unless directed. [x] Apply moisturizing lotion A&D ointment to skin surrounding the wound prior to dressing change.  [] Apply antifungal ointment to skin surrounding the wound prior to dressing change.  [] Apply thin film of Zinc barrier ointment to skin immediately around wound. [x] Other: Gentamicin      Dressings:           Wound Location right plantar foot     [x] Apply Primary Dressing:       [] MediHoney Gel  [] Alginate   [] Alginate with Silver  [] Alginate        [] Collagen [x] Collagen with Silver Bisi    []  Moisten saline gauze     [] Hydrocolloid   [] MediHoney Alginate [] Foam with Silver   [] donut foam cut out   [] Hydrofera Blue    [] Mepilex Border    [] Moisten with Saline [] Hydrogel [] Mepitel     [] Bactroban/Mupirocin [] Polysporin  [x] Other:  Betadine to michelle wound  [x] Cover and Secure with:     [x] Gauze [x] Héctor [] Kerlix   [] Ace Wrap [x] Cover Roll Tape [] ABD     [] Other:    Avoid contact of tape with skin.   [x] Change dressing: [] Daily    [x] Every Other Day [] Three times per week   [] Once a week [] Do Not Change Dressing   [] Other:     Off-Loading:   [] Off-loading when [] walking  [] in bed [] sitting  [] Total non-weight bearing [] Right Leg  [] Left Leg   [x] Assistive Device [] Walker [] Cane  [x] Wheelchair  [] Crutches   [] Surgical shoe    [] Podus Boot(s)   [x] Foam Boot(s)  [] Roll About    [] Cast Boot [] CROW Boot  [x] Other: applied felt to shoe  Dietary:  [x] Increase Protein: examples ( Meat, cheese, eggs, greek yogurt, premier protein drink, fish, nuts )        Activity:  [x] Activity as tolerated:  [] Patient has no activity restrictions     [] Strict Bedrest: [] Remain off Work:     [] May return to full duty work:                                   [] Return to work with restrictions:             Return Appointment:    [] Nurse visit scheduled in *** day(s) or *** week(s)   [x] Return Appointment: With Dr. Chelsy Michaud  in  [facility] [employer])  [] Ordered tests:      Electronically signed on 2/9/2021     [address] [place] [address] Information: Should you experience any significant changes in your wound(s) or have questions about your wound care, please contact the [address] [place] [address] at [address] [place] [address] 8:00 am - 4:30. If you need help with your wound outside these hours and cannot wait until we are again available, contact your PCP or go to the hospital emergency room. PLEASE NOTE: IF YOU ARE UNABLE TO OBTAIN WOUND SUPPLIES, CONTINUE TO USE THE SUPPLIES YOU HAVE AVAILABLE UNTIL YOU ARE ABLE TO REACH US. IT IS MOST IMPORTANT TO KEEP THE WOUND COVERED AT ALL TIMES.      Physician Signature:_______________________ Dr. Chelsy Michaud

## 2021-03-02 ENCOUNTER — HOSPITAL ENCOUNTER (OUTPATIENT)
Dept: WOUND CARE | Age: 41
Discharge: HOME | End: 2021-03-02
Payer: COMMERCIAL

## 2021-03-02 VITALS
RESPIRATION RATE: 16 BRPM | TEMPERATURE: 97.2 F | SYSTOLIC BLOOD PRESSURE: 126 MMHG | DIASTOLIC BLOOD PRESSURE: 90 MMHG | HEART RATE: 62 BPM

## 2021-03-02 PROCEDURE — 11042 DBRDMT SUBQ TIS 1ST 20SQCM/<: CPT

## 2021-03-02 NOTE — WOUND CARE
Wound Center  Progress Note    Subjective:   Patient is for follow up of LE ulcer(s). Patient is doing well. No concerns are reported. No difficulty or problems with wound care. Wound care is being performed by staff/pt and consists of dressing changes and offloading wound(s). No complaints of wound pain. There have been no changes in patient's medical history in the interim. ROS:  No fever or chills. No rash. No pain at site of wound    Objective:   General: NAD  Psych: Cooperative, no anxiety or depression  Neuro: Alert, oriented to person/place/situation. Otherwise nonfocal.  Extremities: Bilateral mild pitting edema is noted. Skin color is normal.   Vascular exam:  No gross changes in pedal pulses. Capillary refill is intact, <3sec. Dermatologic:  Skin color appears normal for patient. Skin turgor is normal. Dystrophic nails are seen on the feet bilaterally. 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 Cont Sx shoe modified with both 5th met cut out and heel eversion pad. X-ray report from 1/12/21 showed no osteo by radiology read. Actual xray image however does show erosive changes guillermina on the obl view with area of adjacent lucency (ulcer) which is consistent with osteomyelitis. HBO work-up is a no go with medical issues. Plan is reviewed with patient who expresses understanding. Questions were answered. Patient is to follow up with me in 1 wk. Kwadwo Thomas DPM        Ulcer assessment: Due to presence of necrotic tissue within the wound bed, ulcer requires debridement. Procedure: Debridement:    The indication for debridement was reviewed with patient. Risks of procedure (bleeding, infection, pain) were discussed with patient and consent signed. Questions were answered    Subcutaneous excisional debridement   Indication: to remove necrotic tissue/ devitalized tissue/ soft eschar/ infected tissue/obtain deep wound culture through subcutaneous layer of wound bed  Consent in chart   Anesthesia: Topical 2% lidocaine jelly  Instrument: 15 Blade,    Residual Necrosis: Present and scored   Bleeding: <1ml   Hemostasis: Pressure   Patient tolerated procedure well   Procedural Pain: none  Post - procedural pain: none    Post debridement measurements: see progress note.   Surface area debrided: <20 sq. cm

## 2021-03-02 NOTE — WOUND CARE
03/02/21 0915   Wound Foot Right;Plantar #1   Date First Assessed/Time First Assessed: 01/12/21 0824   Present on Hospital Admission: Yes  Location: Foot  Wound Location Orientation: Right;Plantar  Wound Description: #1   Dressing/Treatment Collagen with Ag;Gauze dressing/dressing sponge  (gentamicin)   Discharge Condition: Stable     Pain: 0    Ambulatory Status: Wheelchair     Discharge Destination: Home     Transportation: Car    Accompanied by: Self     Discharge instructions reviewed with Patient and copy or written instructions have been provided. All questions/concerns have been addressed at this time.

## 2021-03-02 NOTE — WOUND CARE
03/02/21 0850   Right Leg Edema Point of Measurement   Leg circumference 29 cm   Ankle circumference 23 cm   RLE Peripheral Vascular    Capillary Refill Less than/equal to 3 seconds   Color Appropriate for race   Temperature Warm   Sensation Absent   Post-tibial Pulse Palpable   Pedal Pulse Palpable   Wound Foot Right;Plantar #1   Date First Assessed/Time First Assessed: 01/12/21 0824   Present on Hospital Admission: Yes  Location: Foot  Wound Location Orientation: Right;Plantar  Wound Description: #1   Wound Image    Cleansed Cleansed with saline   Wound Length (cm) 0.5 cm   Wound Width (cm) 0.2 cm   Wound Depth (cm) 0.5 cm   Wound Surface Area (cm^2) 0.1 cm^2   Change in Wound Size % 94.12   Wound Volume (cm^3) 0.05 cm^3   Wound Healing % 94   Undermining Starts ___ O'Clock 12 o'clock   Undermining Ends ___ O'Clock 2 o'clock   Undermining Maximum Distance (cm) 0.3 cm   Wound Assessment [place]/red;Slough   Drainage Amount Small   Drainage Description Serosanguinous   Wound Odor None   Maribel-Wound/Incision Assessment Intact   Edges Flat/open edges     Visit Vitals  BP (!) 126/90 (BP 1 Location: Right upper arm, BP Patient Position: At rest)   Pulse 62   Temp 97.2 °F (36.2 °C)   Resp 16

## 2021-03-02 NOTE — DISCHARGE INSTRUCTIONS
Discharge Instructions for  [facility]  [place] [address], [address] [facility]  Telephone: [phone]    NAME:  Coretta Wallace  YOB: 1980  ACCOUNT NUMBER :  [de-identified]  DATE:  2/23/2021     HH: [facility] [phone]    Wound Cleansing:   Do not scrub or use excessive force. Cleanse wound prior to applying a clean dressing with:      [x] Cleanse wound with: [place] and Madi baby shampoo lather leave 2-3 min then rinse with water      [] May Shower at Discharge     []  Shower on days of dressing change, remove dressing first    [] Keep Wound Dry in Shower (may purchase a cast cover if needed)     Topical Treatments:  Do not apply lotions, creams, or ointments to wound bed unless directed. [x] Apply moisturizing lotion A&D ointment to skin surrounding the wound prior to dressing change.  [] Apply antifungal ointment to skin surrounding the wound prior to dressing change.  [] Apply thin film of Zinc barrier ointment to skin immediately around wound. [x] Other: Gentamicin      Dressings:           Wound Location right plantar foot     [x] Apply Primary Dressing:       [] MediHoney Gel  [] Alginate   [] Alginate with Silver  [] Alginate        [] Collagen [x] Collagen with Silver Bisi    []  Moisten saline gauze     [] Hydrocolloid   [] MediHoney Alginate [] Foam with Silver   [] donut foam cut out   [] Hydrofera Blue    [] Mepilex Border    [] Moisten with Saline [] Hydrogel [] Mepitel     [] Bactroban/Mupirocin [] Polysporin  [x] Other:  Betadine to michelle wound  [x] Cover and Secure with:     [x] Gauze [x] Héctor [] Kerlix   [] Ace Wrap [x] Cover Roll Tape [] ABD     [] Other:    Avoid contact of tape with skin.   [x] Change dressing: [] Daily    [x] Every Other Day [] Three times per week   [] Once a week [] Do Not Change Dressing   [] Other:     Off-Loading:   [] Off-loading when [] walking  [] in bed [] sitting  [] Total non-weight bearing [] Right Leg  [] Left Leg   [x] Assistive Device [] Walker [] Cane  [x] Wheelchair  [] Crutches   [] Surgical shoe    [] Podus Boot(s)   [x] Foam Boot(s)  [] Roll About    [] Cast Boot [] CROW Boot  [x] Other: applied felt to shoe  Dietary:  [x] Increase Protein: examples ( Meat, cheese, eggs, greek yogurt, premier protein drink, fish, nuts )        Activity:  [x] Activity as tolerated:  [] Patient has no activity restrictions     [] Strict Bedrest: [] Remain off Work:     [] May return to full duty work:                                   [] Return to work with restrictions:             Return Appointment:    [] Nurse visit scheduled in *** day(s) or *** week(s)   [x] Return Appointment: With Dr. Hi Jeffries  in  1 [facility])  [] Ordered tests:      Electronically signed on 2/23/2021     [address] [place] [address] Information: Should you experience any significant changes in your wound(s) or have questions about your wound care, please contact the [facility] at [address] [place] [address] 8:00 am - 4:30. If you need help with your wound outside these hours and cannot wait until we are again available, contact your PCP or go to the hospital emergency room. PLEASE NOTE: IF YOU ARE UNABLE TO OBTAIN WOUND SUPPLIES, CONTINUE TO USE THE SUPPLIES YOU HAVE AVAILABLE UNTIL YOU ARE ABLE TO REACH US. IT IS MOST IMPORTANT TO KEEP THE WOUND COVERED AT ALL TIMES.      Physician Signature:_______________________ Dr. Hi Jeffries

## 2021-03-09 ENCOUNTER — HOSPITAL ENCOUNTER (OUTPATIENT)
Dept: WOUND CARE | Age: 41
Discharge: HOME | End: 2021-03-09
Payer: COMMERCIAL

## 2021-03-09 VITALS
SYSTOLIC BLOOD PRESSURE: 130 MMHG | DIASTOLIC BLOOD PRESSURE: 85 MMHG | HEART RATE: 76 BPM | TEMPERATURE: 97.3 F | RESPIRATION RATE: 16 BRPM

## 2021-03-09 PROCEDURE — 11042 DBRDMT SUBQ TIS 1ST 20SQCM/<: CPT

## 2021-03-09 NOTE — WOUND CARE
03/09/21 1013   Wound Foot Right;Plantar #1   Date First Assessed/Time First Assessed: 01/12/21 0824   Present on Hospital Admission: Yes  Location: Foot  Wound Location Orientation: Right;Plantar  Wound Description: #1   Dressing/Treatment   (gentamicin gauze tape)   Discharge Condition: Stable     Pain: 0    Ambulatory Status: Walking    Discharge Destination: Home     Transportation: Car    Accompanied by: Self     Discharge instructions reviewed with Patient and copy or written instructions have been provided. All questions/concerns have been addressed at this time.

## 2021-03-09 NOTE — WOUND CARE
Wound Center  Progress Note    Subjective:   Patient is for follow up of LE ulcer(s). Patient is doing well. No concerns are reported. No difficulty or problems with wound care. Wound care is being performed by staff/pt and consists of dressing changes and offloading wound(s). No complaints of wound pain. There have been no changes in patient's medical history in the interim. ROS:  No fever or chills. No rash. No pain at site of wound    Objective:   General: NAD  Psych: Cooperative, no anxiety or depression  Neuro: Alert, oriented to person/place/situation. Otherwise nonfocal.  Extremities: Bilateral mild pitting edema is noted. Skin color is normal.   Vascular exam:  No gross changes in pedal pulses. Capillary refill is intact, <3sec. Dermatologic:  Skin color appears normal for patient. Skin turgor is normal. Dystrophic nails are seen on the feet bilaterally. 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 Cont Sx shoe modified with both 5th met cut out and heel eversion pad. He did have cowboy boots on today and strictly advised against this - to only wear the Sx shoe. X-ray report from 1/12/21 showed no osteo by radiology read. Actual xray image however does show erosive changes guillermina on the obl view with area of adjacent lucency (ulcer) which is consistent with osteomyelitis. HBO work-up is a no go with medical issues. Plan is reviewed with patient who expresses understanding. Questions were answered. Patient is to follow up with me in 1 wk. Kwadwo Woods DPM        Ulcer assessment: Due to presence of necrotic tissue within the wound bed, ulcer requires debridement. Procedure: Debridement:   The indication for debridement was reviewed with patient. Risks of procedure (bleeding, infection, pain) were discussed with patient and consent signed. Questions were answered    Subcutaneous excisional debridement   Indication: to remove necrotic tissue/ devitalized tissue/ soft eschar/ infected tissue/obtain deep wound culture through subcutaneous layer of wound bed  Consent in chart   Anesthesia: Topical 2% lidocaine jelly  Instrument: 15 Blade,    Residual Necrosis: Present and scored   Bleeding: <1ml   Hemostasis: Pressure   Patient tolerated procedure well   Procedural Pain: none  Post - procedural pain: none    Post debridement measurements: see progress note.   Surface area debrided: <20 sq. cm

## 2021-03-09 NOTE — DISCHARGE INSTRUCTIONS
Discharge Instructions for  [facility] [address]  [address] [place], [address] [facility]  Telephone: [phone]    NAME:  Mary Krishna  YOB: 1980  ACCOUNT NUMBER :  [de-identified]  DATE:  3/2/2021     HH: [facility] [phone]    Wound Cleansing:   Do not scrub or use excessive force. Cleanse wound prior to applying a clean dressing with:      [x] Cleanse wound with: Connee Si and Madi baby shampoo lather leave 2-3 min then rinse with water      [] May Shower at Discharge     []  Shower on days of dressing change, remove dressing first    [] Keep Wound Dry in Shower (may purchase a cast cover if needed)     Topical Treatments:  Do not apply lotions, creams, or ointments to wound bed unless directed. [x] Apply moisturizing lotion A&D ointment to skin surrounding the wound prior to dressing change.  [] Apply antifungal ointment to skin surrounding the wound prior to dressing change.  [] Apply thin film of Zinc barrier ointment to skin immediately around wound. [x] Other: Gentamicin      Dressings:           Wound Location right plantar foot     [x] Apply Primary Dressing:       [] MediHoney Gel  [] Alginate   [] Alginate with Silver  [] Alginate        [] Collagen [x] Collagen with Silver Bisi    []  Moisten saline gauze     [] Hydrocolloid   [] MediHoney Alginate [] Foam with Silver   [] donut foam cut out   [] Hydrofera Blue    [] Mepilex Border    [] Moisten with Saline [] Hydrogel [] Mepitel     [] Bactroban/Mupirocin [] Polysporin  [] Other:  Betadine to michelle wound  [x] Cover and Secure with:     [x] Gauze [x] Héctor [] Kerlix   [] Ace Wrap [x] Cover Roll Tape [] ABD     [] Other:    Avoid contact of tape with skin.   [x] Change dressing: [] Daily    [x] Every Other Day [] Three times per week   [] Once a week [] Do Not Change Dressing   [] Other:     Off-Loading:   [] Off-loading when [] walking  [] in bed [] sitting  [] Total non-weight bearing [] Right Leg  [] Left Leg   [x] Assistive Device [] Walker [] Cane  [x] Wheelchair  [] Crutches   [] Surgical shoe    [] Podus Boot(s)   [x] Foam Boot(s)  [] Roll About    [] Cast Boot [] CROW Boot  [x] Other: applied felt to shoe  Dietary:  [x] Increase Protein: examples ( Meat, cheese, eggs, greek yogurt, premier protein drink, fish, nuts )        Activity:  [x] Activity as tolerated:  [] Patient has no activity restrictions     [] Strict Bedrest: [] Remain off Work:     [] May return to full duty work:                                   [] Return to work with restrictions:             Return Appointment:    [] Nurse visit scheduled in *** day(s) or *** week(s)   [x] Return Appointment: With Dr. Kimberlee Degroot  in  1 [facility])  [] Ordered tests:      Electronically signed on 3/2/2021     [address] [facility] Information: Should you experience any significant changes in your wound(s) or have questions about your wound care, please contact the [facility] Main at [address] [place] [address] 8:00 am - 4:30. If you need help with your wound outside these hours and cannot wait until we are again available, contact your PCP or go to the hospital emergency room. PLEASE NOTE: IF YOU ARE UNABLE TO OBTAIN WOUND SUPPLIES, CONTINUE TO USE THE SUPPLIES YOU HAVE AVAILABLE UNTIL YOU ARE ABLE TO REACH US. IT IS MOST IMPORTANT TO KEEP THE WOUND COVERED AT ALL TIMES.      Physician Signature:_______________________ Dr. Kimberlee Degroot

## 2021-03-09 NOTE — WOUND CARE
03/09/21 0925   Right Leg Edema Point of Measurement   Leg circumference 26 cm   Ankle circumference 23.5 cm   Wound Foot Right;Plantar #1   Date First Assessed/Time First Assessed: 01/12/21 0824   Present on Hospital Admission: Yes  Location: Foot  Wound Location Orientation: Right;Plantar  Wound Description: #1   Wound Image    Cleansed Cleansed with saline   Wound Length (cm) 0.5 cm   Wound Width (cm) 0.23 cm   Wound Depth (cm) 0.2 cm   Wound Surface Area (cm^2) 0.12 cm^2   Change in Wound Size % 92.94   Wound Volume (cm^3) 0.02 cm^3   Wound Healing % 98   Wound Assessment Other (Comment)  (dried exudate)   Drainage Amount None   Wound Odor None       Visit Vitals  /85 (BP 1 Location: Right upper arm, BP Patient Position: At rest)   Pulse 76   Temp 97.3 °F (36.3 °C)   Resp 16

## 2021-03-16 ENCOUNTER — HOSPITAL ENCOUNTER (OUTPATIENT)
Dept: WOUND CARE | Age: 41
Discharge: HOME | End: 2021-03-16
Payer: MEDICARE

## 2021-03-16 VITALS
RESPIRATION RATE: 18 BRPM | DIASTOLIC BLOOD PRESSURE: 81 MMHG | SYSTOLIC BLOOD PRESSURE: 121 MMHG | HEART RATE: 73 BPM | TEMPERATURE: 97.9 F

## 2021-03-16 PROCEDURE — 11042 DBRDMT SUBQ TIS 1ST 20SQCM/<: CPT

## 2021-03-16 NOTE — WOUND CARE
03/16/21 1008   Wound Foot Right;Plantar #1   Date First Assessed/Time First Assessed: 01/12/21 0824   Present on Hospital Admission: Yes  Location: Foot  Wound Location Orientation: Right;Plantar  Wound Description: #1   Dressing/Treatment Collagen with Ag;Gauze dressing/dressing sponge  (gentamicin ointment )   Discharge Condition: Stable     Pain: 4    Ambulatory Status: Wheelchair     Discharge Destination: Home     Transportation: Car    Accompanied by: Self     Discharge instructions reviewed with Patient and copy or written instructions have been provided. All questions/concerns have been addressed at this time.

## 2021-03-16 NOTE — WOUND CARE
03/16/21 0934   Right Leg Edema Point of Measurement   Leg circumference 32 cm   Ankle circumference 22 cm   RLE Peripheral Vascular    Capillary Refill Less than/equal to 3 seconds   Color Appropriate for race   Temperature Warm   Pedal Pulse Palpable   Wound Foot Right;Plantar #1   Date First Assessed/Time First Assessed: 01/12/21 0824   Present on Hospital Admission: Yes  Location: Foot  Wound Location Orientation: Right;Plantar  Wound Description: #1   Wound Image    Wound Length (cm) 0.1 cm   Wound Width (cm) 0.1 cm   Wound Depth (cm) 0.1 cm   Wound Surface Area (cm^2) 0.01 cm^2   Change in Wound Size % 99.41   Wound Volume (cm^3) 0 cm^3   Wound Healing % 100   Wound Assessment Other (Comment)  (calloused over)   Drainage Amount None   Wound Odor None     Visit Vitals  /81   Pulse 73   Temp 97.9 °F (36.6 °C)   Resp 18

## 2021-03-16 NOTE — DISCHARGE INSTRUCTIONS
Discharge Instructions for  [facility]  [address] [place], [address] [facility]  Telephone: [phone]    NAME:  Nancy Mcclure  YOB: 1980  ACCOUNT NUMBER :  [de-identified]  DATE:  3/9/2021     HH: [facility] [phone]    Wound Cleansing:   Do not scrub or use excessive force. Cleanse wound prior to applying a clean dressing with:      [x] Cleanse wound with: Cynthia Shay and Madi baby shampoo lather leave 2-3 min then rinse with water      [] May Shower at Discharge     []  Shower on days of dressing change, remove dressing first    [] Keep Wound Dry in Shower (may purchase a cast cover if needed)     Topical Treatments:  Do not apply lotions, creams, or ointments to wound bed unless directed. [x] Apply moisturizing lotion A&D ointment to skin surrounding the wound prior to dressing change.  [] Apply antifungal ointment to skin surrounding the wound prior to dressing change.  [] Apply thin film of Zinc barrier ointment to skin immediately around wound. [x] Other: Gentamicin      Dressings:           Wound Location right plantar foot     [x] Apply Primary Dressing:       [] MediHoney Gel  [] Alginate   [] Alginate with Silver  [] Alginate        [] Collagen [x] Collagen with Silver Bisi    []  Moisten saline gauze     [] Hydrocolloid   [] MediHoney Alginate [] Foam with Silver   [] donut foam cut out   [] Hydrofera Blue    [] Mepilex Border    [] Moisten with Saline [] Hydrogel [] Mepitel     [] Bactroban/Mupirocin [] Polysporin  [] Other:  Betadine to michelle wound  [x] Cover and Secure with:     [x] Gauze [x] Héctor [] Kerlix   [] Ace Wrap [x] Cover Roll Tape [] ABD     [] Other:    Avoid contact of tape with skin.   [x] Change dressing: [] Daily    [x] Every Other Day [] Three times per week   [] Once a week [] Do Not Change Dressing   [] Other:     Off-Loading:   [] Off-loading when [] walking  [] in bed [] sitting  [] Total non-weight bearing [] Right Leg  [] Left Leg   [x] Assistive Device [] Walker [] Cane  [x] Wheelchair  [] Crutches   [] Surgical shoe    [] Podus Boot(s)   [x] Foam Boot(s)  [] Roll About    [] Cast Boot [] CROW Boot  [x] Other: applied felt to shoe  Dietary:  [x] Increase Protein: examples ( Meat, cheese, eggs, greek yogurt, premier protein drink, fish, nuts )        Activity:  [x] Activity as tolerated:  [] Patient has no activity restrictions     [] Strict Bedrest: [] Remain off Work:     [] May return to full duty work:                                   [] Return to work with restrictions:             Return Appointment:    [] Nurse visit scheduled in *** day(s) or *** week(s)   [x] Return Appointment: With Dr. Weston Soto  in  1 [facility])  [] Ordered tests:      Electronically signed on 3/9/2021     [address] [facility] [address] Information: Should you experience any significant changes in your wound(s) or have questions about your wound care, please contact the [facility] Main at [address] [place] [address] 8:00 am - 4:30. If you need help with your wound outside these hours and cannot wait until we are again available, contact your PCP or go to the hospital emergency room. PLEASE NOTE: IF YOU ARE UNABLE TO OBTAIN WOUND SUPPLIES, CONTINUE TO USE THE SUPPLIES YOU HAVE AVAILABLE UNTIL YOU ARE ABLE TO REACH US. IT IS MOST IMPORTANT TO KEEP THE WOUND COVERED AT ALL TIMES.      Physician Signature:_______________________ Dr. Weston Soto

## 2021-03-16 NOTE — WOUND CARE
Wound Center  Progress Note    Subjective:   Patient is for follow up of LE ulcer(s). Patient is doing well. No concerns are reported. No difficulty or problems with wound care. Wound care is being performed by staff/pt and consists of dressing changes and offloading wound(s). No complaints of wound pain. There have been no changes in patient's medical history in the interim. ROS:  No fever or chills. No rash. No pain at site of wound    Objective:   General: NAD  Psych: Cooperative, no anxiety or depression  Neuro: Alert, oriented to person/place/situation. Otherwise nonfocal.  Extremities: Bilateral mild pitting edema is noted. Skin color is normal.   Vascular exam:  No gross changes in pedal pulses. Capillary refill is intact, <3sec. Dermatologic:  Skin color appears normal for patient. Skin turgor is normal. Dystrophic nails are seen on the feet bilaterally. 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 Cont Sx shoe modified with both 5th met cut out and heel eversion pad. X-ray report from 1/12/21 showed no osteo by radiology read. Actual xray image however does show erosive changes guillermina on the obl view with area of adjacent lucency (ulcer) which is consistent with osteomyelitis. HBO work-up is a no go with medical issues. Plan is reviewed with patient who expresses understanding. Questions were answered. Patient is to follow up with me in 1 wk. Mark A. Aleene Hashimoto, DPM        Ulcer assessment: Due to presence of necrotic tissue within the wound bed, ulcer requires debridement. Procedure: Debridement:    The indication for debridement was reviewed with patient. Risks of procedure (bleeding, infection, pain) were discussed with patient and consent signed. Questions were answered    Subcutaneous excisional debridement   Indication: to remove necrotic tissue/ devitalized tissue/ soft eschar/ infected tissue/obtain deep wound culture through subcutaneous layer of wound bed  Consent in chart   Anesthesia: Topical 2% lidocaine jelly  Instrument: 15 Blade,    Residual Necrosis: Present and scored   Bleeding: <1ml   Hemostasis: Pressure   Patient tolerated procedure well   Procedural Pain: none  Post - procedural pain: none    Post debridement measurements: see progress note.   Surface area debrided: <20 sq. cm

## 2021-03-23 ENCOUNTER — HOSPITAL ENCOUNTER (OUTPATIENT)
Dept: WOUND CARE | Age: 41
Discharge: HOME | End: 2021-03-23
Payer: MEDICARE

## 2021-03-23 VITALS
RESPIRATION RATE: 16 BRPM | TEMPERATURE: 97.2 F | HEART RATE: 64 BPM | DIASTOLIC BLOOD PRESSURE: 120 MMHG | SYSTOLIC BLOOD PRESSURE: 205 MMHG

## 2021-03-23 PROCEDURE — 11042 DBRDMT SUBQ TIS 1ST 20SQCM/<: CPT

## 2021-03-23 NOTE — DISCHARGE INSTRUCTIONS
Discharge Instructions for  [facility]  [address] [place], [address] [facility]  Telephone: [phone]    NAME:  Sheri Stinson  YOB: 1980  ACCOUNT NUMBER :  [de-identified]  DATE:  3/16/2021     HH: [facility] [phone]    Wound Cleansing:   Do not scrub or use excessive force. Cleanse wound prior to applying a clean dressing with:      [x] Cleanse wound with: Susannah Ma and Madi baby shampoo lather leave 2-3 min then rinse with water      [] May Shower at Discharge     []  Shower on days of dressing change, remove dressing first    [] Keep Wound Dry in Shower (may purchase a cast cover if needed)     Topical Treatments:  Do not apply lotions, creams, or ointments to wound bed unless directed. [x] Apply moisturizing lotion A&D ointment to skin surrounding the wound prior to dressing change.  [] Apply antifungal ointment to skin surrounding the wound prior to dressing change.  [] Apply thin film of Zinc barrier ointment to skin immediately around wound. [x] Other: Gentamicin      Dressings:           Wound Location right plantar foot     [x] Apply Primary Dressing:       [] MediHoney Gel  [] Alginate   [] Alginate with Silver  [] Alginate        [] Collagen [x] Collagen with Silver Bisi    []  Moisten saline gauze     [] Hydrocolloid   [] MediHoney Alginate [] Foam with Silver   [] donut foam cut out   [] Hydrofera Blue    [] Mepilex Border    [] Moisten with Saline [] Hydrogel [] Mepitel     [] Bactroban/Mupirocin [] Polysporin  [] Other:  Betadine to michelle wound  [x] Cover and Secure with:     [x] Gauze [x] Héctor [] Kerlix   [] Ace Wrap [x] Cover Roll Tape [] ABD     [] Other:    Avoid contact of tape with skin.   [x] Change dressing: [] Daily    [x] Every Other Day [] Three times per week   [] Once a week [] Do Not Change Dressing   [] Other:     Off-Loading:   [] Off-loading when [] walking  [] in bed [] sitting  [] Total non-weight bearing [] Right Leg  [] Left Leg   [x] Assistive Device [] Walker [] Cane  [x] Wheelchair  [] Crutches   [] Surgical shoe    [] Podus Boot(s)   [x] Foam Boot(s)  [] Roll About    [] Cast Boot [] CROW Boot  [x] Other: applied felt to shoe  Dietary:  [x] Increase Protein: examples ( Meat, cheese, eggs, greek yogurt, premier protein drink, fish, nuts )        Activity:  [x] Activity as tolerated:  [] Patient has no activity restrictions     [] Strict Bedrest: [] Remain off Work:     [] May return to full duty work:                                   [] Return to work with restrictions:             Return Appointment:    [] Nurse visit scheduled in *** day(s) or *** week(s)   [x] Return Appointment: With Dr. Weston Soto  in  1 [facility])  [] Ordered tests:      Electronically signed on 3/16/2021     [address] [facility] [address] Information: Should you experience any significant changes in your wound(s) or have questions about your wound care, please contact the [facility] Main at [address] [place] [address] 8:00 am - 4:30. If you need help with your wound outside these hours and cannot wait until we are again available, contact your PCP or go to the hospital emergency room. PLEASE NOTE: IF YOU ARE UNABLE TO OBTAIN WOUND SUPPLIES, CONTINUE TO USE THE SUPPLIES YOU HAVE AVAILABLE UNTIL YOU ARE ABLE TO REACH US. IT IS MOST IMPORTANT TO KEEP THE WOUND COVERED AT ALL TIMES.      Physician Signature:_______________________ Dr. Weston Soto

## 2021-03-23 NOTE — WOUND CARE
Wound Center  Progress Note    Subjective:   Patient is for follow up of LE ulcer(s). Patient is doing well. No concerns are reported. No difficulty or problems with wound care. Wound care is being performed by staff/pt and consists of dressing changes and offloading wound(s). No complaints of wound pain. There have been no changes in patient's medical history in the interim. ROS:  No fever or chills. No rash. No pain at site of wound    Objective:   General: NAD  Psych: Cooperative, no anxiety or depression  Neuro: Alert, oriented to person/place/situation. Otherwise nonfocal.  Extremities: Bilateral mild pitting edema is noted. Skin color is normal.   Vascular exam:  No gross changes in pedal pulses. Capillary refill is intact, <3sec. Dermatologic:  Skin color appears normal for patient. Skin turgor is normal. Dystrophic nails are seen on the feet bilaterally. 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 Cont Sx shoe modified with both 5th met cut out and heel eversion pad. X-ray report from 1/12/21 showed no osteo by radiology read. Actual xray image however does show erosive changes guillermina on the obl view with area of adjacent lucency (ulcer) which is consistent with osteomyelitis. BP was high 200/100+ but was then remeasured and came down to 666 systolic. He has some bowel prep and has not gone in 4 days and Pt thinks it is related to that and he will monitor. HBO work-up is a no go with medical issues. Plan is reviewed with patient who expresses understanding. Questions were answered.   Patient is to follow up with me in 1 wk. Kwadwo Cuevas DPM        Ulcer assessment: Due to presence of necrotic tissue within the wound bed, ulcer requires debridement. Procedure: Debridement:   The indication for debridement was reviewed with patient. Risks of procedure (bleeding, infection, pain) were discussed with patient and consent signed. Questions were answered    Subcutaneous excisional debridement   Indication: to remove necrotic tissue/ devitalized tissue/ soft eschar/ infected tissue/obtain deep wound culture through subcutaneous layer of wound bed  Consent in chart   Anesthesia: Topical 2% lidocaine jelly  Instrument: 15 Blade,    Residual Necrosis: Present and scored   Bleeding: <1ml   Hemostasis: Pressure   Patient tolerated procedure well   Procedural Pain: none  Post - procedural pain: none    Post debridement measurements: see progress note.   Surface area debrided: <20 sq. cm

## 2021-03-23 NOTE — WOUND CARE
03/23/21 1029   Wound Foot Right;Plantar #1   Date First Assessed/Time First Assessed: 01/12/21 0824   Present on Hospital Admission: Yes  Location: Foot  Wound Location Orientation: Right;Plantar  Wound Description: #1   Wound Image    Wound Length (cm) 0.1 cm   Wound Width (cm) 0.1 cm   Wound Depth (cm) 0.1 cm   Wound Surface Area (cm^2) 0.01 cm^2   Change in Wound Size % 99.41   Wound Volume (cm^3) 0 cm^3   Wound Healing % 100   Wound Assessment Epithelialization   Drainage Amount None   Wound Odor None   Blood pressure (!) 205/120, pulse 64, temperature 97.2 °F (36.2 °C), resp. rate 16.   Patient denies symptoms, observed bonner patent and draining, no restrictive clothing, reports missed bowel program yesterday which may be contributing to MD GUNJAN notified

## 2021-03-23 NOTE — WOUND CARE
03/23/21 1059   Wound Foot Right;Plantar #1   Date First Assessed/Time First Assessed: 01/12/21 0824   Present on Hospital Admission: Yes  Location: Foot  Wound Location Orientation: Right;Plantar  Wound Description: #1   Dressing/Treatment Collagen with Ag;Gauze dressing/dressing sponge  (gentamicin ointment )   Discharge Condition: Stable     Pain: 4    Ambulatory Status: Wheelchair     Discharge Destination: Home     Transportation: Car    Accompanied by: Self     Discharge instructions reviewed with Patient and copy or written instructions have been provided. All questions/concerns have been addressed at this time.

## 2021-03-30 ENCOUNTER — HOSPITAL ENCOUNTER (OUTPATIENT)
Dept: WOUND CARE | Age: 41
Discharge: HOME | End: 2021-03-30
Payer: MEDICARE

## 2021-03-30 VITALS — SYSTOLIC BLOOD PRESSURE: 133 MMHG | DIASTOLIC BLOOD PRESSURE: 89 MMHG | RESPIRATION RATE: 18 BRPM | TEMPERATURE: 97.2 F

## 2021-03-30 PROCEDURE — 11042 DBRDMT SUBQ TIS 1ST 20SQCM/<: CPT | Performed by: PODIATRIST

## 2021-03-30 PROCEDURE — 74011000250 HC RX REV CODE- 250: Performed by: PODIATRIST

## 2021-03-30 RX ADMIN — Medication: at 09:08

## 2021-03-30 NOTE — WOUND CARE
03/30/21 0904   Right Leg Edema Point of Measurement   Leg circumference 31 cm   Ankle circumference 22 cm   RLE Peripheral Vascular    Capillary Refill Less than/equal to 3 seconds   Color Appropriate for race   Temperature Warm   Pedal Pulse Palpable   Wound Foot Right;Plantar #1   Date First Assessed/Time First Assessed: 01/12/21 0824   Present on Hospital Admission: Yes  Location: Foot  Wound Location Orientation: Right;Plantar  Wound Description: #1   Wound Image    Wound Length (cm) 0.1 cm   Wound Width (cm) 0.1 cm   Wound Depth (cm) 0.1 cm   Wound Surface Area (cm^2) 0.01 cm^2   Change in Wound Size % 99.41   Wound Volume (cm^3) 0 cm^3   Wound Healing % 100   Wound Assessment Other (Comment)  (dried exudate)   Drainage Amount Scant   Drainage Description Serous   Wound Odor None   Maribel-Wound/Incision Assessment Intact     Visit Vitals  /89   Temp 97.2 °F (36.2 °C)   Resp 18

## 2021-03-30 NOTE — WOUND CARE
03/30/21 0957   Wound Foot Right;Plantar #1   Date First Assessed/Time First Assessed: 01/12/21 0824   Present on Hospital Admission: Yes  Location: Foot  Wound Location Orientation: Right;Plantar  Wound Description: #1   Dressing/Treatment Collagen with Ag;Gauze dressing/dressing sponge  (gentamicin)   Discharge Condition: Stable     Pain: 0    Ambulatory Status: Wheelchair     Discharge Destination: Home     Transportation: Car    Accompanied by: Self     Discharge instructions reviewed with Patient and copy or written instructions have been provided. All questions/concerns have been addressed at this time.

## 2021-03-30 NOTE — WOUND CARE
Wound Center  Progress Note    Subjective:   Patient is for follow up of LE ulcer(s). Patient is doing well. No concerns are reported. No difficulty or problems with wound care. Wound care is being performed by staff/pt and consists of dressing changes and offloading wound(s). No complaints of wound pain. There have been no changes in patient's medical history in the interim. ROS:  No fever or chills. No rash. No pain at site of wound    Objective:   General: NAD  Psych: Cooperative, no anxiety or depression  Neuro: Alert, oriented to person/place/situation. Otherwise nonfocal.  Extremities: Bilateral mild pitting edema is noted. Skin color is normal.   Vascular exam:  No gross changes in pedal pulses. Capillary refill is intact, <3sec. Dermatologic:  Skin color appears normal for patient. Skin turgor is normal. Dystrophic nails are seen on the feet bilaterally. 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 Cont Sx shoe modified with both 5th met cut out and heel eversion pad. X-ray report from 1/12/21 showed no osteo by radiology read. Actual xray image however does show erosive changes guillermina on the obl view with area of adjacent lucency (ulcer) which is consistent with osteomyelitis. HBO work-up is a no go with medical issues. Plan is reviewed with patient who expresses understanding. Questions were answered. Patient is to follow up with me in 1 wk. Kwadwo Mello DPM        Ulcer assessment: Due to presence of necrotic tissue within the wound bed, ulcer requires debridement.     Procedure: Debridement:   The indication for debridement was reviewed with patient. Risks of procedure (bleeding, infection, pain) were discussed with patient and consent signed. Questions were answered    Subcutaneous excisional debridement   Indication: to remove necrotic tissue/ devitalized tissue/ soft eschar/ infected tissue/obtain deep wound culture through subcutaneous layer of wound bed  Consent in chart   Anesthesia: Topical 2% lidocaine jelly  Instrument: 15 Blade,    Residual Necrosis: Present and scored   Bleeding: <1ml   Hemostasis: Pressure   Patient tolerated procedure well   Procedural Pain: none  Post - procedural pain: none    Post debridement measurements: see progress note.   Surface area debrided: <20 sq. cm

## 2021-03-30 NOTE — DISCHARGE INSTRUCTIONS
Discharge Instructions for  [facility]  [address] [place], [address] [facility]  Telephone: [phone]    NAME:  Мария Ding  YOB: 1980  ACCOUNT NUMBER :  [de-identified]  DATE:  3/23/2021     HH: [facility] [phone]    Wound Cleansing:   Do not scrub or use excessive force. Cleanse wound prior to applying a clean dressing with:      [x] Cleanse wound with: Lorraine Dee and Madi baby shampoo lather leave 2-3 min then rinse with water      [] May Shower at Discharge     []  Shower on days of dressing change, remove dressing first    [] Keep Wound Dry in Shower (may purchase a cast cover if needed)     Topical Treatments:  Do not apply lotions, creams, or ointments to wound bed unless directed. [x] Apply moisturizing lotion A&D ointment to skin surrounding the wound prior to dressing change.  [] Apply antifungal ointment to skin surrounding the wound prior to dressing change.  [] Apply thin film of Zinc barrier ointment to skin immediately around wound. [x] Other: Gentamicin      Dressings:           Wound Location right plantar foot     [x] Apply Primary Dressing:       [] MediHoney Gel  [] Alginate   [] Alginate with Silver  [] Alginate        [] Collagen [x] Collagen with Silver Bisi    []  Moisten saline gauze     [] Hydrocolloid   [] MediHoney Alginate [] Foam with Silver   [] donut foam cut out   [] Hydrofera Blue    [] Mepilex Border    [] Moisten with Saline [] Hydrogel [] Mepitel     [] Bactroban/Mupirocin [] Polysporin  [] Other:  Betadine to michelle wound  [x] Cover and Secure with:     [x] Gauze [x] Héctor [] Kerlix   [] Ace Wrap [x] Cover Roll Tape [] ABD     [] Other:    Avoid contact of tape with skin.   [x] Change dressing: [] Daily    [x] Every Other Day [] Three times per week   [] Once a week [] Do Not Change Dressing   [] Other:     Off-Loading:   [] Off-loading when [] walking  [] in bed [] sitting  [] Total non-weight bearing [] Right Leg  [] Left Leg   [x] Assistive Device [] Walker [] Cane  [x] Wheelchair  [] Crutches   [] Surgical shoe    [] Podus Boot(s)   [x] Foam Boot(s)  [] Roll About    [] Cast Boot [] CROW Boot  [x] Other: applied felt to shoe  Dietary:  [x] Increase Protein: examples ( Meat, cheese, eggs, greek yogurt, premier protein drink, fish, nuts )        Activity:  [x] Activity as tolerated:  [] Patient has no activity restrictions     [] Strict Bedrest: [] Remain off Work:     [] May return to full duty work:                                   [] Return to work with restrictions:             Return Appointment:    [] Nurse visit scheduled in *** day(s) or *** week(s)   [x] Return Appointment: With Dr. Dylan Richardson  in  1 [facility])  [] Ordered tests:      Electronically signed on 3/23/2021     [address] [place] [address] Information: Should you experience any significant changes in your wound(s) or have questions about your wound care, please contact the [facility] Main at [address] [place] [address] 8:00 am - 4:30. If you need help with your wound outside these hours and cannot wait until we are again available, contact your PCP or go to the hospital emergency room. PLEASE NOTE: IF YOU ARE UNABLE TO OBTAIN WOUND SUPPLIES, CONTINUE TO USE THE SUPPLIES YOU HAVE AVAILABLE UNTIL YOU ARE ABLE TO REACH US. IT IS MOST IMPORTANT TO KEEP THE WOUND COVERED AT ALL TIMES.      Physician Signature:_______________________ Dr. Dylan Richardson

## 2021-04-06 ENCOUNTER — HOSPITAL ENCOUNTER (OUTPATIENT)
Dept: WOUND CARE | Age: 41
Discharge: HOME | End: 2021-04-06
Payer: COMMERCIAL

## 2021-04-06 VITALS
SYSTOLIC BLOOD PRESSURE: 129 MMHG | TEMPERATURE: 97.9 F | HEART RATE: 69 BPM | RESPIRATION RATE: 18 BRPM | DIASTOLIC BLOOD PRESSURE: 85 MMHG

## 2021-04-06 PROCEDURE — 11042 DBRDMT SUBQ TIS 1ST 20SQCM/<: CPT

## 2021-04-06 NOTE — WOUND CARE
04/06/21 1009   Wound Foot Right;Plantar #1   Date First Assessed/Time First Assessed: 01/12/21 0824   Present on Hospital Admission: Yes  Location: Foot  Wound Location Orientation: Right;Plantar  Wound Description: #1   Dressing/Treatment Collagen with Ag;Gauze dressing/dressing sponge   Discharge Condition: Stable     Pain: 3    Ambulatory Status: Wheelchair     Discharge Destination: Home     Transportation: Car    Accompanied by: Self     Discharge instructions reviewed with Patient and copy or written instructions have been provided. All questions/concerns have been addressed at this time.

## 2021-04-06 NOTE — DISCHARGE INSTRUCTIONS
Discharge Instructions for  [facility]  [address] [place], [address] [facility]  Telephone: [phone]    NAME:  Olivier Jung  YOB: 1980  ACCOUNT NUMBER :  [de-identified]  DATE:  3/30/2021     HH: [facility] [phone]    Wound Cleansing:   Do not scrub or use excessive force. Cleanse wound prior to applying a clean dressing with:      [x] Cleanse wound with: Mary Davis and Madi baby shampoo lather leave 2-3 min then rinse with water      [] May Shower at Discharge     []  Shower on days of dressing change, remove dressing first    [] Keep Wound Dry in Shower (may purchase a cast cover if needed)     Topical Treatments:  Do not apply lotions, creams, or ointments to wound bed unless directed. [x] Apply moisturizing lotion A&D ointment to skin surrounding the wound prior to dressing change.  [] Apply antifungal ointment to skin surrounding the wound prior to dressing change.  [] Apply thin film of Zinc barrier ointment to skin immediately around wound. [x] Other: Gentamicin      Dressings:           Wound Location right plantar foot     [x] Apply Primary Dressing:       [] MediHoney Gel  [] Alginate   [] Alginate with Silver  [] Alginate        [] Collagen [x] Collagen with Silver Bisi    []  Moisten saline gauze     [] Hydrocolloid   [] MediHoney Alginate [] Foam with Silver   [] donut foam cut out   [] Hydrofera Blue    [] Mepilex Border    [] Moisten with Saline [] Hydrogel [] Mepitel     [] Bactroban/Mupirocin [] Polysporin  [] Other:  Betadine to michelle wound  [x] Cover and Secure with:     [x] Gauze [x] Héctor [] Kerlix   [] Ace Wrap [x] Cover Roll Tape [] ABD     [] Other:    Avoid contact of tape with skin.   [x] Change dressing: [] Daily    [x] Every Other Day [] Three times per week   [] Once a week [] Do Not Change Dressing   [] Other:     Off-Loading:   [] Off-loading when [] walking  [] in bed [] sitting  [] Total non-weight bearing [] Right Leg  [] Left Leg   [x] Assistive Device [] Walker [] Cane  [x] Wheelchair  [] Crutches   [] Surgical shoe    [] Podus Boot(s)   [x] Foam Boot(s)  [] Roll About    [] Cast Boot [] CROW Boot  [x] Other: applied felt to shoe  Dietary:  [x] Increase Protein: examples ( Meat, cheese, eggs, greek yogurt, premier protein drink, fish, nuts )        Activity:  [x] Activity as tolerated:  [] Patient has no activity restrictions     [] Strict Bedrest: [] Remain off Work:     [] May return to full duty work:                                   [] Return to work with restrictions:             Return Appointment:    [] Nurse visit scheduled in *** day(s) or *** week(s)   [x] Return Appointment: With Dr. Lisseth Beach  in  1 [facility])  [] Ordered tests:      Electronically signed on 3/30/2021     [address] Information: Should you experience any significant changes in your wound(s) or have questions about your wound care, please contact the [facility] Main at [address] [place] [address] 8:00 am - 4:30. If you need help with your wound outside these hours and cannot wait until we are again available, contact your PCP or go to the hospital emergency room. PLEASE NOTE: IF YOU ARE UNABLE TO OBTAIN WOUND SUPPLIES, CONTINUE TO USE THE SUPPLIES YOU HAVE AVAILABLE UNTIL YOU ARE ABLE TO REACH US. IT IS MOST IMPORTANT TO KEEP THE WOUND COVERED AT ALL TIMES.      Physician Signature:_______________________ Dr. Lisseth Beach

## 2021-04-06 NOTE — WOUND CARE
04/06/21 0919   Right Leg Edema Point of Measurement   Leg circumference 28 cm   Ankle circumference 21.5 cm   RLE Peripheral Vascular    Capillary Refill Less than/equal to 3 seconds   Color Appropriate for race   Temperature Warm   Pedal Pulse Palpable   Wound Foot Right;Plantar #1   Date First Assessed/Time First Assessed: 01/12/21 0824   Present on Hospital Admission: Yes  Location: Foot  Wound Location Orientation: Right;Plantar  Wound Description: #1   Wound Image    Wound Length (cm) 0.1 cm   Wound Width (cm) 0.1 cm   Wound Depth (cm) 0.1 cm   Wound Surface Area (cm^2) 0.01 cm^2   Change in Wound Size % 99.41   Wound Volume (cm^3) 0 cm^3   Wound Healing % 100   Wound Assessment Epithelialization   Drainage Amount None   Wound Odor None   Maribel-Wound/Incision Assessment Intact     Visit Vitals  /85   Pulse 69   Temp 97.9 °F (36.6 °C)   Resp 18

## 2021-04-06 NOTE — WOUND CARE
Wound Center  Progress Note    Subjective:   Patient is for follow up of LE ulcer(s). Patient is doing well. No concerns are reported. No difficulty or problems with wound care. Wound care is being performed by staff/pt and consists of dressing changes and offloading wound(s). No complaints of wound pain. There have been no changes in patient's medical history in the interim. ROS:  No fever or chills. No rash. No pain at site of wound    Objective:   General: NAD  Psych: Cooperative, no anxiety or depression  Neuro: Alert, oriented to person/place/situation. Otherwise nonfocal.  Extremities: Bilateral mild pitting edema is noted. Skin color is normal.   Vascular exam:  No gross changes in pedal pulses. Capillary refill is intact, <3sec. Dermatologic:  Skin color appears normal for patient. Skin turgor is normal. Dystrophic nails are seen on the feet bilaterally. 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 Cont Sx shoe modified with both 5th met cut out and heel eversion pad - additional 1/4\" added. Consider new xray next week. X-ray report from 1/12/21 showed no osteo by radiology read. Actual xray image however does show erosive changes guillermina on the obl view with area of adjacent lucency (ulcer) which is consistent with osteomyelitis. HBO work-up is a no go with medical issues. Plan is reviewed with patient who expresses understanding. Questions were answered. Patient is to follow up with me in 1 wk. Kwadwo Reid DPM        Ulcer assessment: Due to presence of necrotic tissue within the wound bed, ulcer requires debridement. Procedure: Debridement:   The indication for debridement was reviewed with patient. Risks of procedure (bleeding, infection, pain) were discussed with patient and consent signed. Questions were answered    Subcutaneous excisional debridement   Indication: to remove necrotic tissue/ devitalized tissue/ soft eschar/ infected tissue/obtain deep wound culture through subcutaneous layer of wound bed  Consent in chart   Anesthesia: Topical 2% lidocaine jelly  Instrument: 15 Blade,    Residual Necrosis: Present and scored   Bleeding: <1ml   Hemostasis: Pressure   Patient tolerated procedure well   Procedural Pain: none  Post - procedural pain: none    Post debridement measurements: see progress note.   Surface area debrided: <20 sq. cm

## 2021-04-13 ENCOUNTER — HOSPITAL ENCOUNTER (OUTPATIENT)
Dept: GENERAL RADIOLOGY | Age: 41
Discharge: HOME | End: 2021-04-13
Attending: PODIATRIST
Payer: MEDICARE

## 2021-04-13 ENCOUNTER — HOSPITAL ENCOUNTER (OUTPATIENT)
Dept: WOUND CARE | Age: 41
Discharge: HOME | End: 2021-04-13
Payer: MEDICARE

## 2021-04-13 VITALS
TEMPERATURE: 96.5 F | DIASTOLIC BLOOD PRESSURE: 68 MMHG | HEART RATE: 82 BPM | SYSTOLIC BLOOD PRESSURE: 103 MMHG | RESPIRATION RATE: 16 BRPM

## 2021-04-13 PROCEDURE — 73630 X-RAY EXAM OF FOOT: CPT

## 2021-04-13 PROCEDURE — 11042 DBRDMT SUBQ TIS 1ST 20SQCM/<: CPT

## 2021-04-13 PROCEDURE — 74011000250 HC RX REV CODE- 250: Performed by: PODIATRIST

## 2021-04-13 RX ADMIN — Medication: at 09:55

## 2021-04-13 NOTE — WOUND CARE
04/13/21 1024   Wound Foot Right;Plantar #1   Date First Assessed/Time First Assessed: 01/12/21 0824   Present on Hospital Admission: Yes  Location: Foot  Wound Location Orientation: Right;Plantar  Wound Description: #1   Dressing/Treatment Betadine swabs/Povidone Iodine;Collagen with Ag  (gentamicin ointment)   Discharge Condition: Stable     Pain: 1    Ambulatory Status: Wheelchair     Discharge Destination: Home     Transportation: Car    Accompanied by: Self     Discharge instructions reviewed with Patient and copy or written instructions have been provided. All questions/concerns have been addressed at this time.

## 2021-04-13 NOTE — WOUND CARE
04/13/21 0952   Right Leg Edema Point of Measurement   Leg circumference 27 cm   Ankle circumference 24 cm   RLE Peripheral Vascular    Capillary Refill Less than/equal to 3 seconds   Color Appropriate for race   Temperature Warm   Pedal Pulse Palpable   Wound Foot Right;Plantar #1   Date First Assessed/Time First Assessed: 01/12/21 0824   Present on Hospital Admission: Yes  Location: Foot  Wound Location Orientation: Right;Plantar  Wound Description: #1   Wound Image    Wound Length (cm) 1 cm   Wound Width (cm) 0.7 cm   Wound Depth (cm) 0.5 cm   Wound Surface Area (cm^2) 0.7 cm^2   Change in Wound Size % 58.82   Wound Volume (cm^3) 0.35 cm^3   Wound Healing % 59   Wound Assessment Pink/red   Drainage Amount Moderate   Drainage Description Serosanguinous   Wound Odor None   Maribel-Wound/Incision Assessment Maceration  (calloused)   Blood pressure 103/68, pulse 82, temperature (!) 96.5 °F (35.8 °C), resp. rate 16.

## 2021-04-13 NOTE — DISCHARGE INSTRUCTIONS
Discharge Instructions for  [address]  [address] [place], [address] [facility]  Telephone: [phone]    NAME:  Jazmyn Zhang  YOB: 1980  ACCOUNT NUMBER :  [de-identified]  DATE:  4/6/2021     HH: [facility] [phone]    Wound Cleansing:   Do not scrub or use excessive force. Cleanse wound prior to applying a clean dressing with:      [x] Cleanse wound with: Dorita Haji and Madi baby shampoo lather leave 2-3 min then rinse with water      [] May Shower at Discharge     []  Shower on days of dressing change, remove dressing first    [] Keep Wound Dry in Shower (may purchase a cast cover if needed)     Topical Treatments:  Do not apply lotions, creams, or ointments to wound bed unless directed. [x] Apply moisturizing lotion A&D ointment to skin surrounding the wound prior to dressing change.  [] Apply antifungal ointment to skin surrounding the wound prior to dressing change.  [] Apply thin film of Zinc barrier ointment to skin immediately around wound. [x] Other: Gentamicin      Dressings:           Wound Location right plantar foot     [x] Apply Primary Dressing:       [] MediHoney Gel  [] Alginate   [] Alginate with Silver  [] Alginate        [] Collagen [x] Collagen with Silver Bisi    []  Moisten saline gauze     [] Hydrocolloid   [] MediHoney Alginate [] Foam with Silver   [] donut foam cut out   [] Hydrofera Blue    [] Mepilex Border    [] Moisten with Saline [] Hydrogel [] Mepitel     [] Bactroban/Mupirocin [] Polysporin  [] Other:  Betadine to michelle wound  [x] Cover and Secure with:     [x] Gauze [] Roland Rouleau [] Kerlix   [] Ace Wrap [x] Cover Roll Tape [] ABD     [] Other:    Avoid contact of tape with skin.   [x] Change dressing: [] Daily    [x] Every Other Day [] Three times per week   [] Once a week [] Do Not Change Dressing   [] Other:     Off-Loading:   [] Off-loading when [] walking  [] in bed [] sitting  [] Total non-weight bearing  [] Right Leg  [] Left Leg   [x] Assistive Device [] Walker [] Cane  [x] Wheelchair  [] Crutches   [] Surgical shoe    [] Podus Boot(s)   [x] Foam Boot(s)  [] Roll About    [] Cast Boot [] CROW Boot  [x] Other: applied felt to shoe  Dietary:  [x] Increase Protein: examples ( Meat, cheese, eggs, greek yogurt, premier protein drink, fish, nuts )        Activity:  [x] Activity as tolerated:  [] Patient has no activity restrictions     [] Strict Bedrest: [] Remain off Work:     [] May return to full duty work:                                   [] Return to work with restrictions:             Return Appointment:    [] Nurse visit scheduled in *** day(s) or *** week(s)   [x] Return Appointment: With Dr. Durga Lizama  in  1 [facility])  [] Ordered tests:      Electronically signed on 4/6/2021     [address] [place] [address] Information: Should you experience any significant changes in your wound(s) or have questions about your wound care, please contact the [facility] Main at [address] [place] [address] 8:00 am - 4:30. If you need help with your wound outside these hours and cannot wait until we are again available, contact your PCP or go to the hospital emergency room. PLEASE NOTE: IF YOU ARE UNABLE TO OBTAIN WOUND SUPPLIES, CONTINUE TO USE THE SUPPLIES YOU HAVE AVAILABLE UNTIL YOU ARE ABLE TO REACH US. IT IS MOST IMPORTANT TO KEEP THE WOUND COVERED AT ALL TIMES.      Physician Signature:_______________________ Dr. Durga Lizama

## 2021-04-13 NOTE — WOUND CARE
Wound Center  Progress Note    Subjective:   Patient is for follow up of LE ulcer(s). Patient is doing well. No concerns are reported. No difficulty or problems with wound care. Wound care is being performed by staff/pt and consists of dressing changes and offloading wound(s). No complaints of wound pain. There have been no changes in patient's medical history in the interim. ROS:  No fever or chills. No rash. No pain at site of wound    Objective:   General: NAD  Psych: Cooperative, no anxiety or depression  Neuro: Alert, oriented to person/place/situation. Otherwise nonfocal.  Extremities: Bilateral mild pitting edema is noted. Skin color is normal.   Vascular exam:  No gross changes in pedal pulses. Capillary refill is intact, <3sec. Dermatologic:  Skin color appears normal for patient. Skin turgor is normal. Dystrophic nails are seen on the feet bilaterally. 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 Cont Sx shoe modified with both 5th met cut out and heel eversion pad. Order placed for  new xray. X-ray report from 1/12/21 showed no osteo by radiology read. Actual xray image however does show erosive changes guillermina on the obl view with area of adjacent lucency (ulcer) which is consistent with osteomyelitis. States he knows he is purring extreme pressure on the area and he is gettign botox injection to try and help that this week. HBO work-up is a no go with medical issues. Plan is reviewed with patient who expresses understanding. Questions were answered.   Patient is to follow up with me in 1 william SON[facility]        Ulcer assessment: Due to presence of necrotic tissue within the wound bed, ulcer requires debridement. Procedure: Debridement:   The indication for debridement was reviewed with patient. Risks of procedure (bleeding, infection, pain) were discussed with patient and consent signed. Questions were answered    Subcutaneous excisional debridement   Indication: to remove necrotic tissue/ devitalized tissue/ soft eschar/ infected tissue/obtain deep wound culture through subcutaneous layer of wound bed  Consent in chart   Anesthesia: Topical 2% lidocaine jelly  Instrument: 15 Blade,    Residual Necrosis: Present and scored   Bleeding: <1ml   Hemostasis: Pressure   Patient tolerated procedure well   Procedural Pain: none  Post - procedural pain: none    Post debridement measurements: see progress note.   Surface area debrided: <20 sq. cm

## 2021-04-20 ENCOUNTER — HOSPITAL ENCOUNTER (OUTPATIENT)
Dept: WOUND CARE | Age: 41
Discharge: HOME | End: 2021-04-20
Payer: MEDICARE

## 2021-04-20 VITALS
DIASTOLIC BLOOD PRESSURE: 106 MMHG | TEMPERATURE: 97.6 F | HEART RATE: 66 BPM | SYSTOLIC BLOOD PRESSURE: 161 MMHG | RESPIRATION RATE: 18 BRPM

## 2021-04-20 PROCEDURE — 11042 DBRDMT SUBQ TIS 1ST 20SQCM/<: CPT

## 2021-04-20 NOTE — WOUND CARE
04/20/21 1008   Wound Foot Right;Plantar #1   Date First Assessed/Time First Assessed: 01/12/21 0824   Present on Hospital Admission: Yes  Location: Foot  Wound Location Orientation: Right;Plantar  Wound Description: #1   Dressing/Treatment Collagen with Ag;Gauze dressing/dressing sponge   Discharge Condition: Stable     Pain: 0    Ambulatory Status: Wheelchair     Discharge Destination: Home     Transportation: Car    Accompanied by: Self     Discharge instructions reviewed with Patient and copy or written instructions have been provided. All questions/concerns have been addressed at this time.

## 2021-04-20 NOTE — WOUND CARE
04/20/21 0931   Right Leg Edema Point of Measurement   Leg circumference 29.5 cm   Ankle circumference 23.4 cm   RLE Peripheral Vascular    Capillary Refill Less than/equal to 3 seconds   Color Appropriate for race   Temperature Warm   Pedal Pulse Palpable   Wound Foot Right;Plantar #1   Date First Assessed/Time First Assessed: 01/12/21 0824   Present on Hospital Admission: Yes  Location: Foot  Wound Location Orientation: Right;Plantar  Wound Description: #1   Wound Image    Wound Length (cm) 0.6 cm   Wound Width (cm) 1 cm   Wound Depth (cm) 0.2 cm   Wound Surface Area (cm^2) 0.6 cm^2   Change in Wound Size % 64.71   Wound Volume (cm^3) 0.12 cm^3   Wound Healing % 86   Wound Assessment Pink/red;Epithelialization   Drainage Amount Small   Drainage Description Serosanguinous   Wound Odor None   Maribel-Wound/Incision Assessment Intact     Visit Vitals  BP (!) 161/106   Pulse 66   Temp 97.6 °F (36.4 °C)   Resp 18

## 2021-04-20 NOTE — DISCHARGE INSTRUCTIONS
Discharge Instructions for  [facility]  [place] [address], [address] [facility]  Telephone: [phone]    NAME:  Hector Cabrera  YOB: 1980  ACCOUNT NUMBER :  [de-identified]  DATE:  4/13/2021     HH: [facility] [phone]    Wound Cleansing:   Do not scrub or use excessive force. Cleanse wound prior to applying a clean dressing with:      [x] Cleanse wound with: Itzel Chappell and Madi baby shampoo lather leave 2-3 min then rinse with water      [] May Shower at Discharge     []  Shower on days of dressing change, remove dressing first    [] Keep Wound Dry in Shower (may purchase a cast cover if needed)     Topical Treatments:  Do not apply lotions, creams, or ointments to wound bed unless directed. [x] Apply moisturizing lotion A&D ointment to skin surrounding the wound prior to dressing change.  [] Apply antifungal ointment to skin surrounding the wound prior to dressing change.  [] Apply thin film of Zinc barrier ointment to skin immediately around wound. [x] Other: Gentamicin      Dressings:           Wound Location right plantar foot     [x] Apply Primary Dressing:       [] MediHoney Gel  [] Alginate   [] Alginate with Silver  [] Alginate        [] Collagen [x] Collagen with Silver Bisi    []  Moisten saline gauze     [] Hydrocolloid   [] MediHoney Alginate [] Foam with Silver   [] donut foam cut out   [] Hydrofera Blue    [] Mepilex Border    [] Moisten with Saline [] Hydrogel [] Mepitel     [] Bactroban/Mupirocin [] Polysporin  [x] Other:  Betadine to michelle wound  [x] Cover and Secure with:     [x] Gauze [] Lisa Medin [] Kerlix   [] Ace Wrap [x] Cover Roll Tape [] ABD     [] Other:    Avoid contact of tape with skin.   [x] Change dressing: [] Daily    [x] Every Other Day [] Three times per week   [] Once a week [] Do Not Change Dressing   [] Other:     Off-Loading:   [] Off-loading when [] walking  [] in bed [] sitting  [] Total non-weight bearing [] Right Leg  [] Left Leg   [x] Assistive Device [] Walker [] Cane  [x] Wheelchair  [] Crutches   [] Surgical shoe    [] Podus Boot(s)   [x] Foam Boot(s)  [] Roll About    [] Cast Boot [] CROW Boot  [x] Other: applied felt to shoe  Dietary:  [x] Increase Protein: examples ( Meat, cheese, eggs, greek yogurt, premier protein drink, fish, nuts )        Activity:  [x] Activity as tolerated:  [] Patient has no activity restrictions     [] Strict Bedrest: [] Remain off Work:     [] May return to full duty work:                                   [] Return to work with restrictions:             Return Appointment:    [] Nurse visit scheduled in *** day(s) or *** week(s)   [x] Return Appointment: With Dr. Marcio Neville  in  1 [facility])  [] Ordered tests:      Electronically signed on 4/13/2021     [address] [facility] Information: Should you experience any significant changes in your wound(s) or have questions about your wound care, please contact the [facility] Main at [address] [place] [address] 8:00 am - 4:30. If you need help with your wound outside these hours and cannot wait until we are again available, contact your PCP or go to the hospital emergency room. PLEASE NOTE: IF YOU ARE UNABLE TO OBTAIN WOUND SUPPLIES, CONTINUE TO USE THE SUPPLIES YOU HAVE AVAILABLE UNTIL YOU ARE ABLE TO REACH US. IT IS MOST IMPORTANT TO KEEP THE WOUND COVERED AT ALL TIMES.      Physician Signature:_______________________ Dr. Marcio Neville

## 2021-04-20 NOTE — WOUND CARE
Wound Center  Progress Note    Subjective:   Patient is for follow up of LE ulcer(s). Patient is doing well. No concerns are reported. No difficulty or problems with wound care. Wound care is being performed by staff/pt and consists of dressing changes and offloading wound(s). No complaints of wound pain. There have been no changes in patient's medical history in the interim. ROS:  No fever or chills. No rash. No pain at site of wound    Objective:   General: NAD  Psych: Cooperative, no anxiety or depression  Neuro: Alert, oriented to person/place/situation. Otherwise nonfocal.  Extremities: Bilateral mild pitting edema is noted. Skin color is normal.   Vascular exam:  No gross changes in pedal pulses. Capillary refill is intact, <3sec. Dermatologic:  Skin color appears normal for patient. Skin turgor is normal. Dystrophic nails are seen on the feet bilaterally. Ulcer Description:   Measurement: in cm pre/post debridement:  1.0 x 0.6 x 0.2 / same inc depth to 0.3 (probes bone)  Ulcer bed: Mixed Granular/Fibrotic    Periwound: Calloused, nontender  Exudate: Moderate amount Serous exudate  Odor:  -    Assessment:  Diabetes with foot ulcer  E11.621  Pritchard Grade III Ulcer R 5th met (probes bone) L97.514    Plan:    Dressing: Gent gauze with foam doughnut pad Frequency: every other day. Cont Sx shoe modified with both 5th met cut out and heel eversion pad. Order placed for  new xray. X-ray report from 1/12/21 & 4/13 showed no osteo by radiology read. Actual xray image however does show erosive changes guillermina on the obl view with area of adjacent lucency (ulcer) which is consistent with osteomyelitis. States he knows he is putting extreme pressure on the area and he is going to see how his botox injection works. HBO work-up is a no go with medical issues. Plan is reviewed with patient who expresses understanding. Questions were answered.   Patient is to follow up with me in 1 william Mason DPM        Ulcer assessment: Due to presence of necrotic tissue within the wound bed, ulcer requires debridement. Procedure: Debridement:   The indication for debridement was reviewed with patient. Risks of procedure (bleeding, infection, pain) were discussed with patient and consent signed. Questions were answered    Subcutaneous excisional debridement   Indication: to remove necrotic tissue/ devitalized tissue/ soft eschar/ infected tissue/obtain deep wound culture through subcutaneous layer of wound bed  Consent in chart   Anesthesia: Topical 2% lidocaine jelly  Instrument: 15 Blade,    Residual Necrosis: Present and scored   Bleeding: <1ml   Hemostasis: Pressure   Patient tolerated procedure well   Procedural Pain: none  Post - procedural pain: none    Post debridement measurements: see progress note.   Surface area debrided: <20 sq. cm

## 2021-04-27 ENCOUNTER — HOSPITAL ENCOUNTER (OUTPATIENT)
Dept: WOUND CARE | Age: 41
Discharge: HOME | End: 2021-04-27
Admitting: PODIATRIST
Payer: MEDICARE

## 2021-04-27 VITALS
SYSTOLIC BLOOD PRESSURE: 228 MMHG | DIASTOLIC BLOOD PRESSURE: 129 MMHG | TEMPERATURE: 97.8 F | HEART RATE: 61 BPM | RESPIRATION RATE: 16 BRPM

## 2021-04-27 PROCEDURE — 11042 DBRDMT SUBQ TIS 1ST 20SQCM/<: CPT

## 2021-04-27 NOTE — WOUND CARE
04/27/21 0854   RLE Peripheral Vascular    Capillary Refill Less than/equal to 3 seconds   Color Appropriate for race   Temperature Warm   Pedal Pulse Palpable   Circumference of Calf (cm) 28 cm   Circumference of Ankle (cm) 21 cm   Wound Foot Right;Plantar #1   Date First Assessed/Time First Assessed: 01/12/21 0824   Present on Hospital Admission: Yes  Location: Foot  Wound Location Orientation: Right;Plantar  Wound Description: #1   Wound Image    Wound Length (cm) 0.7 cm   Wound Width (cm) 0.4 cm   Wound Depth (cm) 0.4 cm   Wound Surface Area (cm^2) 0.28 cm^2   Change in Wound Size % 83.53   Wound Volume (cm^3) 0.11 cm^3   Wound Healing % 87   Wound Assessment [place]/red;Slough   Drainage Amount Moderate   Drainage Description Serosanguinous   Wound Odor None   Blood pressure (!) 228/129, pulse 61, temperature 97.8 °F (36.6 °C), resp. rate 16.   Denies hypertensive symptoms, reports severe spasms of lower legs, assessed suprapubic catheter draining, has had regular BM yesterday with bowel program, reports UTI with new Cipro and insertion of new suprapubic catheter yesterday

## 2021-04-27 NOTE — WOUND CARE
Wound Center  Progress Note    Subjective:   Patient is for follow up of LE ulcer(s). Patient is doing well. No concerns are reported. No difficulty or problems with wound care. Wound care is being performed by staff/pt and consists of dressing changes and offloading wound(s). No complaints of wound pain. There have been no changes in patient's medical history in the interim. ROS:  No fever or chills. No rash. No pain at site of wound    Objective:   General: NAD  Psych: Cooperative, no anxiety or depression  Neuro: Alert, oriented to person/place/situation. Otherwise nonfocal.  Extremities: Bilateral mild pitting edema is noted. Skin color is normal.   Vascular exam:  No gross changes in pedal pulses. Capillary refill is intact, <3sec. Dermatologic:  Skin color appears normal for patient. Skin turgor is normal. Dystrophic nails are seen on the feet bilaterally. Ulcer Description:   Measurement: in cm pre/post debridement:  0.7 x 0.4 x 0.4 / same inc depth to 0.5 (probes bone)  Ulcer bed: Mixed Granular/Fibrotic    Periwound: Calloused, nontender  Exudate: Moderate amount Serous exudate  Odor:  -    Assessment:  Diabetes with foot ulcer  E11.621  Pritchard Grade III Ulcer R 5th met (probes bone) L97.514    Plan:    Dressing: Gent gauze with foam doughnut pad Frequency: every other day. Cont Sx shoe modified with both 5th met cut out and heel eversion pad. Order placed for  new xray. X-ray report from 1/12/21 & 4/13 showed no osteo by radiology read. Actual xray image however does show erosive changes guillermina on the obl view with area of adjacent lucency (ulcer) which is consistent with osteomyelitis. States he knows he is putting extreme pressure on the area and he is going to see how his botox injection works. He explained the Botox was not very helpful in decreasing pressure. HBO work-up is a no go with medical issues. Plan is reviewed with patient who expresses understanding. Questions were answered. Patient is to follow up with me in 1 wk. Kwadwo Bassett DPM        Ulcer assessment: Due to presence of necrotic tissue within the wound bed, ulcer requires debridement. Procedure: Debridement:   The indication for debridement was reviewed with patient. Risks of procedure (bleeding, infection, pain) were discussed with patient and consent signed. Questions were answered    Subcutaneous excisional debridement   Indication: to remove necrotic tissue/ devitalized tissue/ soft eschar/ infected tissue/obtain deep wound culture through subcutaneous layer of wound bed  Consent in chart   Anesthesia: Topical 2% lidocaine jelly  Instrument: 15 Blade,    Residual Necrosis: Present and scored   Bleeding: <1ml   Hemostasis: Pressure   Patient tolerated procedure well   Procedural Pain: none  Post - procedural pain: none    Post debridement measurements: see progress note.   Surface area debrided: <20 sq. cm

## 2021-04-27 NOTE — DISCHARGE INSTRUCTIONS
Discharge Instructions for  [facility]  [address] [place], [address] [facility]  Telephone: [phone]    NAME:  Hardeep Gutierrez  YOB: 1980  ACCOUNT NUMBER :  [de-identified]  DATE:  4/20/2021     HH: [place] [phone]    Wound Cleansing:   Do not scrub or use excessive force. Cleanse wound prior to applying a clean dressing with:      [x] Cleanse wound with: Jesus Manuel Genre and Madi baby shampoo lather leave 2-3 min then rinse with water      [] May Shower at Discharge     []  Shower on days of dressing change, remove dressing first    [] Keep Wound Dry in Shower (may purchase a cast cover if needed)     Topical Treatments:  Do not apply lotions, creams, or ointments to wound bed unless directed. [x] Apply moisturizing lotion A&D ointment to skin surrounding the wound prior to dressing change.  [] Apply antifungal ointment to skin surrounding the wound prior to dressing change.  [] Apply thin film of Zinc barrier ointment to skin immediately around wound. [x] Other: Gentamicin      Dressings:           Wound Location right plantar foot     [x] Apply Primary Dressing:       [] MediHoney Gel  [] Alginate   [] Alginate with Silver  [] Alginate        [] Collagen [x] Collagen with Silver Bisi    []  Moisten saline gauze     [] Hydrocolloid   [] MediHoney Alginate [] Foam with Silver   [] donut foam cut out   [] Hydrofera Blue    [] Mepilex Border    [] Moisten with Saline [] Hydrogel [] Mepitel     [] Bactroban/Mupirocin [] Polysporin  [x] Other:  Betadine to michelle wound  [x] Cover and Secure with:     [x] Gauze [] Leeland Irene [] Kerlix   [] Ace Wrap [x] Cover Roll Tape [] ABD     [] Other:    Avoid contact of tape with skin.   [x] Change dressing: [] Daily    [x] Every Other Day [] Three times per week   [] Once a week [] Do Not Change Dressing   [] Other:     Off-Loading:   [] Off-loading when [] walking  [] in bed [] sitting  [] Total non-weight bearing [] Right Leg  [] Left Leg   [x] Assistive Device [] Walker [] Cane  [x] Wheelchair  [] Crutches   [] Surgical shoe    [] Podus Boot(s)   [x] Foam Boot(s)  [] Roll About    [] Cast Boot [] CROW Boot  [x] Other: applied felt to shoe  Dietary:  [x] Increase Protein: examples ( Meat, cheese, eggs, greek yogurt, premier protein drink, fish, nuts )        Activity:  [x] Activity as tolerated:  [] Patient has no activity restrictions     [] Strict Bedrest: [] Remain off Work:     [] May return to full duty work:                                   [] Return to work with restrictions:             Return Appointment:    [] Nurse visit scheduled in *** day(s) or *** week(s)   [x] Return Appointment: With Dr. Kendall North  in  1 [facility])  [] Ordered tests:      Electronically signed on 4/20/2021     [address] [place] [address] Information: Should you experience any significant changes in your wound(s) or have questions about your wound care, please contact the [facility] Main at [address] [place] [address] 8:00 am - 4:30. If you need help with your wound outside these hours and cannot wait until we are again available, contact your PCP or go to the hospital emergency room. PLEASE NOTE: IF YOU ARE UNABLE TO OBTAIN WOUND SUPPLIES, CONTINUE TO USE THE SUPPLIES YOU HAVE AVAILABLE UNTIL YOU ARE ABLE TO REACH US. IT IS MOST IMPORTANT TO KEEP THE WOUND COVERED AT ALL TIMES.      Physician Signature:_______________________ Dr. Kendall North

## 2021-04-27 NOTE — WOUND CARE
04/27/21 0928   Wound Foot Right;Plantar #1   Date First Assessed/Time First Assessed: 01/12/21 0824   Present on Hospital Admission: Yes  Location: Foot  Wound Location Orientation: Right;Plantar  Wound Description: #1   Dressing/Treatment Collagen with Ag;Gauze dressing/dressing sponge   Discharge Condition: Stable     Pain: 2    Ambulatory Status: Wheelchair     Discharge Destination: Home     Transportation: Car    Accompanied by: Self     Discharge instructions reviewed with Patient and copy or written instructions have been provided. All questions/concerns have been addressed at this time.

## 2021-04-30 ENCOUNTER — APPOINTMENT (OUTPATIENT)
Dept: GENERAL RADIOLOGY | Age: 41
End: 2021-04-30
Attending: EMERGENCY MEDICINE
Payer: COMMERCIAL

## 2021-04-30 ENCOUNTER — HOSPITAL ENCOUNTER (EMERGENCY)
Age: 41
Discharge: HOME | End: 2021-05-01
Attending: EMERGENCY MEDICINE
Payer: COMMERCIAL

## 2021-04-30 ENCOUNTER — APPOINTMENT (OUTPATIENT)
Dept: CT IMAGING | Age: 41
End: 2021-04-30
Attending: EMERGENCY MEDICINE
Payer: COMMERCIAL

## 2021-04-30 VITALS
DIASTOLIC BLOOD PRESSURE: 67 MMHG | OXYGEN SATURATION: 99 % | TEMPERATURE: 98.6 F | HEART RATE: 81 BPM | SYSTOLIC BLOOD PRESSURE: 128 MMHG | RESPIRATION RATE: 18 BRPM

## 2021-04-30 DIAGNOSIS — N39.0 URINARY TRACT INFECTION ASSOCIATED WITH INDWELLING URETHRAL CATHETER, INITIAL ENCOUNTER (HCC): ICD-10-CM

## 2021-04-30 DIAGNOSIS — T83.511A URINARY TRACT INFECTION ASSOCIATED WITH INDWELLING URETHRAL CATHETER, INITIAL ENCOUNTER (HCC): ICD-10-CM

## 2021-04-30 DIAGNOSIS — M54.2 NECK PAIN: ICD-10-CM

## 2021-04-30 DIAGNOSIS — E86.0 DEHYDRATION: ICD-10-CM
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
## 2021-04-30 LAB
BASOPHILS # BLD: 0.1 K/UL (ref 0–0.1)
BASOPHILS NFR BLD: 1 % (ref 0–1)
DIFFERENTIAL METHOD BLD: ABNORMAL
EOSINOPHIL # BLD: 0.4 K/UL (ref 0–0.4)
EOSINOPHIL NFR BLD: 4 % (ref 0–7)
ERYTHROCYTE [DISTWIDTH] IN BLOOD BY AUTOMATED COUNT: 19.3 % (ref 11.5–14.5)
HCT VFR BLD AUTO: 46.9 % (ref 36.6–50.3)
HGB BLD-MCNC: 15.6 G/DL (ref 12.1–17)
IMM GRANULOCYTES # BLD AUTO: 0 K/UL (ref 0–0.04)
IMM GRANULOCYTES NFR BLD AUTO: 0 % (ref 0–0.5)
LACTATE BLD-SCNC: 1.45 MMOL/L (ref 0.4–2)
LYMPHOCYTES # BLD: 1.8 K/UL (ref 0.8–3.5)
LYMPHOCYTES NFR BLD: 20 % (ref 12–49)
MCH RBC QN AUTO: 28.7 PG (ref 26–34)
MCHC RBC AUTO-ENTMCNC: 33.3 G/DL (ref 30–36.5)
MCV RBC AUTO: 86.4 FL (ref 80–99)
MONOCYTES # BLD: 0.7 K/UL (ref 0–1)
MONOCYTES NFR BLD: 8 % (ref 5–13)
NEUTS SEG # BLD: 6.4 K/UL (ref 1.8–8)
NEUTS SEG NFR BLD: 67 % (ref 32–75)
NRBC # BLD: 0 K/UL (ref 0–0.01)
NRBC BLD-RTO: 0 PER 100 WBC
PLATELET # BLD AUTO: 285 K/UL (ref 150–400)
PMV BLD AUTO: 10.5 FL (ref 8.9–12.9)
RBC # BLD AUTO: 5.43 M/UL (ref 4.1–5.7)
WBC # BLD AUTO: 9.5 K/UL (ref 4.1–11.1)

## 2021-04-30 PROCEDURE — 96374 THER/PROPH/DIAG INJ IV PUSH: CPT

## 2021-04-30 PROCEDURE — 93005 ELECTROCARDIOGRAM TRACING: CPT

## 2021-04-30 PROCEDURE — 74011250636 HC RX REV CODE- 250/636: Performed by: EMERGENCY MEDICINE

## 2021-04-30 PROCEDURE — 85025 COMPLETE CBC W/AUTO DIFF WBC: CPT

## 2021-04-30 PROCEDURE — 99285 EMERGENCY DEPT VISIT HI MDM: CPT

## 2021-04-30 PROCEDURE — 96361 HYDRATE IV INFUSION ADD-ON: CPT

## 2021-04-30 PROCEDURE — 83605 ASSAY OF LACTIC ACID: CPT

## 2021-04-30 PROCEDURE — 71045 X-RAY EXAM CHEST 1 VIEW: CPT

## 2021-04-30 PROCEDURE — 36415 COLL VENOUS BLD VENIPUNCTURE: CPT

## 2021-04-30 RX ORDER — FENTANYL CITRATE 50 UG/ML
50 INJECTION, SOLUTION INTRAMUSCULAR; INTRAVENOUS ONCE
Status: COMPLETED | OUTPATIENT
Start: 2021-04-30 | End: 2021-04-30

## 2021-04-30 RX ADMIN — FENTANYL CITRATE 50 MCG: 50 INJECTION, SOLUTION INTRAMUSCULAR; INTRAVENOUS at 22:55

## 2021-04-30 RX ADMIN — SODIUM CHLORIDE 1000 ML: 9 INJECTION, SOLUTION INTRAVENOUS at 22:55

## 2021-04-30 NOTE — ED NOTES
Received pt from [facility]. Pt was lying in stretcher, a/ox4, c/o throat pain, no sob. NAD, call bell in reach.

## 2021-05-01 ENCOUNTER — APPOINTMENT (OUTPATIENT)
Dept: CT IMAGING | Age: 41
End: 2021-05-01
Attending: EMERGENCY MEDICINE
Payer: COMMERCIAL

## 2021-05-01 LAB
ALBUMIN SERPL-MCNC: 3.4 G/DL (ref 3.5–5)
ALBUMIN/GLOB SERPL: 0.9 {RATIO} (ref 1.1–2.2)
ALP SERPL-CCNC: 76 U/L (ref 45–117)
ALT SERPL-CCNC: 11 U/L (ref 12–78)
ANION GAP SERPL CALC-SCNC: 7 MMOL/L (ref 5–15)
APPEARANCE UR: ABNORMAL
AST SERPL-CCNC: 17 U/L (ref 15–37)
ATRIAL RATE: 88 BPM
BACTERIA URNS QL MICRO: NEGATIVE /HPF
BILIRUB SERPL-MCNC: 0.3 MG/DL (ref 0.2–1)
BILIRUB UR QL: NEGATIVE
BUN SERPL-MCNC: 19 MG/DL (ref 6–20)
BUN/CREAT SERPL: 17 (ref 12–20)
CALCIUM SERPL-MCNC: 9 MG/DL (ref 8.5–10.1)
CALCULATED P AXIS, ECG09: 77 DEGREES
CALCULATED R AXIS, ECG10: 69 DEGREES
CALCULATED T AXIS, ECG11: 86 DEGREES
CHLORIDE SERPL-SCNC: 101 MMOL/L (ref 97–108)
CO2 SERPL-SCNC: 25 MMOL/L (ref 21–32)
COLOR UR: ABNORMAL
CREAT SERPL-MCNC: 1.13 MG/DL (ref 0.7–1.3)
DIAGNOSIS, 93000: NORMAL
EPITH CASTS URNS QL MICRO: ABNORMAL /LPF
GLOBULIN SER CALC-MCNC: 3.6 G/DL (ref 2–4)
GLUCOSE SERPL-MCNC: 438 MG/DL (ref 65–100)
GLUCOSE UR STRIP.AUTO-MCNC: >1000 MG/DL
HGB UR QL STRIP: ABNORMAL
KETONES UR QL STRIP.AUTO: ABNORMAL MG/DL
LEUKOCYTE ESTERASE UR QL STRIP.AUTO: ABNORMAL
NITRITE UR QL STRIP.AUTO: NEGATIVE
P-R INTERVAL, ECG05: 144 MS
PH UR STRIP: 6 [PH] (ref 5–8)
POTASSIUM SERPL-SCNC: 3.8 MMOL/L (ref 3.5–5.1)
PROT SERPL-MCNC: 7 G/DL (ref 6.4–8.2)
PROT UR STRIP-MCNC: 100 MG/DL
Q-T INTERVAL, ECG07: 412 MS
QRS DURATION, ECG06: 86 MS
QTC CALCULATION (BEZET), ECG08: 498 MS
RBC #/AREA URNS HPF: ABNORMAL /HPF (ref 0–5)
SODIUM SERPL-SCNC: 133 MMOL/L (ref 136–145)
SP GR UR REFRACTOMETRY: 1.02 (ref 1–1.03)
UA: UC IF INDICATED,UAUC: ABNORMAL
UROBILINOGEN UR QL STRIP.AUTO: 0.2 EU/DL (ref 0.2–1)
VENTRICULAR RATE, ECG03: 88 BPM
WBC URNS QL MICRO: ABNORMAL /HPF (ref 0–4)

## 2021-05-01 PROCEDURE — 70491 CT SOFT TISSUE NECK W/DYE: CPT

## 2021-05-01 PROCEDURE — 81001 URINALYSIS AUTO W/SCOPE: CPT

## 2021-05-01 PROCEDURE — 36415 COLL VENOUS BLD VENIPUNCTURE: CPT

## 2021-05-01 PROCEDURE — 80053 COMPREHEN METABOLIC PANEL: CPT

## 2021-05-01 PROCEDURE — 74011250637 HC RX REV CODE- 250/637: Performed by: EMERGENCY MEDICINE

## 2021-05-01 PROCEDURE — 87086 URINE CULTURE/COLONY COUNT: CPT

## 2021-05-01 PROCEDURE — 74011000636 HC RX REV CODE- 636: Performed by: EMERGENCY MEDICINE

## 2021-05-01 RX ORDER — LEVOFLOXACIN 750 MG/1
750 TABLET ORAL DAILY
Qty: 4 TAB | Refills: 0 | Status: SHIPPED | OUTPATIENT
Start: 2021-05-01 | End: 2021-05-05

## 2021-05-01 RX ORDER — LEVOFLOXACIN 750 MG/1
750 TABLET ORAL
Status: COMPLETED | OUTPATIENT
Start: 2021-05-01 | End: 2021-05-01

## 2021-05-01 RX ORDER — OXYCODONE HYDROCHLORIDE 5 MG/1
5 TABLET ORAL
Qty: 12 TAB | Refills: 0 | Status: SHIPPED | OUTPATIENT
Start: 2021-05-01 | End: 2021-05-04

## 2021-05-01 RX ADMIN — IOPAMIDOL 100 ML: 755 INJECTION, SOLUTION INTRAVENOUS at 01:08

## 2021-05-01 RX ADMIN — LEVOFLOXACIN 750 MG: 750 TABLET, FILM COATED ORAL at 04:16

## 2021-05-01 NOTE — ED NOTES
Discharge instructions given to patient by Dr. Bala Mccollum. Verbalized understanding of instructions. Patient discharged without difficulty. Patient discharged in stable condition via W/C accompanied by gf.
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
## 2021-05-01 NOTE — ED NOTES
Patient states his catheter was last changed on Tuesday and is declining to have it changed out again today. Spoke with physician, Kathy Boggs MD ok with pulling urine sample off existing catheter.

## 2021-05-01 NOTE — ED NOTES
Bedside shift change report given to Devendra Carlson RN (oncoming nurse) by Ale Edouard RN (offgoing nurse). Report included the following information SBAR.

## 2021-05-02 LAB
BACTERIA SPEC CULT: NORMAL
SERVICE CMNT-IMP: NORMAL

## 2021-05-11 ENCOUNTER — HOSPITAL ENCOUNTER (OUTPATIENT)
Dept: WOUND CARE | Age: 41
Discharge: HOME | End: 2021-05-11

## 2021-05-11 NOTE — DISCHARGE INSTRUCTIONS
Discharge Instructions for  [facility]  [address] [place], [address] [facility]  Telephone: [phone]    NAME:  Georgina Woodson  YOB: 1980  ACCOUNT NUMBER :  [de-identified]  DATE:  5/11/2021     HH: [place] [phone]    Wound Cleansing:   Do not scrub or use excessive force. Cleanse wound prior to applying a clean dressing with:      [x] Cleanse wound with: Elana West and Madi baby shampoo lather leave 2-3 min then rinse with water      [] May Shower at Discharge     []  Shower on days of dressing change, remove dressing first    [] Keep Wound Dry in Shower (may purchase a cast cover if needed)     Topical Treatments:  Do not apply lotions, creams, or ointments to wound bed unless directed. [x] Apply moisturizing lotion A&D ointment to skin surrounding the wound prior to dressing change.  [] Apply antifungal ointment to skin surrounding the wound prior to dressing change.  [] Apply thin film of Zinc barrier ointment to skin immediately around wound. [x] Other: Gentamicin      Dressings:           Wound Location right plantar foot     [x] Apply Primary Dressing:       [] MediHoney Gel  [] Alginate   [] Alginate with Silver  [] Alginate        [] Collagen [x] Collagen with Silver Bisi    []  Moisten saline gauze     [] Hydrocolloid   [] MediHoney Alginate [] Foam with Silver   [] donut foam cut out   [] Hydrofera Blue    [] Mepilex Border    [] Moisten with Saline [] Hydrogel [] Mepitel     [] Bactroban/Mupirocin [] Polysporin  [x] Other:  Betadine to michelle wound  [x] Cover and Secure with:     [x] Gauze [] Sharon Kraus [] Kerlix   [] Ace Wrap [x] Cover Roll Tape [] ABD     [] Other:    Avoid contact of tape with skin.   [x] Change dressing: [] Daily    [x] Every Other Day [] Three times per week   [] Once a week [] Do Not Change Dressing   [] Other:     Off-Loading:   [] Off-loading when [] walking  [] in bed [] sitting  [] Total non-weight bearing [] Right Leg  [] Left Leg   [x] Assistive Device [] Walker [] Cane  [x] Wheelchair  [] Crutches   [] Surgical shoe    [] Podus Boot(s)   [x] Foam Boot(s)  [] Roll About    [] Cast Boot [] CROW Boot  [x] Other: applied felt to shoe  Dietary:  [x] Increase Protein: examples ( Meat, cheese, eggs, greek yogurt, premier protein drink, fish, nuts )        Activity:  [x] Activity as tolerated:  [] Patient has no activity restrictions     [] Strict Bedrest: [] Remain off Work:     [] May return to full duty work:                                   [] Return to work with restrictions:             Return Appointment:    [] Nurse visit scheduled in *** day(s) or *** week(s)   [x] Return Appointment: With Dr. Casimiro Albarado  in  [facility])  [] Ordered tests:      Electronically signed on 5/11/2021     [address] [place] [address] Information: Should you experience any significant changes in your wound(s) or have questions about your wound care, please contact the [facility] Main at [address] [place] [address] 8:00 am - 4:30. If you need help with your wound outside these hours and cannot wait until we are again available, contact your PCP or go to the hospital emergency room. PLEASE NOTE: IF YOU ARE UNABLE TO OBTAIN WOUND SUPPLIES, CONTINUE TO USE THE SUPPLIES YOU HAVE AVAILABLE UNTIL YOU ARE ABLE TO REACH US. IT IS MOST IMPORTANT TO KEEP THE WOUND COVERED AT ALL TIMES.      Physician Signature:_______________________ Dr. Casimiro Albarado

## 2021-05-18 NOTE — WOUND CARE
Patients home health nurse called stated patients wound size has increased, patient has not been to wound care center for 3 weeks due to transportation issues, stated unable to change orders at this time since have not seen patient in past 3 weeks, encouraged to continue with same wound care and if patient develops, fever, chills, wound becomes infected looking will need to go to ER or medi clinic. Home health nurse to send picture of patients wound via secure email.

## 2021-05-18 NOTE — DISCHARGE INSTRUCTIONS
Discharge Instructions for  [facility]  [address], [address] [facility]  Telephone: [phone]    NAME:  Jazmyn Zhang  YOB: 1980  ACCOUNT NUMBER :  [de-identified]  DATE:  4/27/2021     HH: [facility] [phone]    Wound Cleansing:   Do not scrub or use excessive force. Cleanse wound prior to applying a clean dressing with:      [x] Cleanse wound with: Dorita Haji and Madi baby shampoo lather leave 2-3 min then rinse with water      [] May Shower at Discharge     []  Shower on days of dressing change, remove dressing first    [] Keep Wound Dry in Shower (may purchase a cast cover if needed)     Topical Treatments:  Do not apply lotions, creams, or ointments to wound bed unless directed. [x] Apply moisturizing lotion A&D ointment to skin surrounding the wound prior to dressing change.  [] Apply antifungal ointment to skin surrounding the wound prior to dressing change.  [] Apply thin film of Zinc barrier ointment to skin immediately around wound. [x] Other: Gentamicin      Dressings:           Wound Location right plantar foot     [x] Apply Primary Dressing:       [] MediHoney Gel  [] Alginate   [] Alginate with Silver  [] Alginate        [] Collagen [x] Collagen with Silver Bisi    []  Moisten saline gauze     [] Hydrocolloid   [] MediHoney Alginate [] Foam with Silver   [] donut foam cut out   [] Hydrofera Blue    [] Mepilex Border    [] Moisten with Saline [] Hydrogel [] Mepitel     [] Bactroban/Mupirocin [] Polysporin  [x] Other:  Betadine to michelle wound  [x] Cover and Secure with:     [x] Gauze [] Roland Rouleau [] Kerlix   [] Ace Wrap [x] Cover Roll Tape [] ABD     [] Other:    Avoid contact of tape with skin.   [x] Change dressing: [] Daily    [x] Every Other Day [] Three times per week   [] Once a week [] Do Not Change Dressing   [] Other:     Off-Loading:   [] Off-loading when [] walking  [] in bed [] sitting  [] Total non-weight bearing [] Right Leg  [] Left Leg   [x] Assistive Device [] Walker [] Cane  [x] Wheelchair  [] Crutches   [] Surgical shoe    [] Podus Boot(s)   [x] Foam Boot(s)  [] Roll About    [] Cast Boot [] CROW Boot  [x] Other: applied felt to shoe  Dietary:  [x] Increase Protein: examples ( Meat, cheese, eggs, greek yogurt, premier protein drink, fish, nuts )        Activity:  [x] Activity as tolerated:  [] Patient has no activity restrictions     [] Strict Bedrest: [] Remain off Work:     [] May return to full duty work:                                   [] Return to work with restrictions:             Return Appointment:    [] Nurse visit scheduled in *** day(s) or *** week(s)   [x] Return Appointment: With Dr. Rizwan Meza  in  [facility])  [] Ordered tests:      Electronically signed on 4/27/2021     [address] [place] [address] Information: Should you experience any significant changes in your wound(s) or have questions about your wound care, please contact the [facility] Main at [address] [place] [address] 8:00 am - 4:30. If you need help with your wound outside these hours and cannot wait until we are again available, contact your PCP or go to the hospital emergency room. PLEASE NOTE: IF YOU ARE UNABLE TO OBTAIN WOUND SUPPLIES, CONTINUE TO USE THE SUPPLIES YOU HAVE AVAILABLE UNTIL YOU ARE ABLE TO REACH US. IT IS MOST IMPORTANT TO KEEP THE WOUND COVERED AT ALL TIMES.      Physician Signature:_______________________ Dr. Rizwan Meza

## 2021-08-03 ENCOUNTER — HOSPITAL ENCOUNTER (OUTPATIENT)
Dept: WOUND CARE | Age: 41
Discharge: HOME | End: 2021-08-03
Payer: COMMERCIAL

## 2021-08-03 VITALS
DIASTOLIC BLOOD PRESSURE: 102 MMHG | HEART RATE: 73 BPM | RESPIRATION RATE: 16 BRPM | TEMPERATURE: 98.2 F | SYSTOLIC BLOOD PRESSURE: 145 MMHG

## 2021-08-03 PROBLEM — I10 HTN (HYPERTENSION), BENIGN: Status: RESOLVED | Noted: 2019-12-14 | Resolved: 2021-08-03

## 2021-08-03 PROCEDURE — 11042 DBRDMT SUBQ TIS 1ST 20SQCM/<: CPT | Performed by: PODIATRIST

## 2021-08-03 PROCEDURE — 99204 OFFICE O/P NEW MOD 45 MIN: CPT | Performed by: PODIATRIST

## 2021-08-03 NOTE — WOUND CARE
Wound Center  Progress Note    Subjective:   Patient is for follow up of LE ulcer and now has new ulcers(s). Patient is doing well. No complaints of wound pain. He has had car issues and has not been able to make it to the wound center. He recently found a transportation shuttle to bring him as his car conts to be worked on. There have been no changes in patient's medical history in the interim. ROS:  No fever or chills. No rash. No pain at site of wound    Objective:   General: NAD  Psych: Cooperative, no anxiety or depression  Neuro: Alert, oriented to person/place/situation. Otherwise nonfocal.  Extremities: Bilateral mild pitting edema is noted. Skin color is normal.   Vascular exam:  No gross changes in pedal pulses. Capillary refill is intact, <3sec. Dermatologic:  Skin color appears normal for patient. Skin turgor is normal. Dystrophic nails are seen on the feet bilaterally. Ulcer Description:   Measurement: in cm pre/post debridement:  1.6 x 1.3 x 0.2 / same inc depth to 0.3 at base of L 5th metatarsal.  Still has wound plantar to R submet 5 but smaller in size & it calluses over. + wounds smaller vs deep tissue injuries medial L foot, B heel and distal L 5th met. Ulcer bed: Mixed Granular/Fibrotic    Periwound: Calloused, nontender  Exudate: Moderate amount Serous exudate  Odor:  -    Assessment:  Diabetes with foot ulcer  E11.621  Ulcer L 5th met L97.522   Ulcer R 5th met L97.512    Plan:    Dressing: MH/ gauze to 5th met wounds  Otherwise BDW2D Frequency: every other day. Recommend that he strictly wear bunny or waffle boots and to not wear closed shoes. States he has these items they just need to be located. Explained that the main concern is pressure combined with neuropathy LOPS and that we need to manage these with the padding/boots to try and treat and help prevent future ulcrations.   This was a significant and separate identifiable E&M service from the performed procedure. Plan is reviewed with patient who expresses understanding. Questions were answered. Patient is to follow up with me in 1 wk. Kwadwo Prince DPM        Ulcer assessment: Due to presence of necrotic tissue within the wound bed, ulcer requires debridement. Procedure: Debridement:   The indication for debridement was reviewed with patient. Risks of procedure (bleeding, infection, pain) were discussed with patient and consent signed. Questions were answered    Subcutaneous excisional debridement   Indication: to remove necrotic tissue/ devitalized tissue/ soft eschar/ infected tissue/obtain deep wound culture through subcutaneous layer of wound bed  Consent in chart   Anesthesia: Topical 2% lidocaine jelly  Instrument: 15 Blade,    Residual Necrosis: Present and scored   Bleeding: <1ml   Hemostasis: Pressure   Patient tolerated procedure well   Procedural Pain: none  Post - procedural pain: none    Post debridement measurements: see progress note.   Surface area debrided: <20 sq. cm

## 2021-08-03 NOTE — WOUND CARE
08/03/21 1044   Right Leg Edema Point of Measurement   Leg circumference 29 cm   Ankle circumference 22 cm   Left Leg Edema Point of Measurement   Leg circumference 30 cm   Ankle circumference 22 cm   LLE Peripheral Vascular    Capillary Refill Less than/equal to 3 seconds   Color Appropriate for race   Temperature Warm   Post-tibial Pulse Doppler   Pedal Pulse Doppler   RLE Peripheral Vascular    Capillary Refill Less than/equal to 3 seconds   Color Appropriate for race   Temperature Warm   Post-tibial Pulse Doppler   Pedal Pulse Doppler   Wound Heel Right;Medial #2   Date First Assessed/Time First Assessed: 08/03/21 1049   Present on Hospital Admission: Yes  Location: Heel  Wound Location Orientation: Right;Medial  Wound Description: #2   Wound Image    Wound Etiology Deep Tissue/Injury   Wound Length (cm) 3 cm   Wound Width (cm) 4 cm   Wound Depth (cm) 0.1 cm   Wound Surface Area (cm^2) 12 cm^2   Wound Volume (cm^3) 1.2 cm^3   Wound Assessment Purple/maroon;Epithelialization   Drainage Amount None   Wound Odor None   Wound Foot Right;Plantar #1   Date First Assessed/Time First Assessed: 01/12/21 0824   Present on Hospital Admission: Yes  Location: Foot  Wound Location Orientation: Right;Plantar  Wound Description: #1   Wound Image    Wound Length (cm) 0.1 cm   Wound Width (cm) 0.1 cm   Wound Depth (cm) 0.1 cm   Wound Surface Area (cm^2) 0.01 cm^2   Change in Wound Size % 99.41   Wound Volume (cm^3) 0.001 cm^3   Wound Healing % 100   Wound Foot Left;Lateral;Distal Fissure, #3   Date First Assessed/Time First Assessed: 08/03/21 1054   Present on Hospital Admission: Yes  Primary Wound Type: Fissure  Location: Foot  Wound Location Orientation: Left;Lateral;Distal  Wound Description: Fissure, #3   Wound Image    Wound Length (cm) 0.2 cm   Wound Width (cm) 0.8 cm   Wound Depth (cm) 0.3 cm   Wound Surface Area (cm^2) 0.16 cm^2   Wound Volume (cm^3) 0.048 cm^3   Wound Assessment Pink/red   Drainage Amount Small Drainage Description Serous   Wound Odor None   Maribel-Wound/Incision Assessment Dry/flaky   Wound Foot Left;Lateral;Mid DTI, #4   Date First Assessed/Time First Assessed: 08/03/21 1055   Present on Hospital Admission: Yes  Location: Foot  Wound Location Orientation: Left;Lateral;Mid  Wound Description: DTI, #4   Wound Image    Wound Etiology Deep Tissue/Injury   Wound Length (cm) 2 cm   Wound Width (cm) 1.2 cm   Wound Depth (cm) 0.1 cm   Wound Surface Area (cm^2) 2.4 cm^2   Wound Volume (cm^3) 0.24 cm^3   Wound Assessment Purple/maroon   Drainage Amount None   Wound Odor None   Maribel-Wound/Incision Assessment Non-Blanchable erythema   Wound Foot Left;Lateral;Proximal #5   Date First Assessed/Time First Assessed: 08/03/21 1057   Present on Hospital Admission: Yes  Location: Foot  Wound Location Orientation: Left;Lateral;Proximal  Wound Description: #5   Wound Image    Wound Length (cm) 1.3 cm   Wound Width (cm) 1.6 cm   Wound Depth (cm) 0.2 cm   Wound Surface Area (cm^2) 2.08 cm^2   Wound Volume (cm^3) 0.416 cm^3   Wound Assessment Slough;Pink/red   Drainage Amount Moderate   Drainage Description Serosanguinous   Wound Odor None   Maribel-Wound/Incision Assessment Intact   Wound Heel Left;Medial #6   Date First Assessed/Time First Assessed: 08/03/21 1057   Present on Hospital Admission: Yes  Location: Heel  Wound Location Orientation: Left;Medial  Wound Description: #6   Wound Image    Wound Length (cm) 1.5 cm   Wound Width (cm) 1 cm   Wound Depth (cm) 0.1 cm   Wound Surface Area (cm^2) 1.5 cm^2   Wound Volume (cm^3) 0.15 cm^3   Wound Assessment Eschar dry   Drainage Amount None   Wound Odor None   Maribel-Wound/Incision Assessment Dry/flaky   Wound Foot Left;Medial #7   Date First Assessed/Time First Assessed: 08/03/21 1058   Present on Hospital Admission: Yes  Location: Foot  Wound Location Orientation: Left;Medial  Wound Description: #7   Wound Image    Wound Length (cm) 0.3 cm   Wound Width (cm) 0.7 cm   Wound Depth (cm) 0.2 cm   Wound Surface Area (cm^2) 0.21 cm^2   Wound Volume (cm^3) 0.042 cm^3   Wound Assessment Pink/red  (calloused)   Drainage Amount Moderate   Drainage Description Serosanguinous   Wound Odor None   Maribel-Wound/Incision Assessment Other (Comment)  (calloused)     Visit Vitals  BP (!) 145/102   Pulse 73   Temp 98.2 °F (36.8 °C)   Resp 16

## 2021-08-10 ENCOUNTER — HOSPITAL ENCOUNTER (OUTPATIENT)
Dept: WOUND CARE | Age: 41
Discharge: HOME | End: 2021-08-10
Payer: MEDICARE

## 2021-08-10 VITALS
DIASTOLIC BLOOD PRESSURE: 99 MMHG | RESPIRATION RATE: 16 BRPM | TEMPERATURE: 97.5 F | HEART RATE: 77 BPM | SYSTOLIC BLOOD PRESSURE: 178 MMHG

## 2021-08-10 PROCEDURE — 74011000250 HC RX REV CODE- 250: Performed by: PODIATRIST

## 2021-08-10 PROCEDURE — 11042 DBRDMT SUBQ TIS 1ST 20SQCM/<: CPT

## 2021-08-10 RX ADMIN — Medication: at 11:11

## 2021-08-10 NOTE — WOUND CARE
08/10/21 1200   Wound Foot Left;Medial #7   Date First Assessed/Time First Assessed: 08/10/21 1108   Present on Hospital Admission: Yes  Location: Foot  Wound Location Orientation: Left;Medial  Wound Description: #7   Dressing/Treatment Betadine swabs/Povidone Iodine;Gauze dressing/dressing sponge;Tape/Soft cloth adhesive tape   Wound Foot Left;Lateral;Proximal #5   Date First Assessed/Time First Assessed: 08/03/21 1057   Present on Hospital Admission: Yes  Location: Foot  Wound Location Orientation: Left;Lateral;Proximal  Wound Description: #5   Dressing/Treatment Honey gel/honey paste;Gauze dressing/dressing sponge;Roll gauze;Tape/Soft cloth adhesive tape   Wound Heel Left;Medial #6   Date First Assessed/Time First Assessed: 08/03/21 1057   Present on Hospital Admission: Yes  Location: Heel  Wound Location Orientation: Left;Medial  Wound Description: #6   Dressing/Treatment Betadine swabs/Povidone Iodine;Gauze dressing/dressing sponge;Roll gauze;Tape/Soft cloth adhesive tape   Wound Foot Right;Plantar #1   Date First Assessed/Time First Assessed: 01/12/21 0824   Present on Hospital Admission: Yes  Location: Foot  Wound Location Orientation: Right;Plantar  Wound Description: #1   Dressing/Treatment Honey gel/honey paste;Gauze dressing/dressing sponge;Roll gauze;Tape/Soft cloth adhesive tape   Discharge Condition: Stable     Pain: 0    Ambulatory Status: Wheelchair     Discharge Destination: Home     Transportation: Car    Accompanied by: Self     Discharge instructions reviewed with Patient and copy or written instructions have been provided. All questions/concerns have been addressed at this time.

## 2021-08-10 NOTE — WOUND CARE
08/10/21 1101   Right Leg Edema Point of Measurement   Leg circumference 29 cm   Ankle circumference 21 cm   Left Leg Edema Point of Measurement   Leg circumference 30 cm   Ankle circumference 21 cm   LLE Peripheral Vascular    Capillary Refill Less than/equal to 3 seconds   Color Appropriate for race   Temperature Warm   Pedal Pulse Palpable   RLE Peripheral Vascular    Capillary Refill Less than/equal to 3 seconds   Color Appropriate for race   Temperature Warm   Pedal Pulse Palpable   Wound Foot Left;Medial #7   Date First Assessed/Time First Assessed: 08/10/21 1108   Present on Hospital Admission: Yes  Location: Foot  Wound Location Orientation: Left;Medial  Wound Description: #7   Wound Image    Wound Length (cm) 0.1 cm   Wound Width (cm) 0.5 cm   Wound Depth (cm) 0.2 cm   Wound Surface Area (cm^2) 0.05 cm^2   Wound Volume (cm^3) 0.01 cm^3   Wound Assessment Slough;Pink/red   Drainage Amount Small   Drainage Description Serous   Wound Odor None   Maribel-Wound/Incision Assessment Intact   Wound Foot Left;Lateral;Proximal #5   Date First Assessed/Time First Assessed: 08/03/21 1057   Present on Hospital Admission: Yes  Location: Foot  Wound Location Orientation: Left;Lateral;Proximal  Wound Description: #5   Wound Image    Wound Length (cm) 1.8 cm   Wound Width (cm) 1.6 cm   Wound Depth (cm) 0.2 cm   Wound Surface Area (cm^2) 2.88 cm^2   Change in Wound Size % -38.46   Wound Volume (cm^3) 0.576 cm^3   Wound Healing % -38   Wound Assessment Slough;Eschar moist   Drainage Amount Moderate   Drainage Description Serous   Wound Odor None   Maribel-Wound/Incision Assessment Intact   Edges Flat/open edges   Wound Heel Left;Medial #6   Date First Assessed/Time First Assessed: 08/03/21 1057   Present on Hospital Admission: Yes  Location: Heel  Wound Location Orientation: Left;Medial  Wound Description: #6   Wound Image    Wound Length (cm) 0.1 cm   Wound Width (cm) 0.1 cm   Wound Depth (cm) 0.1 cm   Wound Surface Area (cm^2) 0.01 cm^2   Change in Wound Size % 99.33   Wound Volume (cm^3) 0.001 cm^3   Wound Healing % 99   Wound Foot Right;Plantar #1   Date First Assessed/Time First Assessed: 01/12/21 0824   Present on Hospital Admission: Yes  Location: Foot  Wound Location Orientation: Right;Plantar  Wound Description: #1   Wound Image    Wound Length (cm) 0.1 cm   Wound Width (cm) 0.1 cm   Wound Depth (cm) 0.1 cm   Wound Surface Area (cm^2) 0.01 cm^2   Change in Wound Size % 99.41   Wound Volume (cm^3) 0.001 cm^3   Wound Healing % 100     Visit Vitals  BP (!) 178/99   Pulse 77   Temp 97.5 °F (36.4 °C)   Resp 16

## 2021-08-10 NOTE — DISCHARGE INSTRUCTIONS
Discharge Instructions for  [facility]  [address] [place], [address] [facility]  Telephone: [phone]    NAME:  Duke Tapia  YOB: 1980  DATE:  8/3/2021        [x] Home Healthcare: TO: [facility]        Wound Cleansing:   Do not scrub or use excessive force. Cleanse wound prior to applying a clean dressing with:    [] Normal Saline   [] Keep Wound Dry in Shower      [] Wound Cleanser (***)  [] May Shower at Discharge   [] cleanse with Nolene Fent and Nolene Fent baby shampoo lather leave 2-3 then rinse with water, pat dry and redress wound. Dressings:           Wound Location left lat foot and right lat plantar foot    Apply Primary Dressing:  medihoney gauze roll gauze tape                                          Left medial and lat distal wounds bilat heels  Apply Primary Dressing:  betadine wet to dry gauze roll gauze tape netting                                              []     Change dressing:   [] Daily      [x] Every Other Day   [] Three times per week  [] Once a week   [] Do Not Change Dressing     [] Other:    Off-Loading:   [x] Off-loading when [] walking  [x] in bed [] sitting    Dietary:  [x] Diet as tolerated: [] Diabetic Diet:   [x] Increase Protein: examples ( Meat, cheese, eggs, greek yogurt, premier protein drink, fish, nuts )   [] Other:    Return Appointment:  [] Wound assessment with Nurse at wound center in *** days     [] Return Appointment: With Dr. Xiomara Gillis  1 week   Electronically signed on 8/3/2021 at [address] [place] [address]: Should you experience any significant changes in your wound(s) or have questions about your wound care, please contact the [facility] at [address] [place] [address] 8:00 am - 4:30. If you need help with your wound outside these hours and cannot wait until we are again available, contact your PCP or go to the hospital emergency room.      PLEASE NOTE: IF YOU ARE UNABLE TO OBTAIN WOUND SUPPLIES, CONTINUE TO USE THE SUPPLIES YOU HAVE AVAILABLE UNTIL YOU ARE ABLE TO REACH US. IT IS MOST IMPORTANT TO KEEP THE WOUND COVERED AT ALL TIMES.      Physician Signature:_______________________  Dr. Rudi Moreauiver

## 2021-08-10 NOTE — WOUND CARE
Wound Center  Progress Note    Subjective:   Patient is for follow up of LE ulcer and now has new ulcers(s). Patient is doing well. No complaints of wound pain. He has had car issues and has not been able to make it to the wound center. He recently found a transportation shuttle to bring him as his car conts to be worked on. There have been no changes in patient's medical history in the interim. ROS:  No fever or chills. No rash. No pain at site of wound    Objective:   General: NAD  Psych: Cooperative, no anxiety or depression  Neuro: Alert, oriented to person/place/situation. Otherwise nonfocal.  Extremities: Bilateral mild pitting edema is noted. Skin color is normal.   Vascular exam:  No gross changes in pedal pulses. Capillary refill is intact, <3sec. Dermatologic:  Skin color appears normal for patient. Skin turgor is normal. Dystrophic nails are seen on the feet bilaterally. Ulcer Description:   Measurement: in cm pre/post debridement:  1.6 x 1.3 x 0.2 / same inc depth to 0.3 at base of L 5th metatarsal.  Still has wound plantar to R submet 5 but smaller in size & it calluses over. + wounds smaller vs deep tissue injuries medial L foot, B heel and distal L 5th met. Ulcer bed: Mixed Granular/Fibrotic    Periwound: Calloused, nontender  Exudate: Moderate amount Serous exudate  Odor:  -    Assessment:  Diabetes with foot ulcer  E11.621  Ulcer L 5th met L97.522   Ulcer R 5th met L97.512    Plan:    Dressing: MH/ gauze to 5th met wounds. Otherwise Cont BDW2D Frequency: every other day. Cont to strictly wear bunny boots and to not wear closed shoes. Re-inforced that the main concern is pressure combined with neuropathy LOPS and that we need to manage these with the padding/boots to try and treat and help prevent future ulcrations. Plan is reviewed with patient who expresses understanding. Questions were answered. Patient is to follow up with me in 1 wk. Kwadwo Mitchell, DPM        Ulcer assessment: Due to presence of necrotic tissue within the wound bed, ulcer requires debridement. Procedure: Debridement:   The indication for debridement was reviewed with patient. Risks of procedure (bleeding, infection, pain) were discussed with patient and consent signed. Questions were answered    Subcutaneous excisional debridement   Indication: to remove necrotic tissue/ devitalized tissue/ soft eschar/ infected tissue/obtain deep wound culture through subcutaneous layer of wound bed  Consent in chart   Anesthesia: Topical 2% lidocaine jelly  Instrument: 15 Blade,    Residual Necrosis: Present and scored   Bleeding: <1ml   Hemostasis: Pressure   Patient tolerated procedure well   Procedural Pain: none  Post - procedural pain: none    Post debridement measurements: see progress note.   Surface area debrided: <20 sq. cm

## 2021-09-14 ENCOUNTER — HOSPITAL ENCOUNTER (OUTPATIENT)
Dept: WOUND CARE | Age: 41
Discharge: HOME | End: 2021-09-14
Payer: MEDICARE

## 2021-09-14 VITALS
SYSTOLIC BLOOD PRESSURE: 142 MMHG | DIASTOLIC BLOOD PRESSURE: 96 MMHG | TEMPERATURE: 97.1 F | HEART RATE: 85 BPM | RESPIRATION RATE: 16 BRPM

## 2021-09-14 PROCEDURE — 11042 DBRDMT SUBQ TIS 1ST 20SQCM/<: CPT

## 2021-09-14 NOTE — WOUND CARE
09/14/21 1205   Wound Heel Left #8   Date First Assessed/Time First Assessed: 09/14/21 1126   Present on Hospital Admission: Yes  Location: Heel  Wound Location Orientation: Left  Wound Description: #8   Dressing/Treatment Betadine swabs/Povidone Iodine;Gauze dressing/dressing sponge;Roll gauze   Wound Heel Right #9   Date First Assessed/Time First Assessed: 09/14/21 1127   Present on Hospital Admission: Yes  Location: Heel  Wound Location Orientation: Right  Wound Description: #9   Dressing/Treatment Betadine swabs/Povidone Iodine;Gauze dressing/dressing sponge;Roll gauze   Wound Foot Left;Medial #7   Date First Assessed/Time First Assessed: 08/10/21 1108   Present on Hospital Admission: Yes  Location: Foot  Wound Location Orientation: Left;Medial  Wound Description: #7   Dressing/Treatment Honey gel/honey paste;Gauze dressing/dressing sponge;Roll gauze   Wound Foot Left;Lateral;Proximal #5   Date First Assessed/Time First Assessed: 08/03/21 1057   Present on Hospital Admission: Yes  Location: Foot  Wound Location Orientation: Left;Lateral;Proximal  Wound Description: #5   Dressing/Treatment Honey gel/honey paste;Gauze dressing/dressing sponge;Roll gauze   Wound Foot Right;Plantar #1   Date First Assessed/Time First Assessed: 01/12/21 0824   Present on Hospital Admission: Yes  Location: Foot  Wound Location Orientation: Right;Plantar  Wound Description: #1   Dressing/Treatment Honey gel/honey paste;Gauze dressing/dressing sponge;Roll gauze   Wound Leg lower Right; Anterior #10   Date First Assessed/Time First Assessed: 09/14/21 1135   Present on Hospital Admission: Yes  Location: Leg lower  Wound Location Orientation: Right; Anterior  Wound Description: #10   Dressing/Treatment Honey gel/honey paste; Foam   Discharge Condition: Stable     Pain: 2    Ambulatory Status: Wheelchair     Discharge Destination: Home     Transportation: Car    Accompanied by: Self     Discharge instructions reviewed with Patient and copy or written instructions have been provided. All questions/concerns have been addressed at this time.

## 2021-09-14 NOTE — DISCHARGE INSTRUCTIONS
Discharge Instructions for  [facility]  [address] [place], [address] [facility]  Telephone: [phone]    NAME:  Lamar Castro  YOB: 1980  DATE:  8/10/2021        [x] Home Healthcare: TO: [facility]        Wound Cleansing:   Do not scrub or use excessive force. Cleanse wound prior to applying a clean dressing with:    [] Normal Saline   [] Keep Wound Dry in Shower      [] Wound Cleanser (***)  [] May Shower at Discharge   [] cleanse with Mery Fabry and Mery Fabry baby shampoo lather leave 2-3 then rinse with water, pat dry and redress wound. Dressings:           Wound Location left lat foot and right lat plantar foot    Apply Primary Dressing:  medihoney gauze roll gauze tape                                          Left medial and lat distal wounds bilat heels  Apply Primary Dressing:  betadine wet to dry gauze roll gauze tape netting                                              []     Change dressing:   [] Daily      [x] Every Other Day   [] Three times per week  [] Once a week   [] Do Not Change Dressing     [] Other:    Off-Loading:   [x] Off-loading when [] walking  [x] in bed [] sitting    Dietary:  [x] Diet as tolerated: [] Diabetic Diet:   [x] Increase Protein: examples ( Meat, cheese, eggs, greek yogurt, premier protein drink, fish, nuts )   [] Other:    Return Appointment:  [] Wound assessment with Nurse at wound center in *** days     [x] Return Appointment: With Dr. Rudi Prabhakar  1 week   Electronically signed on 8/10/2021     [address] [place] [address] Information: Should you experience any significant changes in your wound(s) or have questions about your wound care, please contact the [facility] at [address] [place] [address] 8:00 am - 4:30. If you need help with your wound outside these hours and cannot wait until we are again available, contact your PCP or go to the hospital emergency room.      PLEASE NOTE: IF YOU ARE UNABLE TO OBTAIN WOUND SUPPLIES, CONTINUE TO USE THE SUPPLIES YOU HAVE AVAILABLE UNTIL YOU ARE ABLE TO REACH US. IT IS MOST IMPORTANT TO KEEP THE WOUND COVERED AT ALL TIMES.      Physician Signature:_______________________  Dr. Brett Mckeon

## 2021-09-14 NOTE — WOUND CARE
Wound Center  Progress Note    Subjective:   Patient is for follow up of LE ulcer and now has new ulcers(s). Patient is doing well. No complaints of wound pain. He has had car issues and has not been able to make it to the wound center. He recently found a transportation shuttle to bring him as his car conts to be worked on. There have been no changes in patient's medical history in the interim. ROS:  No fever or chills. No rash. No pain at site of wound    Objective:   General: NAD  Psych: Cooperative, no anxiety or depression  Neuro: Alert, oriented to person/place/situation. Otherwise nonfocal.  Extremities: Bilateral mild pitting edema is noted. Skin color is normal.   Vascular exam:  No gross changes in pedal pulses. Capillary refill is intact, <3sec. Dermatologic:  Skin color appears normal for patient. Skin turgor is normal. Dystrophic nails are seen on the feet bilaterally. Ulcer Description:   Measurement: in cm pre/post debridement:  2.0 x 1.6 x 0.3 / same inc depth to 0.4 at base of L 5th metatarsal.  Still has wound plantar to R submet 5 but smaller in size & it calluses over. + wounds smaller vs deep tissue injuries medial L foot, B heel and distal L 5th met, now additional anterior R leg. Ulcer bed: Mixed Granular/Fibrotic    Periwound: Calloused, nontender  Exudate: Moderate amount Serous exudate  Odor:  -    Assessment:  Diabetes with foot ulcer  E11.621  Ulcer L 5th met L97.522   Ulcer R 5th met L97.512  Ulcer heels    Plan:    Dressing: MH/ gauze to 5th met wounds. Otherwise Cont BDW2D Frequency: every other day. Cont to strictly wear bunny boots and to not wear closed shoes. Re-inforced that the main concern is pressure combined with neuropathy LOPS and that we need to manage these with the padding/boots to try and treat and help prevent future ulcrations. Plan is reviewed with patient who expresses understanding. Questions were answered.   Patient is to follow up with me in 1 wk. Kwadwo Jones DPM        Ulcer assessment: Due to presence of necrotic tissue within the wound bed, ulcer requires debridement. Procedure: Debridement:   The indication for debridement was reviewed with patient. Risks of procedure (bleeding, infection, pain) were discussed with patient and consent signed. Questions were answered    Subcutaneous excisional debridement   Indication: to remove necrotic tissue/ devitalized tissue/ soft eschar/ infected tissue/obtain deep wound culture through subcutaneous layer of wound bed  Consent in chart   Anesthesia: Topical 2% lidocaine jelly  Instrument: 15 Blade,    Residual Necrosis: Present and scored   Bleeding: <1ml   Hemostasis: Pressure   Patient tolerated procedure well   Procedural Pain: none  Post - procedural pain: none    Post debridement measurements: see progress note.   Surface area debrided: <20 sq. cm

## 2021-09-14 NOTE — WOUND CARE
09/14/21 1127   Right Leg Edema Point of Measurement   Leg circumference 29 cm   Ankle circumference 21 cm   Left Leg Edema Point of Measurement   Leg circumference 28 cm   Ankle circumference 21 cm   LLE Peripheral Vascular    Color Appropriate for race   Temperature Warm   RLE Peripheral Vascular    Color Appropriate for race   Temperature Warm   Wound Heel Left #8   Date First Assessed/Time First Assessed: 09/14/21 1126   Present on Hospital Admission: Yes  Location: Heel  Wound Location Orientation: Left  Wound Description: #8   Wound Image    Wound Length (cm) 2 cm   Wound Width (cm) 3.8 cm   Wound Depth (cm) 0.1 cm   Wound Surface Area (cm^2) 7.6 cm^2   Wound Volume (cm^3) 0.76 cm^3   Wound Assessment Pink/red   Drainage Amount Moderate   Drainage Description Serosanguinous   Wound Odor None   Wound Heel Right #9   Date First Assessed/Time First Assessed: 09/14/21 1127   Present on Hospital Admission: Yes  Location: Heel  Wound Location Orientation: Right  Wound Description: #9   Wound Image    Wound Length (cm) 2 cm   Wound Width (cm) 1.8 cm   Wound Depth (cm) 0.1 cm   Wound Surface Area (cm^2) 3.6 cm^2   Wound Volume (cm^3) 0.36 cm^3   Wound Assessment Pink/red   Drainage Amount Moderate   Drainage Description Serosanguinous   Wound Odor None   Maribel-Wound/Incision Assessment Intact   Wound Foot Left;Medial #7   Date First Assessed/Time First Assessed: 08/10/21 1108   Present on Hospital Admission: Yes  Location: Foot  Wound Location Orientation: Left;Medial  Wound Description: #7   Wound Image    Wound Length (cm) 0.8 cm   Wound Width (cm) 0.3 cm   Wound Depth (cm) 0.1 cm   Wound Surface Area (cm^2) 0.24 cm^2   Change in Wound Size % -380   Wound Volume (cm^3) 0.024 cm^3   Wound Healing % -140   Wound Assessment Pink/red;Slough  (dried exudate)   Drainage Amount Small   Drainage Description Serosanguinous   Wound Odor None   Wound Foot Left;Lateral;Proximal #5   Date First Assessed/Time First Assessed: 08/03/21 1057   Present on Hospital Admission: Yes  Location: Foot  Wound Location Orientation: Left;Lateral;Proximal  Wound Description: #5   Wound Image    Wound Length (cm) 2 cm   Wound Width (cm) 1.6 cm   Wound Depth (cm) 0.3 cm   Wound Surface Area (cm^2) 3.2 cm^2   Change in Wound Size % -53.85   Wound Volume (cm^3) 0.96 cm^3   Wound Healing % -131   Wound Assessment Eschar moist   Drainage Amount Moderate   Drainage Description Serosanguinous   Wound Odor None   Wound Heel Left;Medial #6   Date First Assessed/Time First Assessed: 08/03/21 1057   Present on Hospital Admission: Yes  Location: Heel  Wound Location Orientation: Left;Medial  Wound Description: #6   Wound Image    Wound Length (cm) 0 cm   Wound Width (cm) 0 cm   Wound Depth (cm) 0 cm   Wound Surface Area (cm^2) 0 cm^2   Change in Wound Size % 100   Wound Volume (cm^3) 0 cm^3   Wound Healing % 100   Drainage Amount None   Wound Odor None   Wound Foot Right;Plantar #1   Date First Assessed/Time First Assessed: 01/12/21 0824   Present on Hospital Admission: Yes  Location: Foot  Wound Location Orientation: Right;Plantar  Wound Description: #1   Wound Image    Wound Length (cm) 0.1 cm   Wound Width (cm) 0.1 cm   Wound Depth (cm) 0.1 cm   Wound Surface Area (cm^2) 0.01 cm^2   Change in Wound Size % 99.41   Wound Volume (cm^3) 0.001 cm^3   Wound Healing % 100   Wound Assessment Other (Comment)  (calloused)   Drainage Amount None   Wound Odor None   Maribel-Wound/Incision Assessment Other (Comment)  (calloused)   Wound Leg lower Right; Anterior #10   Date First Assessed/Time First Assessed: 09/14/21 1135   Present on Hospital Admission: Yes  Location: Leg lower  Wound Location Orientation: Right; Anterior  Wound Description: #10   Wound Image    Wound Length (cm) 1 cm   Wound Width (cm) 1.6 cm   Wound Depth (cm) 0.1 cm   Wound Surface Area (cm^2) 1.6 cm^2   Wound Volume (cm^3) 0.16 cm^3   Drainage Amount Moderate   Drainage Description Serosanguinous   Wound Odor None   Maribel-Wound/Incision Assessment Intact     Visit Vitals  BP (!) 142/96   Pulse 85   Temp 97.1 °F (36.2 °C)   Resp 16

## 2021-09-21 ENCOUNTER — HOSPITAL ENCOUNTER (OUTPATIENT)
Dept: WOUND CARE | Age: 41
Discharge: HOME | End: 2021-09-21
Payer: MEDICARE

## 2021-09-21 VITALS
RESPIRATION RATE: 18 BRPM | TEMPERATURE: 98.6 F | SYSTOLIC BLOOD PRESSURE: 183 MMHG | HEART RATE: 76 BPM | DIASTOLIC BLOOD PRESSURE: 128 MMHG

## 2021-09-21 PROCEDURE — 11042 DBRDMT SUBQ TIS 1ST 20SQCM/<: CPT

## 2021-09-21 NOTE — DISCHARGE INSTRUCTIONS
Discharge Instructions for  [facility]  [place] [address], [address] [facility]  Telephone: [phone]    NAME:  Adela Elliott  YOB: 1980  DATE:  8/10/2021    [x] Home Healthcare: TO: [facility]    Wound Cleansing:   Do not scrub or use excessive force. Cleanse wound prior to applying a clean dressing with:    [] Normal Saline   [] Keep Wound Dry in Shower      [] Wound Cleanser (***)  [] May Shower at Discharge   [] cleanse with Chelsie Marine and Chelsie Marine baby shampoo lather leave 2-3 then rinse with water, pat dry and redress wound. Dressings:           Wound Location left lateral foot, right anterior leg, left medial foot    Apply Primary Dressing:  medihoney gauze roll gauze tape    Dressings:                  Wound Location Left and Right bilateral heels    Apply Primary Dressing:  betadine wet to dry gauze roll gauze tape netting                                                Change dressing:   [] Daily      [x] Every Other Day   [] Three times per week  [] Once a week   [] Do Not Change Dressing     [] Other:    Off-Loading:   [x] Off-loading when [] walking  [x] in bed [] sitting  Need to continue to wear bunny boots for off loading    Dietary:  [x] Diet as tolerated: [] Diabetic Diet:   [x] Increase Protein: examples ( Meat, cheese, eggs, greek yogurt, premier protein drink, fish, nuts )   [] Other:    Return Appointment:  [] Wound assessment with Nurse at wound center in *** days     [x] Return Appointment: With Dr. Malik Moreno  1 week     Electronically signed on 8/10/2021     [address] [place] [address] Information: Should you experience any significant changes in your wound(s) or have questions about your wound care, please contact the [facility] Main at [address] [place] [address] 8:00 am - 4:30.   If you need help with your wound outside these hours and cannot wait until we are again available, contact your PCP or go to the [facility] emergency room. PLEASE NOTE: IF YOU ARE UNABLE TO OBTAIN WOUND SUPPLIES, CONTINUE TO USE THE SUPPLIES YOU HAVE AVAILABLE UNTIL YOU ARE ABLE TO REACH US. IT IS MOST IMPORTANT TO KEEP THE WOUND COVERED AT ALL TIMES.      Physician Signature:_______________________  Dr. Xiomara Gillis

## 2021-09-21 NOTE — WOUND CARE
Wound Center  Progress Note    Subjective:   Patient is for follow up of LE ulcer and now has new ulcers(s). Patient is doing well. No complaints of wound pain. He has had car issues and has not been able to make it to the wound center. He recently found a transportation shuttle to bring him as his car conts to be worked on. There have been no changes in patient's medical history in the interim. ROS:  No fever or chills. No rash. No pain at site of wound    Objective:   General: NAD  Psych: Cooperative, no anxiety or depression  Neuro: Alert, oriented to person/place/situation. Otherwise nonfocal.  Extremities: Bilateral mild pitting edema is noted. Skin color is normal.   Vascular exam:  No gross changes in pedal pulses. Capillary refill is intact, <3sec. Dermatologic:  Skin color appears normal for patient. Skin turgor is normal. Dystrophic nails are seen on the feet bilaterally. Ulcer Description:   Measurement: in cm pre/post debridement:  1.8 x 1.6 x 0.3 / same inc depth to 0.4 at base of L 5th metatarsal.  Still has wound plantar to R submet 5 but smaller in size & it calluses over. + wounds smaller vs deep tissue injuries medial L foot, B heel and distal L 5th met,  anterior R leg see nursing measurements. Ulcer bed: Mixed Granular/Fibrotic    Periwound: Calloused, nontender  Exudate: Moderate amount Serous exudate  Odor:  -    Assessment:  Diabetes with foot ulcer  E11.621  Ulcer L 5th met L97.522   Ulcer R 5th met L97.512  Ulcer heels    Plan:    Dressing: MH/ gauze to 5th met wounds. Otherwise Cont BDW2D Frequency: every other day. Cont to strictly wear bunny boots and to not wear closed shoes. Re-inforced that the main concern is pressure combined with neuropathy LOPS and that we need to manage these with the padding/boots to try and treat and help prevent future ulcrations. Plan is reviewed with patient who expresses understanding. Questions were answered.   Patient is to follow up with me in 1 wk. Mark A. Irl Krabbe, DPM        Ulcer assessment: Due to presence of necrotic tissue within the wound bed, ulcer requires debridement. Procedure: Debridement:   The indication for debridement was reviewed with patient. Risks of procedure (bleeding, infection, pain) were discussed with patient and consent signed. Questions were answered    Subcutaneous excisional debridement   Indication: to remove necrotic tissue/ devitalized tissue/ soft eschar/ infected tissue/obtain deep wound culture through subcutaneous layer of wound bed  Consent in chart   Anesthesia: Topical 2% lidocaine jelly  Instrument: 15 Blade,    Residual Necrosis: Present and scored   Bleeding: <1ml   Hemostasis: Pressure   Patient tolerated procedure well   Procedural Pain: none  Post - procedural pain: none    Post debridement measurements: see progress note.   Surface area debrided: <20 sq. cm

## 2021-09-21 NOTE — PROGRESS NOTES
09/21/21 1124   Right Leg Edema Point of Measurement   Leg circumference 30.3 cm   Ankle circumference 21 cm   Left Leg Edema Point of Measurement   Leg circumference 32.3 cm   Ankle circumference 22 cm   LLE Peripheral Vascular    Capillary Refill Less than/equal to 3 seconds   Color Appropriate for race   Temperature Warm   Pedal Pulse Palpable   RLE Peripheral Vascular    Capillary Refill Less than/equal to 3 seconds   Color Appropriate for race   Temperature Warm   Pedal Pulse Palpable   Wound Heel Left #8   Date First Assessed/Time First Assessed: 09/14/21 1126   Present on Hospital Admission: Yes  Location: Heel  Wound Location Orientation: Left  Wound Description: #8   Wound Image    Wound Length (cm) 1.1 cm   Wound Width (cm) 1.4 cm   Wound Depth (cm) 0.2 cm   Wound Surface Area (cm^2) 1.54 cm^2   Change in Wound Size % 79.74   Wound Volume (cm^3) 0.308 cm^3   Wound Healing % 59   Wound Assessment [place]/red;Slough   Drainage Amount Moderate   Drainage Description Serosanguinous   Wound Odor None   Maribel-Wound/Incision Assessment Intact   Edges Flat/open edges   Wound Heel Right #9   Date First Assessed/Time First Assessed: 09/14/21 1127   Present on Hospital Admission: Yes  Location: Heel  Wound Location Orientation: Right  Wound Description: #9   Wound Image    Wound Length (cm) 0.5 cm   Wound Width (cm) 0.3 cm   Wound Depth (cm) 0.3 cm   Wound Surface Area (cm^2) 0.15 cm^2   Change in Wound Size % 95.83   Wound Volume (cm^3) 0.045 cm^3   Wound Healing % 88   Wound Assessment [place]/red;Slough   Drainage Amount Moderate   Drainage Description Sanguineous   Wound Odor None   Maribel-Wound/Incision Assessment Ecchymosis   Edges Flat/open edges   Wound Leg lower Right; Anterior #10   Date First Assessed/Time First Assessed: 09/14/21 1135   Present on Hospital Admission: Yes  Location: Leg lower  Wound Location Orientation: Right; Anterior  Wound Description: #10   Wound Image    Wound Length (cm) 1.2 cm   Wound Width (cm) 0.6 cm   Wound Depth (cm) 0.1 cm   Wound Surface Area (cm^2) 0.72 cm^2   Change in Wound Size % 55   Wound Volume (cm^3) 0.072 cm^3   Wound Healing % 55   Wound Assessment [place]/red;Slough   Drainage Amount Moderate   Drainage Description Serosanguinous   Wound Odor None   Maribel-Wound/Incision Assessment Intact   Edges Flat/open edges   Wound Foot Left;Medial #7   Date First Assessed/Time First Assessed: 08/10/21 1108   Present on Hospital Admission: Yes  Location: Foot  Wound Location Orientation: Left;Medial  Wound Description: #7   Wound Image    Wound Length (cm) 0.4 cm   Wound Width (cm) 0.4 cm   Wound Depth (cm) 0.1 cm   Wound Surface Area (cm^2) 0.16 cm^2   Change in Wound Size % -220   Wound Volume (cm^3) 0.016 cm^3   Wound Healing % -60   Wound Assessment Other (Comment)  (dried exudate)   Drainage Amount Small   Drainage Description Serosanguinous   Wound Odor None   Maribel-Wound/Incision Assessment Dry/flaky; Intact   Edges Flat/open edges   Wound Foot Left;Lateral;Proximal #5   Date First Assessed/Time First Assessed: 08/03/21 1057   Present on Hospital Admission: Yes  Location: Foot  Wound Location Orientation: Left;Lateral;Proximal  Wound Description: #5   Wound Image    Wound Length (cm) 1.6 cm   Wound Width (cm) 1.8 cm   Wound Depth (cm) 0.3 cm   Wound Surface Area (cm^2) 2.88 cm^2   Change in Wound Size % -38.46   Wound Volume (cm^3) 0.864 cm^3   Wound Healing % -108   Wound Assessment Slough;Pink/red   Drainage Amount Moderate   Drainage Description Serosanguinous   Wound Odor None   Maribel-Wound/Incision Assessment Maceration   Edges Other (Comment)  (hyperpigmented)   Wound Foot Right;Plantar #1   Date First Assessed/Time First Assessed: 01/12/21 0824   Present on Hospital Admission: Yes  Location: Foot  Wound Location Orientation: Right;Plantar  Wound Description: #1   Wound Image    Wound Length (cm) 0.1 cm   Wound Width (cm) 0.1 cm   Wound Depth (cm) 0.1 cm   Wound Surface Area (cm^2) 0.01 cm^2   Change in Wound Size % 99.41   Wound Volume (cm^3) 0.001 cm^3   Wound Healing % 100   Wound Assessment Other (Comment)  (calloused)   Drainage Amount None   Wound Odor None   Maribel-Wound/Incision Assessment Other (Comment)  (calloused)     Visit Vitals  BP (!) 183/128 (BP 1 Location: Right upper arm, BP Patient Position: At rest) Comment: bowel program scheduled today; will re-take at end of visit   Pulse 76   Temp 98.6 °F (37 °C)   Resp 18

## 2021-09-28 ENCOUNTER — HOSPITAL ENCOUNTER (OUTPATIENT)
Dept: WOUND CARE | Age: 41
Discharge: HOME | End: 2021-09-28
Payer: MEDICARE

## 2021-09-28 VITALS — SYSTOLIC BLOOD PRESSURE: 100 MMHG | DIASTOLIC BLOOD PRESSURE: 55 MMHG | TEMPERATURE: 98.6 F

## 2021-09-28 PROCEDURE — 11042 DBRDMT SUBQ TIS 1ST 20SQCM/<: CPT

## 2021-09-28 NOTE — DISCHARGE INSTRUCTIONS
Discharge Instructions for  [facility]  [place] [address] [place], [address] [facility]  Telephone: [phone]    NAME:  Geraldine Green  YOB: 1980  DATE:  9/21/2021    [x] Home Healthcare: TO: [facility]    Wound Cleansing:   Do not scrub or use excessive force. Cleanse wound prior to applying a clean dressing with:    [] Normal Saline   [] Keep Wound Dry in Shower      [] Wound Cleanser (***)  [] May Shower at Discharge   [] cleanse with Sueanne Lydia and Sueanne Lydia baby shampoo lather leave 2-3 then rinse with water, pat dry and redress wound. Dressings:           Wound Location left lateral foot, right anterior leg, left medial foot, right plantar foot    Apply Primary Dressing:  medihoney gauze roll gauze tape    Dressings:                  Wound Location Left and Right bilateral heels    Apply Primary Dressing:  betadine wet to dry gauze roll gauze tape netting                                                Change dressing:   [] Daily      [x] Every Other Day   [] Three times per week  [] Once a week   [] Do Not Change Dressing     [] Other:    Off-Loading:   [x] Off-loading when [] walking  [x] in bed [] sitting  Need to continue to wear bunny boots for off loading    Dietary:  [x] Diet as tolerated: [] Diabetic Diet:   [x] Increase Protein: examples ( Meat, cheese, eggs, greek yogurt, premier protein drink, fish, nuts )   [] Other:    Return Appointment:  [] Wound assessment with Nurse at wound center in *** days     [x] Return Appointment: With Dr. Judy Aguirre  1 week     Electronically signed on 9/21/2021     [address] [place] [address] Information: Should you experience any significant changes in your wound(s) or have questions about your wound care, please contact the [facility] Main at [address] [place] [address] 8:00 am - 4:30.   If you need help with your wound outside these hours and cannot wait until we are again available, contact your PCP or go to the hospital emergency room. PLEASE NOTE: IF YOU ARE UNABLE TO OBTAIN WOUND SUPPLIES, CONTINUE TO USE THE SUPPLIES YOU HAVE AVAILABLE UNTIL YOU ARE ABLE TO REACH US. IT IS MOST IMPORTANT TO KEEP THE WOUND COVERED AT ALL TIMES.      Physician Signature:_______________________  Dr. Marifer Brice

## 2021-09-28 NOTE — WOUND CARE
09/28/21 1153   Wound Foot Right;Plantar;Lateral;Mid #2   Date First Assessed/Time First Assessed: 09/28/21 1126   Present on Hospital Admission: Yes  Primary Wound Type: Blister/bullae  Location: Foot  Wound Location Orientation: Right;Plantar;Lateral;Mid  Wound Description: #2   Dressing/Treatment Honey gel/honey paste;Gauze dressing/dressing sponge;Tape change;Roll gauze   Offloading for Diabetic Foot Ulcers Offloading boot   Wound Foot Left;Medial #7   Date First Assessed/Time First Assessed: 08/10/21 1108   Present on Hospital Admission: Yes  Location: Foot  Wound Location Orientation: Left;Medial  Wound Description: #7   Dressing/Treatment Honey gel/honey paste;Gauze dressing/dressing sponge;Roll gauze;Tape/Soft cloth adhesive tape; Foam   Offloading for Diabetic Foot Ulcers Offloading boot   Wound Foot Left;Lateral;Proximal #5   Date First Assessed/Time First Assessed: 08/03/21 1057   Present on Hospital Admission: Yes  Location: Foot  Wound Location Orientation: Left;Lateral;Proximal  Wound Description: #5   Dressing/Treatment Honey gel/honey paste;Foam;Roll gauze;Tape/Soft cloth adhesive tape   Offloading for Diabetic Foot Ulcers Offloading boot   Wound Foot Right;Plantar #1   Date First Assessed/Time First Assessed: 01/12/21 0824   Present on Hospital Admission: Yes  Location: Foot  Wound Location Orientation: Right;Plantar  Wound Description: #1   Dressing/Treatment Honey gel/honey paste;Gauze dressing/dressing sponge;Roll gauze;Tape/Soft cloth adhesive tape   Offloading for Diabetic Foot Ulcers Offloading boot   Wound Heel Right #9   Date First Assessed/Time First Assessed: 09/14/21 1127   Present on Hospital Admission: Yes  Location: Heel  Wound Location Orientation: Right  Wound Description: #9   Dressing/Treatment Betadine swabs/Povidone Iodine;Gauze dressing/dressing sponge;Roll gauze;Tape/Soft cloth adhesive tape   Offloading for Diabetic Foot Ulcers Offloading boot   Wound Heel Left #8   Date First Assessed/Time First Assessed: 09/14/21 1126   Present on Hospital Admission: Yes  Location: Heel  Wound Location Orientation: Left  Wound Description: #8   Dressing/Treatment Betadine swabs/Povidone Iodine;Gauze dressing/dressing sponge;Tape/Soft cloth adhesive tape   Offloading for Diabetic Foot Ulcers Offloading boot   Wound Leg lower Right; Anterior #10   Date First Assessed/Time First Assessed: 09/14/21 1135   Present on Hospital Admission: Yes  Location: Leg lower  Wound Location Orientation: Right; Anterior  Wound Description: #10   Dressing/Treatment Honey gel/honey paste; Foam   Offloading for Diabetic Foot Ulcers Offloading boot   Discharge Condition: Stable     Pain: 2    Ambulatory Status: Wheelchair     Discharge Destination: Home     Transportation: Car    Accompanied by: Self     Discharge instructions reviewed with Patient and copy or written instructions have been provided. All questions/concerns have been addressed at this time.

## 2021-09-28 NOTE — WOUND CARE
Wound Center  Progress Note    Subjective:   Patient is for follow up of LE ulcer and now has new ulcers(s). Patient is doing well. No complaints of wound pain. He has had car issues and has not been able to make it to the wound center. He recently found a transportation shuttle to bring him as his car conts to be worked on. There have been no changes in patient's medical history in the interim. ROS:  No fever or chills. No rash. No pain at site of wound    Objective:   General: NAD  Psych: Cooperative, no anxiety or depression  Neuro: Alert, oriented to person/place/situation. Otherwise nonfocal.  Extremities: Bilateral mild pitting edema is noted. Skin color is normal.   Vascular exam:  No gross changes in pedal pulses. Capillary refill is intact, <3sec. Dermatologic:  Skin color appears normal for patient. Skin turgor is normal. Dystrophic nails are seen on the feet bilaterally. Ulcer Description:   Measurement: in cm pre/post debridement:  2.1 x 1.4 x 0.3 / same inc depth to 0.4 at base of L 5th metatarsal.  Still has wound plantar to R submet 5 but smaller in size & it calluses over. + wounds smaller vs deep tissue injuries medial L foot, B heel and distal L 5th met,  anterior R leg see nursing measurements. Ulcer bed: Mixed Granular/Fibrotic    Periwound: Calloused, nontender  Exudate: Moderate amount Serous exudate  Odor:  -    Assessment:  Diabetes with foot ulcer  E11.621  Ulcer L 5th met L97.522   Ulcer R 5th met L97.512  Ulcer heels    Plan:    Dressing: MH/ gauze to 5th met wounds. Otherwise Cont BDW2D to wounds/blistersFrequency: every other day. Cont to strictly wear bunny boots and to not wear closed shoes. Re-inforced that the main concern is pressure combined with neuropathy LOPS and that we need to manage these with the padding/boots to try and treat and help prevent future ulcrations. Plan is reviewed with patient who expresses understanding.   Questions were answered. Patient is to follow up with me in 1 wk. Kwadwo Parry DPM        Ulcer assessment: Due to presence of necrotic tissue within the wound bed, ulcer requires debridement. Procedure: Debridement:   The indication for debridement was reviewed with patient. Risks of procedure (bleeding, infection, pain) were discussed with patient and consent signed. Questions were answered    Subcutaneous excisional debridement   Indication: to remove necrotic tissue/ devitalized tissue/ soft eschar/ infected tissue/obtain deep wound culture through subcutaneous layer of wound bed  Consent in chart   Anesthesia: Topical 2% lidocaine jelly  Instrument: 15 Blade,    Residual Necrosis: Present and scored   Bleeding: <1ml   Hemostasis: Pressure   Patient tolerated procedure well   Procedural Pain: none  Post - procedural pain: none    Post debridement measurements: see progress note.   Surface area debrided: <20 sq. cm

## 2021-09-28 NOTE — WOUND CARE
09/28/21 1119   Right Leg Edema Point of Measurement   Leg circumference 34 cm   Ankle circumference 21 cm   Left Leg Edema Point of Measurement   Leg circumference 32 cm   Ankle circumference 22 cm   LLE Peripheral Vascular    Capillary Refill Less than/equal to 3 seconds   Color Appropriate for race   Temperature Cool   Pedal Pulse Palpable   RLE Peripheral Vascular    Capillary Refill Less than/equal to 3 seconds   Color Appropriate for race   Temperature Cool   Pedal Pulse Palpable   Wound Foot Right;Medial;Plantar #2   Date First Assessed/Time First Assessed: 09/28/21 1126   Present on Hospital Admission: Yes  Primary Wound Type: Blister/bullae  Location: Foot  Wound Location Orientation: Right;Medial;Plantar  Wound Description: #2   Wound Image    Wound Etiology Other (Comment)   Wound Length (cm) 3.1 cm   Wound Width (cm) 2.4 cm   Wound Depth (cm) 0 cm   Wound Surface Area (cm^2) 7.44 cm^2   Wound Volume (cm^3) 0 cm^3   Wound Assessment Purple/maroon;Blood filled blister   Drainage Amount None   Wound Odor None   Edges Attached edges   Wound Foot Left;Medial #7   Date First Assessed/Time First Assessed: 08/10/21 1108   Present on Hospital Admission: Yes  Location: Foot  Wound Location Orientation: Left;Medial  Wound Description: #7   Wound Image    Wound Length (cm) 0.4 cm   Wound Width (cm) 0.4 cm   Wound Depth (cm) 0.1 cm   Wound Surface Area (cm^2) 0.16 cm^2   Change in Wound Size % -220   Wound Volume (cm^3) 0.016 cm^3   Wound Healing % -60   Wound Assessment Purple/maroon;Slough   Drainage Amount Moderate   Drainage Description Serosanguinous   Wound Odor None   Maribel-Wound/Incision Assessment Edematous   Edges Flat/open edges   Wound Thickness Description Full thickness   Wound Foot Left;Lateral;Proximal #5   Date First Assessed/Time First Assessed: 08/03/21 1057   Present on Hospital Admission: Yes  Location: Foot  Wound Location Orientation: Left;Lateral;Proximal  Wound Description: #5   Wound Image Wound Length (cm) 2.1 cm   Wound Width (cm) 1.4 cm   Wound Depth (cm) 0.3 cm   Wound Surface Area (cm^2) 2.94 cm^2   Change in Wound Size % -41.35   Wound Volume (cm^3) 0.882 cm^3   Wound Healing % -112   Wound Assessment Slough;Pink/red   Drainage Amount Moderate   Drainage Description Serosanguinous   Wound Odor None   Maribel-Wound/Incision Assessment Edematous   Edges Attached edges;Epibole (rolled edges)   Wound Thickness Description Full thickness   Wound Foot Right;Plantar #1   Date First Assessed/Time First Assessed: 01/12/21 0824   Present on Hospital Admission: Yes  Location: Foot  Wound Location Orientation: Right;Plantar  Wound Description: #1   Wound Image    Wound Length (cm) 0.8 cm   Wound Width (cm) 0.4 cm   Wound Depth (cm) 0.1 cm   Wound Surface Area (cm^2) 0.32 cm^2   Change in Wound Size % 81.18   Wound Volume (cm^3) 0.032 cm^3   Wound Healing % 96   Wound Assessment Pink/red;Purple/maroon   Drainage Amount None   Wound Odor None   Maribel-Wound/Incision Assessment Intact   Edges Flat/open edges   Wound Heel Right #9   Date First Assessed/Time First Assessed: 09/14/21 1127   Present on Hospital Admission: Yes  Location: Heel  Wound Location Orientation: Right  Wound Description: #9   Wound Image    Wound Length (cm) 0.2 cm   Wound Width (cm) 2 cm   Wound Depth (cm) 0.1 cm   Wound Surface Area (cm^2) 0.4 cm^2   Change in Wound Size % 88.89   Wound Volume (cm^3) 0.04 cm^3   Wound Healing % 89   Wound Assessment Slough;Pink/red   Drainage Amount Moderate   Drainage Description Serosanguinous   Wound Odor None   Maribel-Wound/Incision Assessment Hyperpigmented   Edges Flat/open edges   Wound Heel Left #8   Date First Assessed/Time First Assessed: 09/14/21 1126   Present on Hospital Admission: Yes  Location: Heel  Wound Location Orientation: Left  Wound Description: #8   Wound Image    Wound Length (cm) 1 cm   Wound Width (cm) 0.8 cm   Wound Depth (cm) 0.2 cm   Wound Surface Area (cm^2) 0.8 cm^2   Change in Wound Size % 89.47   Wound Volume (cm^3) 0.16 cm^3   Wound Healing % 79   Wound Assessment [place]/red;Slough   Drainage Amount Moderate   Drainage Description Serosanguinous   Wound Odor None   Edges Flat/open edges   Wound Leg lower Right; Anterior #10   Date First Assessed/Time First Assessed: 09/14/21 1135   Present on Hospital Admission: Yes  Location: Leg lower  Wound Location Orientation: Right; Anterior  Wound Description: #10   Wound Image    Wound Length (cm) 0.6 cm   Wound Width (cm) 0.5 cm   Wound Depth (cm) 0.1 cm   Wound Surface Area (cm^2) 0.3 cm^2   Change in Wound Size % 81.25   Wound Volume (cm^3) 0.03 cm^3   Wound Healing % 81   Wound Assessment Pink/red;Slough;Granulation tissue   Drainage Amount Small   Drainage Description Serosanguinous   Wound Odor None   Maribel-Wound/Incision Assessment Intact   Edges Flat/open edges     Visit Vitals  BP (!) 100/55   Temp 98.6 °F (37 °C)

## 2021-10-05 ENCOUNTER — HOSPITAL ENCOUNTER (OUTPATIENT)
Dept: WOUND CARE | Age: 41
Discharge: HOME | End: 2021-10-05
Payer: MEDICARE

## 2021-10-05 VITALS
TEMPERATURE: 97.9 F | RESPIRATION RATE: 17 BRPM | DIASTOLIC BLOOD PRESSURE: 93 MMHG | HEART RATE: 79 BPM | SYSTOLIC BLOOD PRESSURE: 153 MMHG

## 2021-10-05 PROCEDURE — 11042 DBRDMT SUBQ TIS 1ST 20SQCM/<: CPT

## 2021-10-05 PROCEDURE — 74011000250 HC RX REV CODE- 250: Performed by: PODIATRIST

## 2021-10-05 RX ADMIN — Medication: at 11:36

## 2021-10-05 NOTE — WOUND CARE
10/05/21 1209   Wound Foot Right;Plantar;Lateral;Mid #2   Date First Assessed/Time First Assessed: 09/28/21 1126   Present on Hospital Admission: Yes  Primary Wound Type: Blister/bullae  Location: Foot  Wound Location Orientation: Right;Plantar;Lateral;Mid  Wound Description: #2   Dressing/Treatment Betadine swabs/Povidone Iodine;ABD pad;Gauze dressing/dressing sponge;Roll gauze   Wound Heel Right #9   Date First Assessed/Time First Assessed: 09/14/21 1127   Present on Hospital Admission: Yes  Location: Heel  Wound Location Orientation: Right  Wound Description: #9   Dressing/Treatment Betadine swabs/Povidone Iodine;ABD pad;Gauze dressing/dressing sponge;Roll gauze   Wound Foot Left;Medial #7   Date First Assessed/Time First Assessed: 08/10/21 1108   Present on Hospital Admission: Yes  Location: Foot  Wound Location Orientation: Left;Medial  Wound Description: #7   Dressing/Treatment Foam;Gauze dressing/dressing sponge;Roll gauze   Wound Foot Left;Lateral;Proximal #5   Date First Assessed/Time First Assessed: 08/03/21 1057   Present on Hospital Admission: Yes  Location: Foot  Wound Location Orientation: Left;Lateral;Proximal  Wound Description: #5   Dressing/Treatment Honey gel/honey paste;Foam;Gauze dressing/dressing sponge;Roll gauze   Wound Foot Right;Plantar #1   Date First Assessed/Time First Assessed: 01/12/21 0824   Present on Hospital Admission: Yes  Location: Foot  Wound Location Orientation: Right;Plantar  Wound Description: #1   Dressing/Treatment Betadine swabs/Povidone Iodine;ABD pad;Gauze dressing/dressing sponge;Roll gauze   Discharge Condition: Stable     Pain: 2    Ambulatory Status: Wheelchair     Discharge Destination: Home     Transportation: Car    Accompanied by: Self     Discharge instructions reviewed with Patient and copy or written instructions have been provided. All questions/concerns have been addressed at this time.

## 2021-10-05 NOTE — DISCHARGE INSTRUCTIONS
· Discharge Instructions for  [facility]  [address] [place], [address] [facility]  Telephone: [phone]    NAME:  Itzel Velasquez  YOB: 1980  DATE:  9/28/2021    [x] Home Healthcare: TO: [facility]    Wound Cleansing:   Do not scrub or use excessive force. Cleanse wound prior to applying a clean dressing with:    [] Normal Saline   [] Keep Wound Dry in Shower      [] Wound Cleanser (***)  [] May Shower at Discharge   [] cleanse with Jadene Cam and Jadene Cam baby shampoo lather leave 2-3 then rinse with water, pat dry and redress wound.       Dressings:           Wound Location left lateral and medial foot, right anterior leg,  right plantar foot    Apply Primary Dressing:  medihoney gauze roll gauze tape    Dressings:                  Wound Location Left and Right bilateral heels, right lateral mid plantar blister area    Apply Primary Dressing:  betadine wet to dry gauze roll gauze tape netting                                               Foam cutout to left medial and lateral foot wounds, and right blister to lateral foot wound    Change dressing:   [] Daily      [x] Every Other Day   [] Three times per week  [] Once a week   [] Do Not Change Dressing     [] Other:    Off-Loading:   [x] Off-loading when [] walking  [x] in bed [] sitting  Need to continue to wear bunny boots for off loading    Dietary:  [x] Diet as tolerated: [] Diabetic Diet:   [x] Increase Protein: examples ( Meat, cheese, eggs, greek yogurt, premier protein drink, fish, nuts )   [] Other:    Return Appointment:  [] Wound assessment with Nurse at wound center in *** days     [x] Return Appointment: With Dr. Ottoniel Mina  1 week     Electronically signed on 9/28/2021     [address] Information: Should you experience any significant changes in your wound(s) or have questions about your wound care, please contact the [facility] Main at [facility] - FRIDAY 8:00 am - 4:30. If you need help with your wound outside these hours and cannot wait until we are again available, contact your PCP or go to the hospital emergency room. PLEASE NOTE: IF YOU ARE UNABLE TO OBTAIN WOUND SUPPLIES, CONTINUE TO USE THE SUPPLIES YOU HAVE AVAILABLE UNTIL YOU ARE ABLE TO REACH US. IT IS MOST IMPORTANT TO KEEP THE WOUND COVERED AT ALL TIMES.      Physician Signature:_______________________  Dr. Beni Talamantes

## 2021-10-05 NOTE — WOUND CARE
10/05/21 1108   Right Leg Edema Point of Measurement   Leg circumference 33 cm   Ankle circumference 23.5 cm   Left Leg Edema Point of Measurement   Leg circumference 33 cm   Ankle circumference 22.5 cm   LLE Peripheral Vascular    Capillary Refill Less than/equal to 3 seconds   Color Appropriate for race   Temperature Warm   Pedal Pulse Palpable   RLE Peripheral Vascular    Capillary Refill Less than/equal to 3 seconds   Color Appropriate for race   Temperature Warm   Pedal Pulse Palpable   Wound Foot Right;Plantar;Lateral;Mid #2   Date First Assessed/Time First Assessed: 09/28/21 1126   Present on Hospital Admission: Yes  Primary Wound Type: Blister/bullae  Location: Foot  Wound Location Orientation: Right;Plantar;Lateral;Mid  Wound Description: #2   Wound Image    Dressing Status Old drainage noted   Offloading for Diabetic Foot Ulcers Offloading boot   Wound Length (cm) 0 cm   Wound Width (cm) 0 cm   Wound Depth (cm) 0 cm   Wound Surface Area (cm^2) 0 cm^2   Change in Wound Size % 100   Wound Volume (cm^3) 0 cm^3   Wound Assessment Epithelialization   Drainage Amount None   Wound Odor None   Maribel-Wound/Incision Assessment Intact   Edges Attached edges   Wound Heel Left #8   Date First Assessed/Time First Assessed: 09/14/21 1126   Present on Hospital Admission: Yes  Location: Heel  Wound Location Orientation: Left  Wound Description: #8   Wound Image    Dressing Status Old drainage noted   Offloading for Diabetic Foot Ulcers Offloading boot   Wound Length (cm) 0.6 cm   Wound Width (cm) 0.7 cm   Wound Depth (cm) 0.1 cm   Wound Surface Area (cm^2) 0.42 cm^2   Change in Wound Size % 94.47   Wound Volume (cm^3) 0.042 cm^3   Wound Healing % 94   Wound Assessment [place]/red;Slough   Drainage Amount Moderate   Drainage Description Serosanguinous   Wound Odor None   Maribel-Wound/Incision Assessment Hyperkeratosis (Callous)   Edges Flat/open edges   Wound Thickness Description Full thickness   Wound Heel Right #9   Date First Assessed/Time First Assessed: 09/14/21 1127   Present on Hospital Admission: Yes  Location: Heel  Wound Location Orientation: Right  Wound Description: #9   Wound Image      Offloading for Diabetic Foot Ulcers Offloading boot   Wound Length (cm) 9.5 cm   Wound Width (cm) 9 cm   Wound Depth (cm) 0.2 cm   Wound Surface Area (cm^2) 85.5 cm^2   Change in Wound Size % -2275   Wound Volume (cm^3) 17.1 cm^3   Wound Healing % -4650   Wound Assessment Purple/maroon;Slough   Drainage Amount Moderate   Drainage Description Serosanguinous   Wound Odor None   Maribel-Wound/Incision Assessment Edematous; Maceration   Edges Flat/open edges   Wound Thickness Description Full thickness   Wound Leg lower Right; Anterior #10   Date First Assessed/Time First Assessed: 09/14/21 1135   Present on Hospital Admission: Yes  Location: Leg lower  Wound Location Orientation: Right; Anterior  Wound Description: #10   Wound Image    Offloading for Diabetic Foot Ulcers Offloading boot   Wound Length (cm) 0 cm   Wound Width (cm) 0 cm   Wound Depth (cm) 0 cm   Wound Surface Area (cm^2) 0 cm^2   Change in Wound Size % 100   Wound Volume (cm^3) 0 cm^3   Wound Healing % 100   Wound Assessment Epithelialization   Drainage Amount None   Wound Odor None   Maribel-Wound/Incision Assessment Intact   Wound Foot Left;Medial #7   Date First Assessed/Time First Assessed: 08/10/21 1108   Present on Hospital Admission: Yes  Location: Foot  Wound Location Orientation: Left;Medial  Wound Description: #7   Wound Image    Offloading for Diabetic Foot Ulcers Offloading boot   Wound Length (cm) 0.4 cm   Wound Width (cm) 0.4 cm   Wound Depth (cm) 0.2 cm   Wound Surface Area (cm^2) 0.16 cm^2   Change in Wound Size % -220   Wound Volume (cm^3) 0.032 cm^3   Wound Healing % -220   Wound Assessment [place]/red;Slough   Drainage Amount Moderate   Drainage Description Serosanguinous   Wound Odor None   Maribel-Wound/Incision Assessment Edematous   Edges Flat/open edges   Wound Thickness Description Full thickness   Wound Foot Left;Lateral;Proximal #5   Date First Assessed/Time First Assessed: 08/03/21 1057   Present on Hospital Admission: Yes  Location: Foot  Wound Location Orientation: Left;Lateral;Proximal  Wound Description: #5   Wound Image    Offloading for Diabetic Foot Ulcers Offloading boot   Wound Length (cm) 2.5 cm   Wound Width (cm) 1.8 cm   Wound Depth (cm) 0.3 cm   Wound Surface Area (cm^2) 4.5 cm^2   Change in Wound Size % -116.35   Wound Volume (cm^3) 1.35 cm^3   Wound Healing % -225   Wound Assessment Slough;Eschar moist;Pink/red   Drainage Amount Moderate   Drainage Description Serosanguinous   Wound Odor None   Maribel-Wound/Incision Assessment Edematous   Edges Attached edges   Wound Thickness Description Full thickness   Wound Foot Right;Plantar #1   Date First Assessed/Time First Assessed: 01/12/21 0824   Present on Hospital Admission: Yes  Location: Foot  Wound Location Orientation: Right;Plantar  Wound Description: #1   Wound Image    Offloading for Diabetic Foot Ulcers Offloading boot   Wound Length (cm) 2.1 cm   Wound Width (cm) 1.8 cm   Wound Depth (cm) 0.2 cm   Wound Surface Area (cm^2) 3.78 cm^2   Change in Wound Size % -122.35   Wound Volume (cm^3) 0.756 cm^3   Wound Healing % 11   Wound Assessment [place]/red;Slough   Drainage Amount None   Wound Odor None   Maribel-Wound/Incision Assessment Maceration   Edges Flat/open edges   Wound Thickness Description Full thickness     Visit Vitals  BP (!) 153/93 (BP 1 Location: Right upper arm, BP Patient Position: Sitting)   Pulse 79   Temp 97.9 °F (36.6 °C)   Resp 17

## 2021-10-05 NOTE — WOUND CARE
Wound Center  Progress Note    Subjective:   Patient is for follow up of LE ulcer and now has new ulcers(s). Patient is doing well. No complaints of wound pain. He has had car issues and has not been able to make it to the wound center. He recently found a transportation shuttle to bring him as his car conts to be worked on. There have been no changes in patient's medical history in the interim. ROS:  No fever or chills. No rash. No pain at site of wound    Objective:   General: NAD  Psych: Cooperative, no anxiety or depression  Neuro: Alert, oriented to person/place/situation. Otherwise nonfocal.  Extremities: Bilateral mild pitting edema is noted. Skin color is normal.   Vascular exam:  No gross changes in pedal pulses. Capillary refill is intact, <3sec. Dermatologic:  Skin color appears normal for patient. Skin turgor is normal. Dystrophic nails are seen on the feet bilaterally. Ulcer Description:   Measurement: in cm pre/post debridement:  2.5 x 1.8 x 0.3 / same inc depth to 0.4 at base of L 5th metatarsal.  Still has wound plantar to R submet 5 but smaller in size & it calluses over. + wounds smaller vs deep tissue injuries medial L foot, B heel and distal L 5th met, see nursing measurements. 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 Otherwise Cont BDW2D to wounds/blistersFrequency: every other day. Cont to strictly wear bunny boots and to not wear closed shoes. Re-inforced that the main concern is pressure combined with neuropathy LOPS and that we need to manage these with the padding/boots to try and treat and help prevent future ulcrations. It does not seem like this is working. Order for OLI's B LE. Plan is reviewed with patient who expresses understanding. Questions were answered. Patient is to follow up with me in 1 wk. Mark A. Barnie Moritz, DPM        Ulcer assessment: Due to presence of necrotic tissue within the wound bed, ulcer requires debridement. Procedure: Debridement:   The indication for debridement was reviewed with patient. Risks of procedure (bleeding, infection, pain) were discussed with patient and consent signed. Questions were answered    Subcutaneous excisional debridement   Indication: to remove necrotic tissue/ devitalized tissue/ soft eschar/ infected tissue/obtain deep wound culture through subcutaneous layer of wound bed  Consent in chart   Anesthesia: Topical 2% lidocaine jelly  Instrument: 15 Blade,    Residual Necrosis: Present and scored   Bleeding: <1ml   Hemostasis: Pressure   Patient tolerated procedure well   Procedural Pain: none  Post - procedural pain: none    Post debridement measurements: see progress note.   Surface area debrided: <20 sq. cm

## 2021-10-12 ENCOUNTER — HOSPITAL ENCOUNTER (OUTPATIENT)
Dept: WOUND CARE | Age: 41
Discharge: HOME | End: 2021-10-12

## 2021-10-12 NOTE — DISCHARGE INSTRUCTIONS
· Discharge Instructions for  [facility]  [address] [place], [address] [facility]  Telephone: [phone]    NAME:  Deepa Rachel  YOB: 1980  DATE:  10/12/2021    [x] Home Healthcare: TO: [facility]    Wound Cleansing:   Do not scrub or use excessive force. Cleanse wound prior to applying a clean dressing with:    [] Normal Saline   [] Keep Wound Dry in Shower      [] Wound Cleanser (***)  [] May Shower at Discharge   [] cleanse with Seretha Rhody and Seretha Rhody baby shampoo lather leave 2-3 then rinse with water, pat dry and redress wound. 1- Dressings:           Wound Location left lateral and medial foot    Apply Primary Dressing:  medihoney gauze roll gauze tape    2- Dressings:                  Wound Location Left and Right bilateral heels, right plantar foot    Apply Primary Dressing:  betadine wet to dry gauze roll gauze tape netting                                               Foam cutout to left medial and lateral foot wounds,    Change dressing:   [] Daily      [x] Every Other Day   [] Three times per week  [] Once a week   [] Do Not Change Dressing     [] Other:    Off-Loading:   [x] Off-loading when [] walking  [x] in bed [] sitting  Need to continue to wear bunny boots for off loading    Dietary:  [x] Diet as tolerated: [] Diabetic Diet:   [x] Increase Protein: examples ( Meat, cheese, eggs, greek yogurt, premier protein drink, fish, nuts )   [] Other:    Return Appointment:  [] Wound assessment with Nurse at wound center in *** days     [x] Return Appointment: With Dr. Fabien Story 1 week         Will send referral to:  For formal OLI's bilaterally,  more multiple wound areas, DTI's to right and left heels    Dr. Miky Stubbs  Vascular and Interventional Radiology  [facility]  [address] , [address] [place] [address]  [phone]  Fax [phone]     Electronically signed on 10/12/2021     Wound Care Center Information: Should you experience any significant changes in your wound(s) or have questions about your wound care, please contact the [facility] Main at [address] [place] [address] 8:00 am - 4:30. If you need help with your wound outside these hours and cannot wait until we are again available, contact your PCP or go to the hospital emergency room. PLEASE NOTE: IF YOU ARE UNABLE TO OBTAIN WOUND SUPPLIES, CONTINUE TO USE THE SUPPLIES YOU HAVE AVAILABLE UNTIL YOU ARE ABLE TO REACH US. IT IS MOST IMPORTANT TO KEEP THE WOUND COVERED AT ALL TIMES.      Physician Signature:_______________________  Dr. Frances Lanier

## 2021-10-13 ENCOUNTER — HOSPITAL ENCOUNTER (OUTPATIENT)
Dept: WOUND CARE | Age: 41
Discharge: HOME | End: 2021-10-13
Payer: MEDICARE

## 2021-10-13 VITALS
RESPIRATION RATE: 18 BRPM | TEMPERATURE: 97.2 F | SYSTOLIC BLOOD PRESSURE: 196 MMHG | HEART RATE: 93 BPM | DIASTOLIC BLOOD PRESSURE: 108 MMHG

## 2021-10-13 PROCEDURE — 11042 DBRDMT SUBQ TIS 1ST 20SQCM/<: CPT
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
## 2021-10-13 NOTE — WOUND CARE
10/13/21 1055   Right Leg Edema Point of Measurement   Leg circumference 33 cm   Ankle circumference 25 cm   Left Leg Edema Point of Measurement   Leg circumference 30.2 cm   Ankle circumference 23.1 cm   LLE Peripheral Vascular    Capillary Refill Less than/equal to 3 seconds   Color Appropriate for race   Temperature Warm   Pedal Pulse Palpable   RLE Peripheral Vascular    Capillary Refill Less than/equal to 3 seconds   Color Appropriate for race   Temperature Warm   Pedal Pulse Palpable   Wound Foot Right;Plantar;Lateral;Mid #2   Date First Assessed/Time First Assessed: 09/28/21 1126   Present on Hospital Admission: Yes  Primary Wound Type: Blister/bullae  Location: Foot  Wound Location Orientation: Right;Plantar;Lateral;Mid  Wound Description: #2   Wound Image    Wound Etiology Deep Tissue/Injury   Wound Length (cm) 3 cm   Wound Width (cm) 2.9 cm   Wound Depth (cm) 0 cm   Wound Surface Area (cm^2) 8.7 cm^2   Change in Wound Size % -16.94   Wound Volume (cm^3) 0 cm^3   Drainage Amount None   Wound Odor None   Wound Heel Right #9   Date First Assessed/Time First Assessed: 09/14/21 1127   Present on Hospital Admission: Yes  Location: Heel  Wound Location Orientation: Right  Wound Description: #9   Wound Image    Wound Etiology Deep Tissue/Injury   Wound Length (cm) 7.5 cm   Wound Width (cm) 7 cm   Wound Depth (cm) 0 cm   Wound Surface Area (cm^2) 52.5 cm^2   Change in Wound Size % -1358.33   Wound Volume (cm^3) 0 cm^3   Wound Healing % 100   Wound Assessment Purple/maroon   Drainage Amount None   Wound Odor None   Wound Foot Right;Plantar #1   Date First Assessed/Time First Assessed: 01/12/21 0824   Present on Hospital Admission: Yes  Location: Foot  Wound Location Orientation: Right;Plantar  Wound Description: #1   Wound Image    Wound Length (cm) 1.7 cm   Wound Width (cm) 1.9 cm   Wound Depth (cm) 0.1 cm   Wound Surface Area (cm^2) 3.23 cm^2   Change in Wound Size % -90   Wound Volume (cm^3) 0.323 cm^3   Wound Healing % 62   Wound Assessment Pink/red;Eschar dry   Drainage Amount None   Wound Odor None   Wound Heel Left #8   Date First Assessed/Time First Assessed: 09/14/21 1126   Present on Hospital Admission: Yes  Location: Heel  Wound Location Orientation: Left  Wound Description: #8   Wound Image    Wound Length (cm) 1.5 cm   Wound Width (cm) 1.6 cm   Wound Depth (cm) 0.2 cm   Wound Surface Area (cm^2) 2.4 cm^2   Change in Wound Size % 68.42   Wound Volume (cm^3) 0.48 cm^3   Wound Healing % 37   Wound Assessment Other (Comment)  (dried exudate)   Drainage Amount Moderate   Drainage Description Serosanguinous   Wound Odor None   Maribel-Wound/Incision Assessment Blanchable erythema; Hyperpigmented   Wound Foot Left;Lateral;Proximal #5   Date First Assessed/Time First Assessed: 08/03/21 1057   Present on Hospital Admission: Yes  Location: Foot  Wound Location Orientation: Left;Lateral;Proximal  Wound Description: #5   Wound Image    Wound Length (cm) 2.5 cm   Wound Width (cm) 1.5 cm   Wound Depth (cm) 0.6 cm   Wound Surface Area (cm^2) 3.75 cm^2   Change in Wound Size % -80.29   Wound Volume (cm^3) 2.25 cm^3   Wound Healing % -441   Wound Assessment Slough;Eschar dry   Drainage Amount Moderate   Drainage Description Serosanguinous   Wound Odor None   Wound Foot Left;Medial #7   Date First Assessed/Time First Assessed: 08/10/21 1108   Present on Hospital Admission: Yes  Location: Foot  Wound Location Orientation: Left;Medial  Wound Description: #7   Wound Image    Wound Length (cm) 0.5 cm   Wound Width (cm) 0.4 cm   Wound Depth (cm) 0.2 cm   Wound Surface Area (cm^2) 0.2 cm^2   Change in Wound Size % -300   Wound Volume (cm^3) 0.04 cm^3   Wound Healing % -300   Wound Assessment Other (Comment);Pink/red  (dried exudate)   Drainage Amount Moderate   Drainage Description Serosanguinous   Wound Odor None   Maribel-Wound/Incision Assessment Hyperpigmented     Visit Vitals  BP (!) 196/108 (BP 1 Location: Left upper arm, BP Patient Position: Sitting)   Pulse 93   Temp 97.2 °F (36.2 °C)   Resp 18

## 2021-10-13 NOTE — WOUND CARE
10/13/21 1129   Wound Foot Right;Plantar;Lateral;Mid #2   Date First Assessed/Time First Assessed: 09/28/21 1126   Present on Hospital Admission: Yes  Primary Wound Type: Blister/bullae  Location: Foot  Wound Location Orientation: Right;Plantar;Lateral;Mid  Wound Description: #2   Dressing Status New dressing applied   Dressing/Treatment Foam;Roll gauze;Tape/Soft cloth adhesive tape   Wound Heel Right #9   Date First Assessed/Time First Assessed: 09/14/21 1127   Present on Hospital Admission: Yes  Location: Heel  Wound Location Orientation: Right  Wound Description: #9   Dressing/Treatment Moisturizing cream   Wound Foot Right;Plantar #1   Date First Assessed/Time First Assessed: 01/12/21 0824   Present on Hospital Admission: Yes  Location: Foot  Wound Location Orientation: Right;Plantar  Wound Description: #1   Dressing Status New dressing applied   Dressing/Treatment Foam;Roll gauze;Tape/Soft cloth adhesive tape   Wound Heel Left #8   Date First Assessed/Time First Assessed: 09/14/21 1126   Present on Hospital Admission: Yes  Location: Heel  Wound Location Orientation: Left  Wound Description: #8   Dressing/Treatment Moisturizing cream   Wound Foot Left;Lateral;Proximal #5   Date First Assessed/Time First Assessed: 08/03/21 1057   Present on Hospital Admission: Yes  Location: Foot  Wound Location Orientation: Left;Lateral;Proximal  Wound Description: #5   Dressing Status New dressing applied   Dressing/Treatment Honey gel/honey paste;Foam;Roll gauze;Tape/Soft cloth adhesive tape   Discharge Condition: Stable     Pain: 0    Ambulatory Status: Wheelchair     Discharge Destination: Work    Transportation: Transport    Accompanied by: Self     Discharge instructions reviewed with Patient and copy or written instructions have been provided. All questions/concerns have been addressed at this time.

## 2021-10-19 ENCOUNTER — HOSPITAL ENCOUNTER (OUTPATIENT)
Dept: WOUND CARE | Age: 41
Discharge: HOME | End: 2021-10-19
Payer: MEDICARE

## 2021-10-19 VITALS
DIASTOLIC BLOOD PRESSURE: 103 MMHG | TEMPERATURE: 98 F | RESPIRATION RATE: 18 BRPM | HEART RATE: 71 BPM | SYSTOLIC BLOOD PRESSURE: 152 MMHG

## 2021-10-19 PROCEDURE — 11042 DBRDMT SUBQ TIS 1ST 20SQCM/<: CPT
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
## 2021-10-19 NOTE — DISCHARGE INSTRUCTIONS
· Discharge Instructions for  [facility]  [address] [place], [address] [facility]  Telephone: [phone]    NAME:  Cristóbal Guerrero  YOB: 1980  DATE:  10/19/2021    [x] Home Healthcare: TO: [facility]    Wound Cleansing:   Do not scrub or use excessive force. Cleanse wound prior to applying a clean dressing with:    [] Normal Saline   [] Keep Wound Dry in Shower      [] Wound Cleanser (***)  [] May Shower at Discharge   [x] cleanse with Ary Muckle and Ary Muckle baby shampoo lather leave 2-3 then rinse with water, pat dry and redress wound. 1- Dressings:           Wound Location left lateral     Apply Primary Dressing:  medihoney gauze roll gauze tape    2- Dressings:                  Wound Location Left and Right bilateral heels, right plantar foot, medial foot wounds     Apply Primary Dressing:  A&D ointment, betadine wet to dry                                               Foam cutout to left medial and lateral foot wounds,    Change dressing:   [x] Daily      [] Every Other Day   [] Three times per week  [] Once a week   [] Do Not Change Dressing     [] Other:    Off-Loading:   [x] Off-loading when [] walking  [x] in bed [] sitting  Need to continue to wear bunny boots for off loading    Dietary:  [x] Diet as tolerated: [] Diabetic Diet:   [x] Increase Protein: examples ( Meat, cheese, eggs, greek yogurt, premier protein drink, fish, nuts )   [] Other:    Return Appointment:  [] Wound assessment with Nurse at wound center in *** days     [x] Return Appointment: With Dr. Krystal Milner 2 week      Appt scheduled for Monday October 25, 2021    Will send referral to:  For formal OLI's bilaterally,  more multiple wound areas, DTI's to right and left heels    Dr. Mark Lopez  Vascular and Interventional Radiology  [facility]  [address] [facility], [address] [place] [address]  [phone]  Fax [phone] Electronically signed on 10/19/2021     [address] [place] [address] Information: Should you experience any significant changes in your wound(s) or have questions about your wound care, please contact the [facility] at [address] [place] [address] 8:00 am - 4:30. If you need help with your wound outside these hours and cannot wait until we are again available, contact your PCP or go to the hospital emergency room. PLEASE NOTE: IF YOU ARE UNABLE TO OBTAIN WOUND SUPPLIES, CONTINUE TO USE THE SUPPLIES YOU HAVE AVAILABLE UNTIL YOU ARE ABLE TO REACH US. IT IS MOST IMPORTANT TO KEEP THE WOUND COVERED AT ALL TIMES.      Physician Signature:_______________________  Dr. Julio César Parker

## 2021-10-19 NOTE — WOUND CARE
10/19/21 1125   Right Leg Edema Point of Measurement   Leg circumference 31 cm   Ankle circumference 21 cm   Left Leg Edema Point of Measurement   Leg circumference 31 cm   Ankle circumference 20.7 cm   LLE Peripheral Vascular    Capillary Refill Less than/equal to 3 seconds   Color Appropriate for race   Temperature Cool   Pedal Pulse Palpable   RLE Peripheral Vascular    Capillary Refill Less than/equal to 3 seconds   Color Appropriate for race   Temperature Warm   Pedal Pulse Palpable   Wound Foot Left;Medial #7   Date First Assessed/Time First Assessed: 10/19/21 1133   Present on Hospital Admission: Yes  Location: Foot  Wound Location Orientation: Left;Medial  Wound Description: #7   Wound Image    Wound Length (cm) 0.3 cm   Wound Width (cm) 0.4 cm   Wound Depth (cm) 0.1 cm   Wound Surface Area (cm^2) 0.12 cm^2   Wound Volume (cm^3) 0.012 cm^3   Wound Assessment Slough   Drainage Amount Moderate   Drainage Description Serosanguinous   Wound Odor None   Maribel-Wound/Incision Assessment Intact   Wound Foot Right;Plantar;Lateral;Mid #2   Date First Assessed/Time First Assessed: 09/28/21 1126   Present on Hospital Admission: Yes  Primary Wound Type: Blister/bullae  Location: Foot  Wound Location Orientation: Right;Plantar;Lateral;Mid  Wound Description: #2   Wound Image    Wound Length (cm) 0.6 cm   Wound Width (cm) 0.4 cm   Wound Depth (cm) 0.2 cm   Wound Surface Area (cm^2) 0.24 cm^2   Change in Wound Size % 96.77   Wound Volume (cm^3) 0.048 cm^3   Wound Assessment Other (Comment); Slough  (white )   Drainage Amount Moderate   Drainage Description Serosanguinous   Wound Odor None   Maribel-Wound/Incision Assessment Maceration   Wound Heel Right #9   Date First Assessed/Time First Assessed: 09/14/21 1127   Present on Hospital Admission: Yes  Location: Heel  Wound Location Orientation: Right  Wound Description: #9   Wound Image    Wound Length (cm) 7 cm   Wound Width (cm) 7 cm   Wound Depth (cm) 0 cm   Wound Surface Area (cm^2) 49 cm^2   Change in Wound Size % -1261.11   Wound Volume (cm^3) 0 cm^3   Wound Healing % 100   Wound Assessment Purple/maroon   Drainage Amount None   Wound Odor None   Wound Heel Left #8   Date First Assessed/Time First Assessed: 09/14/21 1126   Present on Hospital Admission: Yes  Location: Heel  Wound Location Orientation: Left  Wound Description: #8   Wound Image    Wound Length (cm) 1 cm   Wound Width (cm) 1.2 cm   Wound Depth (cm) 0.1 cm   Wound Surface Area (cm^2) 1.2 cm^2   Change in Wound Size % 84.21   Wound Volume (cm^3) 0.12 cm^3   Wound Healing % 84   Wound Assessment Other (Comment)  (dried exudate)   Drainage Amount Scant   Drainage Description Serosanguinous   Wound Odor None   Wound Foot Right;Plantar #1   Date First Assessed/Time First Assessed: 01/12/21 0824   Present on Hospital Admission: Yes  Location: Foot  Wound Location Orientation: Right;Plantar  Wound Description: #1   Wound Image    Wound Length (cm) 0.1 cm   Wound Width (cm) 0.1 cm   Wound Depth (cm) 0.1 cm   Wound Surface Area (cm^2) 0.01 cm^2   Change in Wound Size % 99.41   Wound Volume (cm^3) 0.001 cm^3   Wound Healing % 100   Wound Foot Left;Lateral;Proximal #5   Date First Assessed/Time First Assessed: 08/03/21 1057   Present on Hospital Admission: Yes  Location: Foot  Wound Location Orientation: Left;Lateral;Proximal  Wound Description: #5   Wound Image    Wound Length (cm) 1.5 cm   Wound Width (cm) 2.4 cm   Wound Depth (cm) 0.5 cm   Wound Surface Area (cm^2) 3.6 cm^2   Change in Wound Size % -73.08   Wound Volume (cm^3) 1.8 cm^3   Wound Healing % -333   Wound Assessment Slough;Pink/red   Drainage Amount Moderate   Drainage Description Serosanguinous   Wound Odor None   Maribel-Wound/Incision Assessment Maceration; Hyperpigmented     Visit Vitals  BP (!) 152/103 (BP 1 Location: Right upper arm, BP Patient Position: Sitting)   Pulse 71   Temp 98 °F (36.7 °C)   Resp 18

## 2021-10-19 NOTE — WOUND CARE
10/19/21 1203   Wound Foot Left;Medial #7   Date First Assessed/Time First Assessed: 10/19/21 1133   Present on Hospital Admission: Yes  Location: Foot  Wound Location Orientation: Left;Medial  Wound Description: #7   Dressing/Treatment Betadine swabs/Povidone Iodine;ABD pad;Gauze dressing/dressing sponge;Roll gauze;Tape/Soft cloth adhesive tape; Foam   Wound Foot Right;Plantar;Lateral;Mid #2   Date First Assessed/Time First Assessed: 09/28/21 1126   Present on Hospital Admission: Yes  Primary Wound Type: Blister/bullae  Location: Foot  Wound Location Orientation: Right;Plantar;Lateral;Mid  Wound Description: #2   Dressing/Treatment Betadine swabs/Povidone Iodine;Gauze dressing/dressing sponge;Roll gauze;Tape/Soft cloth adhesive tape   Wound Heel Right #9   Date First Assessed/Time First Assessed: 09/14/21 1127   Present on Hospital Admission: Yes  Location: Heel  Wound Location Orientation: Right  Wound Description: #9   Dressing/Treatment Betadine swabs/Povidone Iodine;Gauze dressing/dressing sponge;Roll gauze;Tape/Soft cloth adhesive tape   Wound Heel Left #8   Date First Assessed/Time First Assessed: 09/14/21 1126   Present on Hospital Admission: Yes  Location: Heel  Wound Location Orientation: Left  Wound Description: #8   Dressing/Treatment Betadine swabs/Povidone Iodine;Gauze dressing/dressing sponge;Roll gauze;Tape/Soft cloth adhesive tape   Wound Foot Right;Plantar #1   Date First Assessed/Time First Assessed: 01/12/21 0824   Present on Hospital Admission: Yes  Location: Foot  Wound Location Orientation: Right;Plantar  Wound Description: #1   Dressing/Treatment Betadine swabs/Povidone Iodine;Foam;Gauze dressing/dressing sponge;Roll gauze;Tape/Soft cloth adhesive tape   Wound Foot Left;Lateral;Proximal #5   Date First Assessed/Time First Assessed: 08/03/21 1057   Present on Hospital Admission: Yes  Location: Foot  Wound Location Orientation: Left;Lateral;Proximal  Wound Description: #5   Dressing/Treatment Honey gel/honey paste;ABD pad;Gauze dressing/dressing sponge;Roll gauze;Foam;Tape/Soft cloth adhesive tape   Discharge Condition: Stable     Pain: 0    Ambulatory Status: Wheelchair     Discharge Destination: Home     Transportation: Car    Accompanied by: Self     Discharge instructions reviewed with Patient and copy or written instructions have been provided. All questions/concerns have been addressed at this time.

## 2021-11-02 ENCOUNTER — HOSPITAL ENCOUNTER (OUTPATIENT)
Dept: WOUND CARE | Age: 41
Discharge: HOME | End: 2021-11-02
Payer: MEDICARE

## 2021-11-02 VITALS
DIASTOLIC BLOOD PRESSURE: 114 MMHG | TEMPERATURE: 98.3 F | HEART RATE: 82 BPM | RESPIRATION RATE: 18 BRPM | SYSTOLIC BLOOD PRESSURE: 172 MMHG

## 2021-11-02 PROCEDURE — 11042 DBRDMT SUBQ TIS 1ST 20SQCM/<: CPT

## 2021-11-02 NOTE — WOUND CARE
11/02/21 1128   Wound Foot Left;Medial #7   Date First Assessed/Time First Assessed: 10/19/21 1133   Present on Hospital Admission: Yes  Location: Foot  Wound Location Orientation: Left;Medial  Wound Description: #7   Dressing/Treatment Honey gel/honey paste;Gauze dressing/dressing sponge;Roll gauze;Tape/Soft cloth adhesive tape   Wound Foot Right;Plantar;Lateral;Mid #2   Date First Assessed/Time First Assessed: 09/28/21 1126   Present on Hospital Admission: Yes  Primary Wound Type: Blister/bullae  Location: Foot  Wound Location Orientation: Right;Plantar;Lateral;Mid  Wound Description: #2   Dressing/Treatment Betadine swabs/Povidone Iodine;Gauze dressing/dressing sponge;Roll gauze;Tape/Soft cloth adhesive tape   Wound Heel Right #9   Date First Assessed/Time First Assessed: 09/14/21 1127   Present on Hospital Admission: Yes  Location: Heel  Wound Location Orientation: Right  Wound Description: #9   Dressing/Treatment Betadine swabs/Povidone Iodine;Gauze dressing/dressing sponge;Roll gauze;Tape change   Wound Foot Left;Lateral;Proximal #5   Date First Assessed/Time First Assessed: 08/03/21 1057   Present on Hospital Admission: Yes  Location: Foot  Wound Location Orientation: Left;Lateral;Proximal  Wound Description: #5   Dressing/Treatment Honey gel/honey paste; Roll gauze;Gauze dressing/dressing sponge;Tape/Soft cloth adhesive tape   Discharge Condition: Stable     Pain: 0    Ambulatory Status: Wheelchair     Discharge Destination: Home     Transportation: Car    Accompanied by: Self     Discharge instructions reviewed with Patient and copy or written instructions have been provided. All questions/concerns have been addressed at this time.

## 2021-11-02 NOTE — DISCHARGE INSTRUCTIONS
Discharge Instructions for  [facility]  [address] [address], [address] [facility]  Telephone: [phone]    NAME:  Hailee Mcdonald  YOB: 1980  DATE:  10/5/2021    [x] Home Healthcare: TO: [facility]    Wound Cleansing:   Do not scrub or use excessive force. Cleanse wound prior to applying a clean dressing with:    [] Normal Saline   [] Keep Wound Dry in Shower      [] Wound Cleanser (***)  [] May Shower at Discharge   [] cleanse with Marletta Sidra and Marletta Sidra baby shampoo lather leave 2-3 then rinse with water, pat dry and redress wound. 1- Dressings:           Wound Location left lateral and medial foot    Apply Primary Dressing:  medihoney gauze roll gauze tape    2- Dressings:                  Wound Location Left and Right bilateral heels, right plantar foot    Apply Primary Dressing:  betadine wet to dry gauze roll gauze tape netting                                               Foam cutout to left medial and lateral foot wounds,    Change dressing:   [] Daily      [x] Every Other Day   [] Three times per week  [] Once a week   [] Do Not Change Dressing     [] Other:    Off-Loading:   [x] Off-loading when [] walking  [x] in bed [] sitting  Need to continue to wear bunny boots for off loading    Dietary:  [x] Diet as tolerated: [] Diabetic Diet:   [x] Increase Protein: examples ( Meat, cheese, eggs, greek yogurt, premier protein drink, fish, nuts )   [] Other:    Return Appointment:  [] Wound assessment with Nurse at wound center in *** days     [x] Return Appointment: With Dr. Mona Griffin 1 week         Will send referral to:  For formal OLI's bilaterally,  more multiple wound areas, DTI's to right and left heels    Dr. Christy Espinoza  Vascular and Interventional Radiology  [facility]  [facility] [address], [address] [facility]  [phone]  Fax [phone]     Electronically signed on 10/5/2021     Wound Care Center Information: Should you experience any significant changes in your wound(s) or have questions about your wound care, please contact the [facility] Main at [address] [place] [address] 8:00 am - 4:30. If you need help with your wound outside these hours and cannot wait until we are again available, contact your PCP or go to the hospital emergency room. PLEASE NOTE: IF YOU ARE UNABLE TO OBTAIN WOUND SUPPLIES, CONTINUE TO USE THE SUPPLIES YOU HAVE AVAILABLE UNTIL YOU ARE ABLE TO REACH US. IT IS MOST IMPORTANT TO KEEP THE WOUND COVERED AT ALL TIMES.      Physician Signature:_______________________  Dr. Elliot Palmer

## 2021-11-02 NOTE — WOUND CARE
Wound Center  Progress Note    Subjective:   Patient is for follow up of LE ulcer and now has new ulcers(s). Patient is doing well. No complaints of wound pain. He has had car issues and has not been able to make it to the wound center. He recently found a transportation shuttle to bring him as his car conts to be worked on. There have been no changes in patient's medical history in the interim. ROS:  No fever or chills. No rash. No pain at site of wound    Objective:   General: NAD  Psych: Cooperative, no anxiety or depression  Neuro: Alert, oriented to person/place/situation. Otherwise nonfocal.  Extremities: Bilateral mild pitting edema is noted. Skin color is normal.   Vascular exam:  No gross changes in pedal pulses. Capillary refill is intact, <3sec. Dermatologic:  Skin color appears normal for patient. Skin turgor is normal. Dystrophic nails are seen on the feet bilaterally. Ulcer Description:   Measurement: in cm pre/post debridement:  2.3 x 2.0 x 0.3 / same inc depth to 0.4 at base of L 5th metatarsal.  Still has wound plantar to R submet 5 but smaller in size & it calluses over. + wounds smaller vs deep tissue injuries medial L foot, B heel and distal L 5th met, see nursing measurements. 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 Otherwise Cont BDW2D to wounds/blistersFrequency: every other day. Cont to strictly wear bunny boots and to not wear closed shoes. Re-inforced that the main concern is pressure combined with neuropathy LOPS and that we need to manage these with the padding/boots to try and treat and help prevent future ulcrations. [place] vasc still pending.   States he did get an air purifier (ionizer) that he feels is helpful and that his apt had very bad air and has not had clean air filters. Will see in 2 wks. Plan is reviewed with patient who expresses understanding. Questions were answered. Kwadwo Bolaños DPM        Ulcer assessment: Due to presence of necrotic tissue within the wound bed, ulcer requires debridement. Procedure: Debridement:   The indication for debridement was reviewed with patient. Risks of procedure (bleeding, infection, pain) were discussed with patient and consent signed. Questions were answered    Subcutaneous excisional debridement   Indication: to remove necrotic tissue/ devitalized tissue/ soft eschar/ infected tissue/obtain deep wound culture through subcutaneous layer of wound bed  Consent in chart   Anesthesia: Topical 2% lidocaine jelly  Instrument: 15 Blade,    Residual Necrosis: Present and scored   Bleeding: <1ml   Hemostasis: Pressure   Patient tolerated procedure well   Procedural Pain: none  Post - procedural pain: none    Post debridement measurements: see progress note.   Surface area debrided: <20 sq. cm

## 2021-11-02 NOTE — WOUND CARE
11/02/21 1055   Right Leg Edema Point of Measurement   Leg circumference 29 cm   Ankle circumference 21.5 cm   Left Leg Edema Point of Measurement   Leg circumference 32 cm   Ankle circumference 22 cm   LLE Peripheral Vascular    Capillary Refill Less than/equal to 3 seconds   Color Appropriate for race   Temperature Warm   Pedal Pulse Palpable   RLE Peripheral Vascular    Capillary Refill Less than/equal to 3 seconds   Color Appropriate for race   Temperature Warm   Pedal Pulse Palpable   Wound Foot Left;Medial #7   Date First Assessed/Time First Assessed: 10/19/21 1133   Present on Hospital Admission: Yes  Location: Foot  Wound Location Orientation: Left;Medial  Wound Description: #7   Wound Image    Wound Length (cm) 0.3 cm   Wound Width (cm) 0.3 cm   Wound Depth (cm) 0.1 cm   Wound Surface Area (cm^2) 0.09 cm^2   Change in Wound Size % 25   Wound Volume (cm^3) 0.009 cm^3   Wound Healing % 25   Wound Assessment Other (Comment)  (dried exudate)   Drainage Amount Small   Drainage Description Serous   Wound Odor None   Maribel-Wound/Incision Assessment Intact   Wound Foot Right;Plantar;Lateral;Mid #2   Date First Assessed/Time First Assessed: 09/28/21 1126   Present on Hospital Admission: Yes  Primary Wound Type: Blister/bullae  Location: Foot  Wound Location Orientation: Right;Plantar;Lateral;Mid  Wound Description: #2   Wound Image    Wound Length (cm) 1 cm   Wound Width (cm) 1.4 cm   Wound Depth (cm) 0.1 cm   Wound Surface Area (cm^2) 1.4 cm^2   Change in Wound Size % 81.18   Wound Volume (cm^3) 0.14 cm^3   Wound Assessment Pink/red;Slough  (dried exudate)   Drainage Amount Moderate   Drainage Description Serous   Wound Odor None   Maribel-Wound/Incision Assessment Intact   Wound Foot Right;Plantar #1   Date First Assessed/Time First Assessed: 01/12/21 0824   Present on Hospital Admission: Yes  Location: Foot  Wound Location Orientation: Right;Plantar  Wound Description: #1   Wound Image    Wound Length (cm) 0 cm Wound Width (cm) 0 cm   Wound Depth (cm) 0 cm   Wound Surface Area (cm^2) 0 cm^2   Change in Wound Size % 100   Wound Volume (cm^3) 0 cm^3   Wound Healing % 100   Post-Procedure Length (cm) 0 cm   Post-Procedure Width (cm) 0 cm   Post-Procedure Surface Area (cm^2) 0 cm^2   Wound Heel Right #9   Date First Assessed/Time First Assessed: 09/14/21 1127   Present on Hospital Admission: Yes  Location: Heel  Wound Location Orientation: Right  Wound Description: #9   Wound Image    Wound Length (cm) 5.5 cm   Wound Width (cm) 4.1 cm   Wound Depth (cm) 0.1 cm   Wound Surface Area (cm^2) 22.55 cm^2   Change in Wound Size % -526.39   Wound Volume (cm^3) 2.255 cm^3   Wound Healing % -526   Wound Assessment Pink/red;Slough  (dried exudate)   Drainage Amount Moderate   Drainage Description Serosanguinous   Wound Odor None   Maribel-Wound/Incision Assessment Intact   Wound Foot Left;Lateral;Proximal #5   Date First Assessed/Time First Assessed: 08/03/21 1057   Present on Hospital Admission: Yes  Location: Foot  Wound Location Orientation: Left;Lateral;Proximal  Wound Description: #5   Wound Image    Wound Length (cm) 2 cm   Wound Width (cm) 2.3 cm   Wound Depth (cm) 0.3 cm   Wound Surface Area (cm^2) 4.6 cm^2   Change in Wound Size % -121.15   Wound Volume (cm^3) 1.38 cm^3   Wound Healing % -232   Wound Assessment Slough;Pink/red;Eschar moist   Drainage Amount Moderate   Drainage Description Serosanguinous   Wound Odor None   Maribel-Wound/Incision Assessment Maceration   Wound Heel Left #8   Date First Assessed/Time First Assessed: 09/14/21 1126   Present on Hospital Admission: Yes  Location: Heel  Wound Location Orientation: Left  Wound Description: #8   Wound Image    Wound Length (cm) 0 cm   Wound Width (cm) 0 cm   Wound Depth (cm) 0 cm   Wound Surface Area (cm^2) 0 cm^2   Change in Wound Size % 100   Wound Volume (cm^3) 0 cm^3   Wound Healing % 100     Visit Vitals  BP (!) 172/114   Pulse 82   Temp 98.3 °F (36.8 °C)   Resp 18

## 2021-11-16 ENCOUNTER — HOSPITAL ENCOUNTER (OUTPATIENT)
Dept: WOUND CARE | Age: 41
Discharge: HOME | End: 2021-11-16
Payer: MEDICARE

## 2021-11-16 VITALS
SYSTOLIC BLOOD PRESSURE: 154 MMHG | RESPIRATION RATE: 16 BRPM | DIASTOLIC BLOOD PRESSURE: 107 MMHG | TEMPERATURE: 98.2 F | HEART RATE: 64 BPM

## 2021-11-16 PROCEDURE — 11042 DBRDMT SUBQ TIS 1ST 20SQCM/<: CPT

## 2021-11-16 NOTE — WOUND CARE
Wound Center  Progress Note    Subjective:   Patient is for follow up of LE ulcer and now has new ulcers(s). Patient is doing well. No complaints of wound pain. He has had car issues and has not been able to make it to the wound center. He recently found a transportation shuttle to bring him as his car conts to be worked on. There have been no changes in patient's medical history in the interim. ROS:  No fever or chills. No rash. No pain at site of wound    Objective:   General: NAD  Psych: Cooperative, no anxiety or depression  Neuro: Alert, oriented to person/place/situation. Otherwise nonfocal.  Extremities: Bilateral mild pitting edema is noted. Skin color is normal.   Vascular exam:  No gross changes in pedal pulses. Capillary refill is intact, <3sec. Dermatologic:  Skin color appears normal for patient. Skin turgor is normal. Dystrophic nails are seen on the feet bilaterally. Ulcer Description:   Measurement: in cm pre/post debridement:  2.3 x 2.0 x 0.3 / same inc depth to 0.4 at base of L 5th metatarsal.  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 Cont an air purifier (ionizer) that he feels is helpful and that his apt had very bad air and has not had clean air filters. Will see in 2 wks. Plan is reviewed with patient who expresses understanding. Questions were answered. Kwadwo Gallardo DPM        Ulcer assessment: Due to presence of necrotic tissue within the wound bed, ulcer requires debridement. Procedure: Debridement:   The indication for debridement was reviewed with patient.  Risks of procedure (bleeding, infection, pain) were discussed with patient and consent signed. Questions were answered    Subcutaneous excisional debridement   Indication: to remove necrotic tissue/ devitalized tissue/ soft eschar/ infected tissue/obtain deep wound culture through subcutaneous layer of wound bed  Consent in chart   Anesthesia: Topical 2% lidocaine jelly  Instrument: 15 Blade,    Residual Necrosis: Present and scored   Bleeding: <1ml   Hemostasis: Pressure   Patient tolerated procedure well   Procedural Pain: none  Post - procedural pain: none    Post debridement measurements: see progress note.   Surface area debrided: <20 sq. cm

## 2021-11-22 NOTE — DISCHARGE INSTRUCTIONS
Discharge Instructions for  [facility]  [address] [place], [address] [facility]  Telephone: [phone]    NAME:  Lauri Dee  YOB: 1980  DATE:  11/2/2021    [x] Home Healthcare: TO: [facility]    Wound Cleansing:   Do not scrub or use excessive force. Cleanse wound prior to applying a clean dressing with:    [] Normal Saline   [] Keep Wound Dry in Shower      [] Wound Cleanser (***)  [] May Shower at Discharge   [] cleanse with Janeice Crock and Janeice Crock baby shampoo lather leave 2-3 then rinse with water, pat dry and redress wound. 1- Dressings:           Wound Location left lateral and medial foot    Apply Primary Dressing:  medihoney gauze roll gauze tape    2- Dressings:                  Wound Location  Right heel, right plantar foot    Apply Primary Dressing:  betadine wet to dry gauze roll gauze tape netting                                               Foam cutout to left medial and lateral foot wounds,    Change dressing:   [] Daily      [x] Every Other Day   [] Three times per week  [] Once a week   [] Do Not Change Dressing     [] Other:    Off-Loading:   [x] Off-loading when [] walking  [x] in bed [] sitting  Need to continue to wear bunny boots for off loading    Dietary:  [x] Diet as tolerated: [] Diabetic Diet:   [x] Increase Protein: examples ( Meat, cheese, eggs, greek yogurt, premier protein drink, fish, nuts )   [] Other:    Return Appointment:  [] Wound assessment with Nurse at wound center in *** days     [x] Return Appointment: With Dr. Barnie Moritz 2  week         Will send referral to:  For formal OLI's bilaterally,  more multiple wound areas, DTI's to right and left heels    Dr. Tong Plasencia  Vascular and Interventional Radiology  [facility]  [facility] [address], [address] [facility]  [phone]  Fax [phone]     Electronically signed on 11/2/2021     [facility] Information: Should you experience any significant changes in your wound(s) or have questions about your wound care, please contact the [facility] Main at [address] [place] [address] 8:00 am - 4:30. If you need help with your wound outside these hours and cannot wait until we are again available, contact your PCP or go to the hospital emergency room. PLEASE NOTE: IF YOU ARE UNABLE TO OBTAIN WOUND SUPPLIES, CONTINUE TO USE THE SUPPLIES YOU HAVE AVAILABLE UNTIL YOU ARE ABLE TO REACH US. IT IS MOST IMPORTANT TO KEEP THE WOUND COVERED AT ALL TIMES.      Physician Signature:_______________________  Dr. Lisy Holloway

## 2021-11-30 ENCOUNTER — HOSPITAL ENCOUNTER (OUTPATIENT)
Dept: WOUND CARE | Age: 41
Discharge: HOME | End: 2021-11-30
Payer: MEDICARE

## 2021-11-30 VITALS
HEART RATE: 71 BPM | RESPIRATION RATE: 16 BRPM | SYSTOLIC BLOOD PRESSURE: 133 MMHG | DIASTOLIC BLOOD PRESSURE: 93 MMHG | TEMPERATURE: 98.7 F

## 2021-11-30 PROCEDURE — 74011000250 HC RX REV CODE- 250: Performed by: PODIATRIST

## 2021-11-30 PROCEDURE — 11042 DBRDMT SUBQ TIS 1ST 20SQCM/<: CPT

## 2021-11-30 RX ADMIN — Medication: at 11:35

## 2021-11-30 NOTE — WOUND CARE
11/30/21 1149   Wound Foot Right;Plantar;Lateral;Mid #2   Date First Assessed/Time First Assessed: 09/28/21 1126   Present on Hospital Admission: Yes  Primary Wound Type: Blister/bullae  Location: Foot  Wound Location Orientation: Right;Plantar;Lateral;Mid  Wound Description: #2   Dressing Status New dressing applied   Dressing/Treatment Betadine swabs/Povidone Iodine; Gauze dressing/dressing sponge; Roll gauze; Tape/Soft cloth adhesive tape   Wound Foot Left;Lateral;Proximal #5   Date First Assessed/Time First Assessed: 08/03/21 1057   Present on Hospital Admission: Yes  Location: Foot  Wound Location Orientation: Left;Lateral;Proximal  Wound Description: #5   Dressing Status New dressing applied   Dressing/Treatment Betadine swabs/Povidone Iodine; Gauze dressing/dressing sponge; Roll gauze; Tape/Soft cloth adhesive tape   Discharge Condition: Stable     Pain: 0    Ambulatory Status: Wheelchair     Discharge Destination: Work     Transportation: Car    Accompanied by: Self     Discharge instructions reviewed with Patient and copy or written instructions have been provided. All questions/concerns have been addressed at this time.

## 2021-11-30 NOTE — DISCHARGE INSTRUCTIONS
Discharge Instructions for  [facility]  [place] [address] [place], [address] [facility]  Telephone: [phone]    NAME:  Phylicia Fuentes  YOB: 1980  DATE:  11/16/2021    [x] Home Healthcare: TO: [facility]    Wound Cleansing:   Do not scrub or use excessive force. Cleanse wound prior to applying a clean dressing with:    [] Normal Saline   [] Keep Wound Dry in Shower      [] Wound Cleanser (***)  [] May Shower at Discharge   [] cleanse with Radha Fam and Radha Fam baby shampoo lather leave 2-3 then rinse with water, pat dry and redress wound. 1- Dressings:           Wound Location left lateral foot wounds    Apply Primary Dressing:  Betadine soaked gauze, dry gauze, roll gauze tape, for 1 week to dry out wound    Foam to bilateral heels for protection                                           Change dressing:   [] Daily      [x] Every Other Day   [] Three times per week  [] Once a week   [] Do Not Change Dressing     [] Other:    Off-Loading:   [x] Off-loading when [] walking  [x] in bed [] sitting  Need to continue to wear bunny boots for off loading    Dietary:  [x] Diet as tolerated: [] Diabetic Diet:   [x] Increase Protein: examples ( Meat, cheese, eggs, greek yogurt, premier protein drink, fish, nuts )   [] Other:    Return Appointment:  [] Wound assessment with Nurse at wound center in *** days     [x] Return Appointment: With Dr. Silvia Allred 2  week         Will send referral to:  For formal OLI's bilaterally,  more multiple wound areas, DTI's to right and left heels    Dr. Gato Rojas  Vascular and Interventional Radiology  [facility]  [address] [place] [address] [address], [address] [facility]  [phone]  [facility] [phone]     Electronically signed on 11/16/2021     [address] [place] [address] Information: Should you experience any significant changes in your wound(s) or have questions about your wound care, please contact the Darline Burris [address] [facility] [facility] at [address] 8:00 am - 4:30. If you need help with your wound outside these hours and cannot wait until we are again available, contact your PCP or go to the hospital emergency room. PLEASE NOTE: IF YOU ARE UNABLE TO OBTAIN WOUND SUPPLIES, CONTINUE TO USE THE SUPPLIES YOU HAVE AVAILABLE UNTIL YOU ARE ABLE TO REACH US. IT IS MOST IMPORTANT TO KEEP THE WOUND COVERED AT ALL TIMES.      Physician Signature:_______________________  Dr. Dulce [place]

## 2021-11-30 NOTE — WOUND CARE
11/30/21 1124   Right Leg Edema Point of Measurement   Leg circumference 27 cm   Ankle circumference 21 cm   Left Leg Edema Point of Measurement   Leg circumference 29.5 cm   Ankle circumference 22 cm   LLE Peripheral Vascular    Capillary Refill Less than/equal to 3 seconds   Color Appropriate for race   Temperature Warm   Pedal Pulse Palpable   RLE Peripheral Vascular    Capillary Refill Less than/equal to 3 seconds   Color Appropriate for race   Temperature Warm   Pedal Pulse Palpable   [REMOVED] Wound Heel Right #9   Final Assessment Date/Final Assessment Time: 11/30/21 1131  Date First Assessed/Time First Assessed: 09/14/21 1127   Present on Hospital Admission: Yes  Location: Heel  Wound Location Orientation: Right  Wound Description: #9  Wound Outcome: Healed   Wound Image    Wound Foot Left;Medial #7   Date First Assessed/Time First Assessed: 10/19/21 1133   Present on Hospital Admission: Yes  Location: Foot  Wound Location Orientation: Left;Medial  Wound Description: #7   Wound Image    Dressing Status Clean; Dry; Intact   Cleansed Cleansed with saline   Wound Length (cm) 0.1 cm   Wound Width (cm) 0.1 cm   Wound Depth (cm) 0.1 cm   Wound Surface Area (cm^2) 0.01 cm^2   Change in Wound Size % 91.67   Wound Volume (cm^3) 0.001 cm^3   Wound Healing % 92   Wound Assessment Epithelialization; Pink/red   Wound Foot Right;Plantar;Lateral;Mid #2   Date First Assessed/Time First Assessed: 09/28/21 1126   Present on Hospital Admission: Yes  Primary Wound Type: Blister/bullae  Location: Foot  Wound Location Orientation: Right;Plantar;Lateral;Mid  Wound Description: #2   Wound Image    Dressing Status Old drainage noted   Cleansed Cleansed with saline   Wound Length (cm) 0.7 cm   Wound Width (cm) 0.7 cm   Wound Depth (cm) 0.1 cm   Wound Surface Area (cm^2) 0.49 cm^2   Change in Wound Size % 93.41   Wound Volume (cm^3) 0.049 cm^3   Wound Assessment Pink/red   Drainage Amount Moderate   Drainage Description Serosanguinous   Wound Odor None   Maribel-Wound/Incision Assessment Excoriated   Wound Foot Left;Lateral;Proximal #5   Date First Assessed/Time First Assessed: 08/03/21 1057   Present on Hospital Admission: Yes  Location: Foot  Wound Location Orientation: Left;Lateral;Proximal  Wound Description: #5   Wound Image    Dressing Status Old drainage noted   Cleansed Cleansed with saline   Wound Length (cm) 1.5 cm   Wound Width (cm) 2.1 cm   Wound Depth (cm) 0.3 cm   Wound Surface Area (cm^2) 3.15 cm^2   Change in Wound Size % -51.44   Wound Volume (cm^3) 0.945 cm^3   Wound Healing % -127   Wound Assessment Slough;  Other (Comment); Pink/red  (white)   Drainage Amount Moderate   Drainage Description Serosanguinous   Wound Odor None   Maribel-Wound/Incision Assessment Maceration   Pain 1   Pain Scale 1 Numeric (0 - 10)   Pain Intensity 1 4   Pain Onset 1 chronic   Pain Location 1 Hand   Pain Orientation 1 Right     Visit Vitals  BP (!) 133/93 (BP 1 Location: Left upper arm, BP Patient Position: Sitting)   Pulse 71   Temp 98.7 °F (37.1 °C)   Resp 16

## 2021-11-30 NOTE — WOUND CARE
Wound Center  Progress Note    Subjective:   Patient is for follow up of LE ulcer and now has new ulcers(s). He wore dress shoes and now R foot flared. Patient is doing well. No complaints of wound pain. He has had car issues and has not been able to make it to the wound center. He recently found a transportation shuttle to bring him as his car conts to be worked on. There have been no changes in patient's medical history in the interim. ROS:  No fever or chills. No rash. No pain at site of wound    Objective:   General: NAD  Psych: Cooperative, no anxiety or depression  Neuro: Alert, oriented to person/place/situation. Otherwise nonfocal.  Extremities: Bilateral mild pitting edema is noted. Skin color is normal.   Vascular exam:  No gross changes in pedal pulses. Capillary refill is intact, <3sec. Dermatologic:  Skin color appears normal for patient. Skin turgor is normal. Dystrophic nails are seen on the feet bilaterally. 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 Cont an air purifier (ionizer) that he feels is helpful and that his apt had very bad air and has not had clean air filters. Strictly to avoid wearing shoes. Will see in 2 wks. Plan is reviewed with patient who expresses understanding. Questions were answered. Kwadwo Case DPM        Ulcer assessment: Due to presence of necrotic tissue within the wound bed, ulcer requires debridement. Procedure: Debridement:    The indication for debridement was reviewed with patient. Risks of procedure (bleeding, infection, pain) were discussed with patient and consent signed. Questions were answered    Subcutaneous excisional debridement   Indication: to remove necrotic tissue/ devitalized tissue/ soft eschar/ infected tissue/obtain deep wound culture through subcutaneous layer of wound bed  Consent in chart   Anesthesia: Topical 2% lidocaine jelly  Instrument: 15 Blade,    Residual Necrosis: Present and scored   Bleeding: <1ml   Hemostasis: Pressure   Patient tolerated procedure well   Procedural Pain: none  Post - procedural pain: none    Post debridement measurements: see progress note.   Surface area debrided: <20 sq. cm

## 2021-12-14 ENCOUNTER — HOSPITAL ENCOUNTER (OUTPATIENT)
Dept: WOUND CARE | Age: 41
Discharge: HOME | End: 2021-12-14
Payer: MEDICARE

## 2021-12-14 VITALS
SYSTOLIC BLOOD PRESSURE: 144 MMHG | RESPIRATION RATE: 18 BRPM | HEART RATE: 69 BPM | TEMPERATURE: 97 F | DIASTOLIC BLOOD PRESSURE: 93 MMHG

## 2021-12-14 PROCEDURE — 11042 DBRDMT SUBQ TIS 1ST 20SQCM/<: CPT

## 2021-12-14 NOTE — DISCHARGE INSTRUCTIONS
Discharge Instructions for  [facility]  [address] [place], [address] [facility]  Telephone: [phone]    NAME:  Katerin Hernández  YOB: 1980  DATE:  11/30/2021    [x] Home Healthcare: TO: [facility]    Wound Cleansing:   Do not scrub or use excessive force. Cleanse wound prior to applying a clean dressing with:    [] Normal Saline   [] Keep Wound Dry in Shower      [] Wound Cleanser (***)  [] May Shower at Discharge   [] cleanse with Germain Dais and Germain Dais baby shampoo lather leave 2-3 then rinse with water, pat dry and redress wound. 1- Dressings:           Wound Location left and right lateral foot wounds    Apply Primary Dressing:  Betadine soaked gauze, dry gauze, roll gauze, tape. Foam to bilateral heels for protection                                           Change dressing:   [] Daily      [x] Every Other Day   [] Three times per week  [] Once a week   [] Do Not Change Dressing     [] Other:    Off-Loading:   [x] Off-loading when [] walking  [x] in bed [] sitting  Need to continue to wear bunny boots for off loading    Dietary:  [x] Diet as tolerated: [] Diabetic Diet:   [x] Increase Protein: examples ( Meat, cheese, eggs, greek yogurt, premier protein drink, fish, nuts )   [] Other:    Return Appointment:  [] Wound assessment with Nurse at wound center in *** days     [x] Return Appointment: With Dr. Dick Cuevas 2  week         Will send referral to:  For formal OLI's bilaterally,  more multiple wound areas, DTI's to right and left heels    Dr. Dany Hackett  Vascular and Interventional Radiology  [facility]  [address], [address] [facility] [place], [facility]  [phone]  [facility] [phone]     Electronically signed on 11/30/2021     [address] [facility] Information: Should you experience any significant changes in your wound(s) or have questions about your wound care, please contact the [facility] at [address] [place] [address] 8:00 am - 4:30. If you need help with your wound outside these hours and cannot wait until we are again available, contact your PCP or go to the hospital emergency room. PLEASE NOTE: IF YOU ARE UNABLE TO OBTAIN WOUND SUPPLIES, CONTINUE TO USE THE SUPPLIES YOU HAVE AVAILABLE UNTIL YOU ARE ABLE TO REACH US. IT IS MOST IMPORTANT TO KEEP THE WOUND COVERED AT ALL TIMES.      Physician Signature:_______________________  Dr. Yosi Anthony

## 2021-12-14 NOTE — WOUND CARE
12/14/21 1129   LLE Peripheral Vascular    Capillary Refill Less than/equal to 3 seconds   Color Appropriate for race   Temperature Warm   Pedal Pulse Palpable   RLE Peripheral Vascular    Capillary Refill Less than/equal to 3 seconds   Color Appropriate for race   Temperature Warm   Pedal Pulse Palpable   Wound Foot Right;Plantar;Lateral;Mid #2   Date First Assessed/Time First Assessed: 09/28/21 1126   Present on Hospital Admission: Yes  Primary Wound Type: Blister/bullae  Location: Foot  Wound Location Orientation: Right;Plantar;Lateral;Mid  Wound Description: #2   Wound Image    Wound Length (cm) 0.1 cm   Wound Width (cm) 0.1 cm   Wound Depth (cm) 0.1 cm   Wound Surface Area (cm^2) 0.01 cm^2   Change in Wound Size % 99.87   Wound Volume (cm^3) 0.001 cm^3   Wound Assessment Epithelialization   Drainage Amount None   Wound Odor None   Wound Foot Left;Lateral;Proximal #5   Date First Assessed/Time First Assessed: 08/03/21 1057   Present on Hospital Admission: Yes  Location: Foot  Wound Location Orientation: Left;Lateral;Proximal  Wound Description: #5   Wound Image    Cleansed Cleansed with saline   Wound Length (cm) 2 cm   Wound Width (cm) 2.4 cm   Wound Depth (cm) 0.3 cm   Wound Surface Area (cm^2) 4.8 cm^2   Change in Wound Size % -130.77   Wound Volume (cm^3) 1.44 cm^3   Wound Healing % -246   Wound Assessment Slough; Pink/red   Drainage Amount Moderate   Drainage Description Serosanguinous   Wound Odor None   Maribel-Wound/Incision Assessment Maceration   Edges Epibole (rolled edges)   Wound Thickness Description Full thickness     Visit Vitals  BP (!) 144/93 (BP 1 Location: Right upper arm, BP Patient Position: Sitting)   Pulse 69   Temp 97 °F (36.1 °C)   Resp 18

## 2021-12-14 NOTE — WOUND CARE
Wound Center  Progress Note    Subjective:   Patient is for follow up of LE ulcer and now has new ulcers(s). He wore dress shoes and now R foot flared. Patient is doing well. No complaints of wound pain. He has had car issues and has not been able to make it to the wound center. He recently found a transportation shuttle to bring him as his car conts to be worked on. There have been no changes in patient's medical history in the interim. ROS:  No fever or chills. No rash. No pain at site of wound    Objective:   General: NAD  Psych: Cooperative, no anxiety or depression  Neuro: Alert, oriented to person/place/situation. Otherwise nonfocal.  Extremities: Bilateral mild pitting edema is noted. Skin color is normal.   Vascular exam:  No gross changes in pedal pulses. Capillary refill is intact, <3sec. Dermatologic:  Skin color appears normal for patient. Skin turgor is normal. Dystrophic nails are seen on the feet bilaterally. Ulcer Description:   Measurement: in cm pre/post debridement:  2.4 x 2.0 x 0.3 / same inc depth to 0.4 at base of L 5th metatarsal R 5th met and other wounds healed. 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 Cont an air purifier (ionizer) that he feels is helpful and that his apt had very bad air and has not had clean air filters. Strictly to avoid wearing shoes. Will see in 2 wks. Plan is reviewed with patient who expresses understanding. Questions were answered. Kwadwo Lamar DPM        Ulcer assessment: Due to presence of necrotic tissue within the wound bed, ulcer requires debridement.     Procedure: Debridement:   The indication for debridement was reviewed with patient. Risks of procedure (bleeding, infection, pain) were discussed with patient and consent signed. Questions were answered    Subcutaneous excisional debridement   Indication: to remove necrotic tissue/ devitalized tissue/ soft eschar/ infected tissue/obtain deep wound culture through subcutaneous layer of wound bed  Consent in chart   Anesthesia: Topical 2% lidocaine jelly  Instrument: 15 Blade,    Residual Necrosis: Present and scored   Bleeding: <1ml   Hemostasis: Pressure   Patient tolerated procedure well   Procedural Pain: none  Post - procedural pain: none    Post debridement measurements: see progress note.   Surface area debrided: <20 sq. cm

## 2021-12-14 NOTE — WOUND CARE
Discharge Condition: Stable     Pain: 0    Ambulatory Status: Wheelchair     Discharge Destination: Home     Transportation: Car    Accompanied by: Self     Discharge instructions reviewed with Patient and copy or written instructions have been provided. All questions/concerns have been addressed at this time.

## 2021-12-28 ENCOUNTER — HOSPITAL ENCOUNTER (OUTPATIENT)
Dept: WOUND CARE | Age: 41
Discharge: HOME | End: 2021-12-28

## 2021-12-28 NOTE — DISCHARGE INSTRUCTIONS
Discharge Instructions for  [facility]  [place] [address] [place], [address] [facility]  Telephone: [phone]    NAME:  Basilia Ramirez  YOB: 1980  DATE:  12/28/2021    [x] Home Healthcare: TO: [facility]    Wound Cleansing:   Do not scrub or use excessive force. Cleanse wound prior to applying a clean dressing with:    [] Normal Saline   [] Keep Wound Dry in Shower      [] Wound Cleanser (***)  [] May Shower at Discharge   [] cleanse with Emmie Star and Emmie Star baby shampoo lather leave 2-3 then rinse with water, pat dry and redress wound. 1- Dressings:           Wound Location left  lateral foot wound    Apply Primary Dressing:  Betadine soaked gauze, dry gauze, roll gauze, tape. Foam to bilateral heels for protection                                           Change dressing:   [] Daily      [x] Every Other Day   [] Three times per week  [] Once a week   [] Do Not Change Dressing     [] Other:    Off-Loading:   [x] Off-loading when [] walking  [x] in bed [] sitting  Need to continue to wear bunny boots for off loading    Dietary:  [x] Diet as tolerated: [] Diabetic Diet:   [x] Increase Protein: examples ( Meat, cheese, eggs, greek yogurt, premier protein drink, fish, nuts )   [] Other:    Return Appointment:  [] Wound assessment with Nurse at wound center in *** days     [x] Return Appointment: With Dr. Jorge A Ross 2  week         Will send referral to:  For formal OLI's bilaterally,  more multiple wound areas, DTI's to right and left heels    Dr. Pietro Chauhan  Vascular and Interventional Radiology  [facility]  Critical access hospital [address], [address] [facility]  [phone]  [facility] [phone]     Electronically signed on 12/28/2021     [address] [facility] Information: Should you experience any significant changes in your wound(s) or have questions about your wound care, please contact the [address] [place] [address] [address] [place] at [address] [place] [address] 8:00 am - 4:30. If you need help with your wound outside these hours and cannot wait until we are again available, contact your PCP or go to the hospital emergency room. PLEASE NOTE: IF YOU ARE UNABLE TO OBTAIN WOUND SUPPLIES, CONTINUE TO USE THE SUPPLIES YOU HAVE AVAILABLE UNTIL YOU ARE ABLE TO REACH US. IT IS MOST IMPORTANT TO KEEP THE WOUND COVERED AT ALL TIMES.      Physician Signature:_______________________  Dr. Cate Rangel

## 2022-01-04 ENCOUNTER — HOSPITAL ENCOUNTER (OUTPATIENT)
Dept: WOUND CARE | Age: 42
Discharge: HOME | End: 2022-01-04
Payer: MEDICARE

## 2022-01-04 VITALS
SYSTOLIC BLOOD PRESSURE: 144 MMHG | TEMPERATURE: 97.3 F | DIASTOLIC BLOOD PRESSURE: 92 MMHG | HEART RATE: 63 BPM | RESPIRATION RATE: 16 BRPM

## 2022-01-04 PROCEDURE — 11042 DBRDMT SUBQ TIS 1ST 20SQCM/<: CPT

## 2022-01-04 NOTE — WOUND CARE
01/04/22 1204   Wound Foot Left;Lateral;Proximal #5   Date First Assessed/Time First Assessed: 08/03/21 1057   Present on Hospital Admission: Yes  Location: Foot  Wound Location Orientation: Left;Lateral;Proximal  Wound Description: #5   Dressing/Treatment Betadine swabs/Povidone Iodine;Collagen;Silicone border  (betadine to periwound. chloe to wound base)   Discharge Condition: Stable     Pain: 0    Ambulatory Status: Wheelchair     Discharge Destination: Home     Transportation: Car    Accompanied by: Self     Discharge instructions reviewed with Patient and copy or written instructions have been provided. All questions/concerns have been addressed at this time.

## 2022-01-04 NOTE — WOUND CARE
01/04/22 1117   Left Leg Edema Point of Measurement   Leg circumference 27.5 cm   Ankle circumference 20 cm   LLE Peripheral Vascular    Capillary Refill Less than/equal to 3 seconds   Color Appropriate for race   Temperature Warm   Pedal Pulse Palpable   Wound Foot Left;Lateral;Proximal #5   Date First Assessed/Time First Assessed: 08/03/21 1057   Present on Hospital Admission: Yes  Location: Foot  Wound Location Orientation: Left;Lateral;Proximal  Wound Description: #5   Wound Image    Wound Length (cm) 2 cm   Wound Width (cm) 1.7 cm   Wound Depth (cm) 0.3 cm   Wound Surface Area (cm^2) 3.4 cm^2   Change in Wound Size % -63.46   Wound Volume (cm^3) 1.02 cm^3   Wound Healing % -145   Wound Assessment Slough;Pink/red   Drainage Amount Moderate   Drainage Description Serous; Yellow   Wound Odor None   Maribel-Wound/Incision Assessment Maceration; Hyperkeratosis (Callous); Hyperpigmented; Induration   Edges Epibole (rolled edges)     Visit Vitals  BP (!) 193/104 (BP 1 Location: Right upper arm, BP Patient Position: Sitting)   Pulse 63   Temp 97.3 °F (36.3 °C)   Resp 16

## 2022-01-04 NOTE — DISCHARGE INSTRUCTIONS
Discharge Instructions for  [facility]  [address] [place], [address] [facility]  Telephone: [phone]    NAME:  Cristóbal Guerrero  YOB: 1980  DATE:  12/14/2021    [x] Home Healthcare: TO: [facility]    Wound Cleansing:   Do not scrub or use excessive force. Cleanse wound prior to applying a clean dressing with:    [] Normal Saline   [] Keep Wound Dry in Shower      [] Wound Cleanser (***)  [] May Shower at Discharge   [] cleanse with Ary Muckle and Ary Muckle baby shampoo lather leave 2-3 then rinse with water, pat dry and redress wound. 1- Dressings:           Wound Location left  lateral foot wound    Apply Primary Dressing:  Betadine soaked gauze, dry gauze, roll gauze, tape. Foam to bilateral heels for protection                                           Change dressing:   [] Daily      [x] Every Other Day   [] Three times per week  [] Once a week   [] Do Not Change Dressing     [] Other:    Off-Loading:   [x] Off-loading when [] walking  [x] in bed [] sitting  Need to continue to wear bunny boots for off loading    Dietary:  [x] Diet as tolerated: [] Diabetic Diet:   [x] Increase Protein: examples ( Meat, cheese, eggs, greek yogurt, premier protein drink, fish, nuts )   [] Other:    Return Appointment:  [] Wound assessment with Nurse at wound center in *** days     [x] Return Appointment: With Dr. Krystal Milner 2  week         Will send referral to:  For formal OLI's bilaterally,  more multiple wound areas, DTI's to right and left heels    Dr. Mark Lopez  Vascular and Interventional Radiology  [facility]  [address], [address] [place] [address]  [phone]  [facility] [phone]     Electronically signed on 12/14/2021     [address] [facility] [address] Information: Should you experience any significant changes in your wound(s) or have questions about your wound care, please contact the [address] [place] [address] [place] at [address] [place] [address] 8:00 am - 4:30. If you need help with your wound outside these hours and cannot wait until we are again available, contact your PCP or go to the hospital emergency room. PLEASE NOTE: IF YOU ARE UNABLE TO OBTAIN WOUND SUPPLIES, CONTINUE TO USE THE SUPPLIES YOU HAVE AVAILABLE UNTIL YOU ARE ABLE TO REACH US. IT IS MOST IMPORTANT TO KEEP THE WOUND COVERED AT ALL TIMES.      Physician Signature:_______________________  Dr. Kal Brown

## 2022-01-04 NOTE — WOUND CARE
Wound Center  Progress Note    Subjective:   Patient is for follow up of LE ulcer and now has new ulcers(s). He wore dress shoes and now R foot flared. Patient is doing well. No complaints of wound pain. He has had car issues and has not been able to make it to the wound center. He recently found a transportation shuttle to bring him as his car conts to be worked on. There have been no changes in patient's medical history in the interim. ROS:  No fever or chills. No rash. No pain at site of wound    Objective:   General: NAD  Psych: Cooperative, no anxiety or depression  Neuro: Alert, oriented to person/place/situation. Otherwise nonfocal.  Extremities: Bilateral mild pitting edema is noted. Skin color is normal.   Vascular exam:  No gross changes in pedal pulses. Capillary refill is intact, <3sec. Dermatologic:  Skin color appears normal for patient. Skin turgor is normal. Dystrophic nails are seen on the feet bilaterally. Ulcer Description:   Measurement: in cm pre/post debridement:  2.0 x 1.7 x 0.3 / same inc depth to 0.4 at base of L 5th metatarsal R 5th met and other wounds healed. 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 Cont an air purifier (ionizer) that he feels is helpful and that his apt had very bad air and has not had clean air filters. Strictly to avoid wearing shoes. Will see in 2 wks. Plan is reviewed with patient who expresses understanding. Questions were answered. Kwadwo Euceda DPM        Ulcer assessment: Due to presence of necrotic tissue within the wound bed, ulcer requires debridement.     Procedure: Debridement:   The indication for debridement was reviewed with patient. Risks of procedure (bleeding, infection, pain) were discussed with patient and consent signed. Questions were answered    Subcutaneous excisional debridement   Indication: to remove necrotic tissue/ devitalized tissue/ soft eschar/ infected tissue/obtain deep wound culture through subcutaneous layer of wound bed  Consent in chart   Anesthesia: Topical 2% lidocaine jelly  Instrument: 15 Blade,    Residual Necrosis: Present and scored   Bleeding: <1ml   Hemostasis: Pressure   Patient tolerated procedure well   Procedural Pain: none  Post - procedural pain: none    Post debridement measurements: see progress note.   Surface area debrided: <20 sq. cm

## 2022-01-18 ENCOUNTER — HOSPITAL ENCOUNTER (OUTPATIENT)
Dept: WOUND CARE | Age: 42
Discharge: HOME | End: 2022-01-18

## 2022-01-18 NOTE — DISCHARGE INSTRUCTIONS
Discharge Instructions for  [facility]  [address] [place], [address] [facility]  Telephone: [phone]    NAME:  Roopa Menendez  YOB: 1980  DATE:  1/18/2022    [x] Home Healthcare: TO: [facility]    Wound Cleansing:   Do not scrub or use excessive force. Cleanse wound prior to applying a clean dressing with:    [] Normal Saline   [] Keep Wound Dry in Shower      [] Wound Cleanser (***)  [] May Shower at Discharge   [] cleanse with Eliud Hoops and Eliud Hoops baby shampoo lather leave 2-3 then rinse with water, pat dry and redress wound. 1- Dressings:           Wound Location left  lateral foot wound    Apply Primary Dressing:  Bisi to wound base, Betadine to michelle wound,gauze, dry gauze, roll gauze, tape, or mepilex foam    Foam to bilateral heels for protection                                           Change dressing:   [] Daily      [x] Every Other Day   [] Three times per week  [] Once a week   [] Do Not Change Dressing     [] Other:    Off-Loading:   [x] Off-loading when [] walking  [x] in bed [] sitting  Need to continue to wear bunny boots for off loading    Dietary:  [x] Diet as tolerated: [] Diabetic Diet:   [x] Increase Protein: examples ( Meat, cheese, eggs, greek yogurt, premier protein drink, fish, nuts )   [] Other:    Return Appointment:  [] Wound assessment with Nurse at wound center in *** days     [x] Return Appointment: With Dr. Thang Duke 2  week         Will send referral to:  For formal OLI's bilaterally,  more multiple wound areas, DTI's to right and left heels    Dr. Teodoro Gorman  Vascular and Interventional Radiology  [facility]  [address], [address] [place] [address]  [phone]  [facility]  [phone]     Electronically signed on 1/18/2022     [address] [place] [address] Information: Should you experience any significant changes in your wound(s) or have questions about your wound care, please contact the [address] [place] [address] at [address] [place] [address] 8:00 am - 4:30. If you need help with your wound outside these hours and cannot wait until we are again available, contact your PCP or go to the hospital emergency room. PLEASE NOTE: IF YOU ARE UNABLE TO OBTAIN WOUND SUPPLIES, CONTINUE TO USE THE SUPPLIES YOU HAVE AVAILABLE UNTIL YOU ARE ABLE TO REACH US. IT IS MOST IMPORTANT TO KEEP THE WOUND COVERED AT ALL TIMES.      Physician Signature:_______________________  Dr. Charis Epley

## 2022-02-01 NOTE — WOUND CARE
[facility] home [facility] nurse called, stated patients wound has a odor to it, patient wants [facility] to only come once a week and then he has a caregiver do the dressing changes the rest of the week, but according to the [facility] nurse \"Cleo\", caregiver is not doing the dressing properly. Patient has not been seen in a month, will call to try and reschedule the patient to be seen in the wound care center.

## 2022-02-02 ENCOUNTER — DOCUMENTATION ONLY (OUTPATIENT)
Dept: WOUND CARE | Age: 42
End: 2022-02-02

## 2022-02-02 NOTE — PROGRESS NOTES
Tanja Luna; nurse from [facility] on 2/1/2022 reports patient has odor coming from his wound and needs to discuss with nurse . Message delivered to Emeka Bradshaw RN for further handling and to return Cleo's call @ [phone]. Per Gloria VALDIVIARN spoke with Tanja Luna on 2/1/2022 in regards to patient wound and Gloria informed [facility] nurse that patient has not been seen by our office in almost a month so patient would need to return to office to been seen by provider before any further orders can be made. I called patient patient today 2/2/2022 and spoke with him in regards to scheduling an appointment with Dr. Rosi Rothman for 2/8/2022, pt states would not be able to come in @ the 9:30am time due to transportation issues. Informed patient that was the only time available for Dr. Rosi Rothman, then offered for patient to be seen by another provider @ 11 am on Friday 2/4/2022, pt stated did not want to commit to the date and time and will call back to check if transportation can bring him on 2/4/22.

## 2022-03-18 PROBLEM — M86.172 ACUTE OSTEOMYELITIS OF METATARSAL BONE OF LEFT FOOT (HCC): Status: ACTIVE | Noted: 2019-12-14

## 2022-03-19 PROBLEM — S14.109S QUADRIPLEGIA, POST-TRAUMATIC (HCC): Status: ACTIVE | Noted: 2019-12-14

## 2022-03-19 PROBLEM — G82.50 QUADRIPLEGIA, POST-TRAUMATIC (HCC): Status: ACTIVE | Noted: 2019-12-14

## 2022-03-22 ENCOUNTER — HOSPITAL ENCOUNTER (OUTPATIENT)
Dept: WOUND CARE | Age: 42
Discharge: HOME | End: 2022-03-22
Payer: COMMERCIAL

## 2022-03-22 VITALS
SYSTOLIC BLOOD PRESSURE: 105 MMHG | WEIGHT: 160 LBS | TEMPERATURE: 97.3 F | HEART RATE: 73 BPM | BODY MASS INDEX: 22.96 KG/M2 | DIASTOLIC BLOOD PRESSURE: 60 MMHG | RESPIRATION RATE: 16 BRPM

## 2022-03-22 PROCEDURE — 11042 DBRDMT SUBQ TIS 1ST 20SQCM/<: CPT

## 2022-03-22 RX ORDER — CEPHALEXIN 500 MG/1
500 CAPSULE ORAL 3 TIMES DAILY
COMMUNITY
End: 2022-04-12 | Stop reason: ALTCHOICE

## 2022-03-22 NOTE — WOUND CARE
03/22/22 1128   Wound Foot Left;Lateral;Proximal #5 03/22/22   Date First Assessed/Time First Assessed: 03/22/22 1051   Present on Hospital Admission: Yes  Primary Wound Type: Diabetic Ulcer  Location: Foot  Wound Location Orientation: Left;Lateral;Proximal  Wound Description: #5  Date of First Observation: 03/22/22   Dressing/Treatment Betadine swabs/Povidone Iodine;Silicone border   Wound Foot Right;Plantar #1   Date First Assessed/Time First Assessed: 03/22/22 1052   Present on Hospital Admission: Yes  Primary Wound Type: Diabetic Ulcer  Location: Foot  Wound Location Orientation: Right;Plantar  Wound Description: #1   Dressing/Treatment Other (Comment); Silicone border  (gentamicin ointment)   Discharge Condition: Stable     Pain: 1    Ambulatory Status: Wheelchair     Discharge Destination: Home     Transportation: Car    Accompanied by: Self     Discharge instructions reviewed with Patient and copy or written instructions have been provided. All questions/concerns have been addressed at this time.

## 2022-03-22 NOTE — WOUND CARE
03/22/22 1052   Right Leg Edema Point of Measurement   Leg circumference 28.5 cm   Ankle circumference 22 cm   Left Leg Edema Point of Measurement   Leg circumference 28 cm   Ankle circumference 22 cm   LLE Peripheral Vascular    Capillary Refill Less than/equal to 3 seconds   Color Appropriate for race   Temperature Warm   Pedal Pulse Palpable   RLE Peripheral Vascular    Capillary Refill Less than/equal to 3 seconds   Color Appropriate for race   Temperature Warm   Pedal Pulse Palpable   Wound Foot Left;Lateral;Proximal #5 03/22/22   Date First Assessed/Time First Assessed: 03/22/22 1051   Present on Hospital Admission: Yes  Primary Wound Type: Diabetic Ulcer  Location: Foot  Wound Location Orientation: Left;Lateral;Proximal  Wound Description: #5  Date of First Observation: 03/22/22   Wound Image    Wound Etiology Diabetic   Dressing Status Clean;Dry; Intact   Wound Length (cm) 0.1 cm   Wound Width (cm) 0.1 cm   Wound Depth (cm) 0.1 cm   Wound Surface Area (cm^2) 0.01 cm^2   Wound Volume (cm^3) 0.001 cm^3   Wound Assessment Epithelialization;Pink/red   Drainage Amount None   Wound Odor None   Maribel-Wound/Incision Assessment Hyperkeratosis (Callous)   Edges Attached edges   Wound Foot Right;Plantar #1   Date First Assessed/Time First Assessed: 03/22/22 1052   Present on Hospital Admission: Yes  Primary Wound Type: Diabetic Ulcer  Location: Foot  Wound Location Orientation: Right;Plantar  Wound Description: #1   Wound Image    Wound Etiology Diabetic   Dressing Status Old drainage noted   Cleansed Cleansed with saline   Wound Length (cm) 0.9 cm   Wound Width (cm) 1.2 cm   Wound Depth (cm) 0.1 cm   Wound Surface Area (cm^2) 1.08 cm^2   Wound Volume (cm^3) 0.108 cm^3   Wound Assessment Slough;Pink/red   Drainage Amount Moderate   Drainage Description Serosanguinous   Wound Odor None   Amribel-Wound/Incision Assessment Hyperkeratosis (Callous); Maceration   Edges Undefined edges   Wound Thickness Description Partial thickness     Visit Vitals  /60 (BP 1 Location: Right upper arm, BP Patient Position: At rest;Sitting)   Pulse 73   Temp 97.3 °F (36.3 °C)   Resp 16   Wt 72.6 kg (160 lb)   BMI 22.96 kg/m²

## 2022-03-22 NOTE — DISCHARGE INSTRUCTIONS
Discharge Instructions for  [facility]  [address] [place], [address] [facility]  Telephone: [phone]    NAME:  Lokesh Mistry  YOB: 1980  DATE:  1/4/2022    [x] Home Healthcare: TO: [facility]    Wound Cleansing:   Do not scrub or use excessive force. Cleanse wound prior to applying a clean dressing with:    [] Normal Saline   [] Keep Wound Dry in Shower      [] Wound Cleanser (***)  [] May Shower at Discharge   [] cleanse with Tretha [place] and Tretha Francisco baby shampoo lather leave 2-3 then rinse with water, pat dry and redress wound. 1- Dressings:           Wound Location left  lateral foot wound    Apply Primary Dressing:  Bisi to wound base, Betadine to michelle wound,gauze, dry gauze, roll gauze, tape, or mepilex foam    Foam to bilateral heels for protection                                           Change dressing:   [] Daily      [x] Every Other Day   [] Three times per week  [] Once a week   [] Do Not Change Dressing     [] Other:    Off-Loading:   [x] Off-loading when [] walking  [x] in bed [] sitting  Need to continue to wear bunny boots for off loading    Dietary:  [x] Diet as tolerated: [] Diabetic Diet:   [x] Increase Protein: examples ( Meat, cheese, eggs, greek yogurt, premier protein drink, fish, nuts )   [] Other:    Return Appointment:  [] Wound assessment with Nurse at wound center in *** days     [x] Return Appointment: With Dr. Cailin Leung 2  week         Will send referral to:  For formal OLI's bilaterally,  more multiple wound areas, DTI's to right and left heels    Dr. Liz Dee  Vascular and Interventional Radiology  [facility]  [facility] [address], [address] [facility]  [phone]   [phone]     Electronically signed on 1/4/2022     [address] [facility] Information: Should you experience any significant changes in your wound(s) or have questions about your wound care, please contact the [address] [place] [address] at [address] [place] [address] 8:00 am - 4:30. If you need help with your wound outside these hours and cannot wait until we are again available, contact your PCP or go to the hospital emergency room. PLEASE NOTE: IF YOU ARE UNABLE TO OBTAIN WOUND SUPPLIES, CONTINUE TO USE THE SUPPLIES YOU HAVE AVAILABLE UNTIL YOU ARE ABLE TO REACH US. IT IS MOST IMPORTANT TO KEEP THE WOUND COVERED AT ALL TIMES.      Physician Signature:_______________________  Dr. Theodore Devoid

## 2022-03-22 NOTE — WOUND CARE
[facility] nurse called and asked if wound care could be done on patient 2-3x week due to staffing issues, nurse stated to at least go out 3x week due to patient unable to do own wound care and has no other resources. Nurse stated may wash wound with Stephanie Loose and Stephanie Loose baby shampoo, was not documented on discharge instructions.

## 2022-03-22 NOTE — WOUND CARE
Wound Center  Progress Note    Subjective:   Patient is for follow up of LE ulcer and now has new ulcers(s). He moved into a new appartment and hit his R foot on the doorway. Patient is doing well. No complaints of wound pain. He has had car issues and has not been able to make it to the wound center. He recently found a transportation shuttle to bring him as his car conts to be worked on. There have been no changes in patient's medical history in the interim. ROS:  No fever or chills. No rash. No pain at site of wound    Objective:   General: NAD  Psych: Cooperative, no anxiety or depression  Neuro: Alert, oriented to person/place/situation. Otherwise nonfocal.  Extremities: Bilateral mild pitting edema is noted. Skin color is normal.   Vascular exam:  No gross changes in pedal pulses. Capillary refill is intact, <3sec. Dermatologic:  Skin color appears normal for patient. Skin turgor is normal. Dystrophic nails are seen on the feet bilaterally. Ulcer Description:   Measurement: in cm pre/post debridement:  R 5th met 1.2 x 0.9 x 0.1 / same inc depth to 0.5  And L 5th met 0.1 x 0.1 x 0.1 /1.5 x 1.5 x 0.3  Mixed Granular/Fibrotic    Periwound: Calloused, nontender  Exudate: Moderate amount Serous exudate  Odor:  -    Assessment:  Diabetes with foot ulcer  E11.621  Ulcer L 5th met L97.522   Ulcer R 5th met L97.512   DM w/ PVD  E11.51    Plan:    Dressing: Gent/GZ on R and BW2D L. Order for xrays as Wound had deepened to near bone. Strictly to avoid wearing shoes. Will see in 2 wks. Plan is reviewed with patient who expresses understanding. Questions were answered. Kwadwo Soriano DPM        Ulcer assessment: Due to presence of necrotic tissue within the wound bed, ulcer requires debridement. Procedure: Debridement:   The indication for debridement was reviewed with patient. Risks of procedure (bleeding, infection, pain) were discussed with patient and consent signed.  Questions were answered    Subcutaneous excisional debridement   Indication: to remove necrotic tissue/ devitalized tissue/ soft eschar/ infected tissue/obtain deep wound culture through subcutaneous layer of wound bed  Consent in chart   Anesthesia: Topical 2% lidocaine jelly  Instrument: 15 Blade,    Residual Necrosis: Present and scored   Bleeding: <1ml   Hemostasis: Pressure   Patient tolerated procedure well   Procedural Pain: none  Post - procedural pain: none    Post debridement measurements: see progress note.   Surface area debrided: <20 sq. cm

## 2022-04-05 ENCOUNTER — DOCUMENTATION ONLY (OUTPATIENT)
Dept: WOUND CARE | Age: 42
End: 2022-04-05

## 2022-04-05 NOTE — PROGRESS NOTES
Patient called office on 4/4/2022 stating he misplaced Rx for Gentamicin Ointment, he will not be able to come to regular visit today due to transportation issues. He requested to call in Rx to [facility] that is listed in chart, verification of  Pharmacy was done and updated with patient. Please contact patient if able to call in Rx once discuss with Dr. Yfn Golden.

## 2022-04-12 ENCOUNTER — HOSPITAL ENCOUNTER (OUTPATIENT)
Dept: WOUND CARE | Age: 42
Discharge: HOME | End: 2022-04-12
Payer: COMMERCIAL

## 2022-04-12 VITALS
DIASTOLIC BLOOD PRESSURE: 100 MMHG | SYSTOLIC BLOOD PRESSURE: 184 MMHG | HEART RATE: 71 BPM | TEMPERATURE: 97.8 F | RESPIRATION RATE: 18 BRPM

## 2022-04-12 PROCEDURE — 11042 DBRDMT SUBQ TIS 1ST 20SQCM/<: CPT

## 2022-04-12 NOTE — WOUND CARE
04/12/22 1217   Wound Foot Left;Lateral;Proximal #5 03/22/22   Date First Assessed/Time First Assessed: 03/22/22 1051   Present on Hospital Admission: Yes  Primary Wound Type: Diabetic Ulcer  Location: Foot  Wound Location Orientation: Left;Lateral;Proximal  Wound Description: #5  Date of First Observation: 03/22/22   Dressing/Treatment Betadine swabs/Povidone Iodine;Gauze dressing/dressing sponge;Tape change; Foam   Wound Foot Right;Plantar #1   Date First Assessed/Time First Assessed: 03/22/22 1052   Present on Hospital Admission: Yes  Primary Wound Type: Diabetic Ulcer  Location: Foot  Wound Location Orientation: Right;Plantar  Wound Description: #1   Dressing/Treatment Betadine swabs/Povidone Iodine;Foam;Gauze dressing/dressing sponge;Tape/Soft cloth adhesive tape     Discharge Condition: Stable     Pain: 3    Ambulatory Status: Wheelchair     Discharge Destination: Home     Transportation: Car    Accompanied by: Self     Discharge instructions reviewed with Patient and copy or written instructions have been provided. All questions/concerns have been addressed at this time.

## 2022-04-12 NOTE — DISCHARGE INSTRUCTIONS
Discharge Instructions for  [facility]  [address] [place], [address] [facility]  Telephone: [phone]    NAME:  Tomasa Montero  YOB: 1980  DATE:  3/22/2022    [x] Home Healthcare: TO: [facility]    Wound Cleansing:   Do not scrub or use excessive force. Cleanse wound prior to applying a clean dressing with:    [] Normal Saline   [] Keep Wound Dry in Shower      [] Wound Cleanser (***)  [] May Shower at Discharge   [x] cleanse with Jerilee Hey and Jerilee Hey baby shampoo lather leave 2-3 then rinse with water, pat dry and redress wound. 1- Dressings:           Wound Location left lateral foot wound    Apply Primary Dressing:     Betadine,  dry gauze, roll gauze, tape, or mepilex foam or super absorber with boarder ex: eclipse    Foam to bilateral heels for protection                                             2- Dressings:           Wound Location right plantar foot     Apply Primary Dressing:        Gentamicin, gauze, mepliex foam boarder, or super absorber with boarder ex:  Eclipse      Change dressing:   [] Daily      [] Every Other Day   [x] Three times per week  [] Once a week   [] Do Not Change Dressing     [] Other:    [x] Off-loading when [] walking  [x] in bed [] sitting    Need to continue to wear bunny boots for off loading    Dietary:  [x] Diet as tolerated: [] Diabetic Diet:   [x] Increase Protein: examples ( Meat, cheese, eggs, greek yogurt, premier protein drink, fish, nuts )   [] Other:    Return Appointment:  [] Wound assessment with Nurse at wound center in *** days     [x] Return Appointment: With Dr. Satinder Goss 2  week           Electronically signed on 3/22/2022     [address] [facility] Information: Should you experience any significant changes in your wound(s) or have questions about your wound care, please contact the [address] [place] [address] at [address] [place] [address] Street 8:00 am - 4:30.   If you need help with your wound outside these hours and cannot wait until we are again available, contact your PCP or go to the hospital emergency room. PLEASE NOTE: IF YOU ARE UNABLE TO OBTAIN WOUND SUPPLIES, CONTINUE TO USE THE SUPPLIES YOU HAVE AVAILABLE UNTIL YOU ARE ABLE TO REACH US. IT IS MOST IMPORTANT TO KEEP THE WOUND COVERED AT ALL TIMES.      Physician Signature:_______________________  Dr. Ana Wright

## 2022-04-12 NOTE — WOUND CARE
Wound Center  Progress Note    Subjective:   Patient is for follow up of LE ulcer and now has new ulcers(s). He moved into a new appartment and hit his R foot on the doorway. Patient is doing well. No complaints of wound pain. He has had car issues and has not been able to make it to the wound center. He recently found a transportation shuttle to bring him as his car conts to be worked on. There have been no changes in patient's medical history in the interim. ROS:  No fever or chills. No rash. No pain at site of wound    Objective:   General: NAD  Psych: Cooperative, no anxiety or depression  Neuro: Alert, oriented to person/place/situation. Otherwise nonfocal.  Extremities: Bilateral mild pitting edema is noted. Skin color is normal.   Vascular exam:  No gross changes in pedal pulses. Capillary refill is intact, <3sec. Dermatologic:  Skin color appears normal for patient. Skin turgor is normal. Dystrophic nails are seen on the feet bilaterally. Ulcer Description:   Measurement: in cm pre/post debridement:  R 5th met 0.1 x 0.1 x 0.1 / epith  And L 5th met 0.4 x 0.3 x 0.1 /same inc depth to 0.4 (to near bone)  Mixed Granular/Fibrotic    Periwound: Calloused, nontender  Exudate: Moderate amount Serous exudate  Odor:  -    Assessment:  Diabetes with foot ulcer  E11.621  Ulcer L 5th met L97.522 - epith   Ulcer R 5th met L97.512   DM w/ PVD  E11.51    Plan:    Dressing: Gent/GZ on R . Order for xrays as Wound had deepened to near bone (still pending - did not yet get). Strictly to avoid wearing shoes. Will see in 2 wks. Plan is reviewed with patient who expresses understanding. Questions were answered. Kwadwo Connell DPM        Ulcer assessment: Due to presence of necrotic tissue within the wound bed, ulcer requires debridement. Procedure: Debridement:   The indication for debridement was reviewed with patient.  Risks of procedure (bleeding, infection, pain) were discussed with patient and consent signed. Questions were answered    Subcutaneous excisional debridement   Indication: to remove necrotic tissue/ devitalized tissue/ soft eschar/ infected tissue/obtain deep wound culture through subcutaneous layer of wound bed  Consent in chart   Anesthesia: Topical 2% lidocaine jelly  Instrument: 15 Blade,    Residual Necrosis: Present and scored   Bleeding: <1ml   Hemostasis: Pressure   Patient tolerated procedure well   Procedural Pain: none  Post - procedural pain: none    Post debridement measurements: see progress note.   Surface area debrided: <20 sq. cm

## 2022-04-12 NOTE — WOUND CARE
04/12/22 1135   Right Leg Edema Point of Measurement   Leg circumference 30 cm   Ankle circumference 21.6 cm   Left Leg Edema Point of Measurement   Leg circumference 29.8 cm   Ankle circumference 21.5 cm   LLE Peripheral Vascular    Capillary Refill Less than/equal to 3 seconds   Color Appropriate for race   Temperature Warm   Pedal Pulse Palpable   RLE Peripheral Vascular    Capillary Refill Less than/equal to 3 seconds   Color Appropriate for race   Temperature Warm   Pedal Pulse Palpable   Wound Foot Left;Lateral;Proximal #5 03/22/22   Date First Assessed/Time First Assessed: 03/22/22 1051   Present on Hospital Admission: Yes  Primary Wound Type: Diabetic Ulcer  Location: Foot  Wound Location Orientation: Left;Lateral;Proximal  Wound Description: #5  Date of First Observation: 03/22/22   Wound Image    Dressing Status Intact;Dry;Clean   Cleansed Cleansed with saline   Wound Length (cm) 0.1 cm   Wound Width (cm) 0.1 cm   Wound Depth (cm) 0.1 cm   Wound Surface Area (cm^2) 0.01 cm^2   Change in Wound Size % 0   Wound Volume (cm^3) 0.001 cm^3   Wound Healing % 0   Wound Assessment   (Hypigmentation)   Drainage Amount None   Wound Odor None   Maribel-Wound/Incision Assessment Hyperkeratosis (Callous)   Edges Attached edges   Wound Foot Right;Plantar #1   Date First Assessed/Time First Assessed: 03/22/22 1052   Present on Hospital Admission: Yes  Primary Wound Type: Diabetic Ulcer  Location: Foot  Wound Location Orientation: Right;Plantar  Wound Description: #1   Wound Image    Dressing Status Clean; Intact;Dry   Cleansed Cleansed with saline   Wound Length (cm) 0.3 cm   Wound Width (cm) 0.4 cm   Wound Depth (cm) 0.1 cm   Wound Surface Area (cm^2) 0.12 cm^2   Change in Wound Size % 88.89   Wound Volume (cm^3) 0.012 cm^3   Wound Healing % 89   Wound Assessment   (calloused)   Drainage Amount None   Wound Odor None   Maribel-Wound/Incision Assessment Hyperkeratosis (Callous)   Edges Attached edges

## 2022-04-12 NOTE — WOUND CARE
Emptied patients leg bag 450 cc of clear yellow urine, nurse in room assisting patient to readjust suprapubic to see if that will help with his elevated BP.

## 2022-04-26 ENCOUNTER — HOSPITAL ENCOUNTER (OUTPATIENT)
Dept: WOUND CARE | Age: 42
Discharge: HOME | End: 2022-04-26

## 2022-04-26 NOTE — DISCHARGE INSTRUCTIONS
Discharge Instructions for  [facility]  [address] [place], [address] [facility]  Telephone: [phone]    NAME:  Sally Rosario  YOB: 1980  DATE:  4/26/2022    [x] Home Healthcare: TO: [facility]    Wound Cleansing:   Do not scrub or use excessive force. Cleanse wound prior to applying a clean dressing with:    [] Normal Saline   [] Keep Wound Dry in Shower      [] Wound Cleanser (***)  [] May Shower at Discharge   [x] cleanse with Gilmer Mars and Camelia Mars baby shampoo lather leave 2-3 then rinse with water, pat dry and redress wound. 1- Dressings:           Wound Location left lateral foot wound    Apply Primary Dressing:     Betadine,  dry gauze, roll gauze, tape, or mepilex foam or super absorber with boarder ex: eclipse    Foam to bilateral heels for protection                                             2- Dressings:           Wound Location right plantar foot     Apply Primary Dressing:        Gentamicin, gauze, mepliex foam boarder, or super absorber with boarder ex:  Eclipse      Change dressing:   [] Daily      [] Every Other Day   [x] Three times per week  [] Once a week   [] Do Not Change Dressing     [] Other:    [x] Off-loading when [] walking  [x] in bed [] sitting    Need to continue to wear bunny boots for off loading    Dietary:  [x] Diet as tolerated: [] Diabetic Diet:   [x] Increase Protein: examples ( Meat, cheese, eggs, greek yogurt, premier protein drink, fish, nuts )   [] Other:    Return Appointment:  [] Wound assessment with Nurse at wound center in *** days     [x] Return Appointment: With Dr. Rosita Buchanan 2  week           Electronically signed on 4/26/2022     [address] [facility] Information: Should you experience any significant changes in your wound(s) or have questions about your wound care, please contact the [address] [place] [address] at [address] [place] [address] 8:00 am - 4:30.   If you need help with your wound outside these hours and cannot wait until we are again available, contact your PCP or go to the hospital emergency room. PLEASE NOTE: IF YOU ARE UNABLE TO OBTAIN WOUND SUPPLIES, CONTINUE TO USE THE SUPPLIES YOU HAVE AVAILABLE UNTIL YOU ARE ABLE TO REACH US. IT IS MOST IMPORTANT TO KEEP THE WOUND COVERED AT ALL TIMES.      Physician Signature:_______________________  Dr. Karlene Marina

## 2022-05-03 ENCOUNTER — HOSPITAL ENCOUNTER (OUTPATIENT)
Dept: WOUND CARE | Age: 42
Discharge: HOME | End: 2022-05-03

## 2022-05-03 NOTE — DISCHARGE INSTRUCTIONS
Discharge Instructions for  [facility] [place] [facility]  [address] [place], [address] [facility]  Telephone: [phone]    NAME:  Kaylee Munguia  YOB: 1980  DATE:  5/3/2022    [x] Home Healthcare: TO: [facility]    Wound Cleansing:   Do not scrub or use excessive force. Cleanse wound prior to applying a clean dressing with:    [] Normal Saline   [] Keep Wound Dry in Shower      [] Wound Cleanser (***)  [] May Shower at Discharge   [x] cleanse with Suzanne Angus and Suzanne Angus baby shampoo lather leave 2-3 then rinse with water, pat dry and redress wound. 1- Dressings:           Wound Location left lateral foot wound    Apply Primary Dressing:     Betadine,  dry gauze, roll gauze, tape, or mepilex foam or super absorber with boarder ex: eclipse    Foam to bilateral heels for protection                                             2- Dressings:           Wound Location right plantar foot     Apply Primary Dressing:        Gentamicin, gauze, mepliex foam boarder, or super absorber with boarder ex:  Eclipse      Change dressing:   [] Daily      [] Every Other Day   [x] Three times per week  [] Once a week   [] Do Not Change Dressing     [] Other:    [x] Off-loading when [] walking  [x] in bed [] sitting    Need to continue to wear bunny boots for off loading    Dietary:  [x] Diet as tolerated: [] Diabetic Diet:   [x] Increase Protein: examples ( Meat, cheese, eggs, greek yogurt, premier protein drink, fish, nuts )   [] Other:    Return Appointment:  [] Wound assessment with Nurse at wound center in *** days     [x] Return Appointment: With Dr. Alfreida Sandifer 2  week           Electronically signed on [address]: Should you experience any significant changes in your wound(s) or have questions about your wound care, please contact the [facility] at [address] [place] [address] 8:00 am - 4:30.   If you need help with your wound outside these hours and cannot wait until we are again available, contact your PCP or go to the hospital emergency room. PLEASE NOTE: IF YOU ARE UNABLE TO OBTAIN WOUND SUPPLIES, CONTINUE TO USE THE SUPPLIES YOU HAVE AVAILABLE UNTIL YOU ARE ABLE TO REACH US. IT IS MOST IMPORTANT TO KEEP THE WOUND COVERED AT ALL TIMES.      Physician Signature:_______________________  Dr. Velasquez Duque

## 2022-05-03 NOTE — DISCHARGE INSTRUCTIONS
Discharge Instructions for  [facility]  [address] [place], [address] [facility]  Telephone: [phone]    NAME:  Nelda Euceda  YOB: 1980  DATE:  4/12/2022    [x] Home Healthcare: TO: [facility]    Wound Cleansing:   Do not scrub or use excessive force. Cleanse wound prior to applying a clean dressing with:    [] Normal Saline   [] Keep Wound Dry in Shower      [] Wound Cleanser (***)  [] May Shower at Discharge   [x] cleanse with Delpha Jaswinder and Delpha Jaswinder baby shampoo lather leave 2-3 then rinse with water, pat dry and redress wound. 1- Dressings:           Wound Location left lateral foot wound    Apply Primary Dressing:     Betadine,  dry gauze, roll gauze, tape, or mepilex foam or super absorber with boarder ex: eclipse    Foam to bilateral heels for protection                                             2- Dressings:           Wound Location right plantar foot     Apply Primary Dressing:        Gentamicin, gauze, mepliex foam boarder, or super absorber with boarder ex:  Eclipse      Change dressing:   [] Daily      [] Every Other Day   [x] Three times per week  [] Once a week   [] Do Not Change Dressing     [] Other:    [x] Off-loading when [] walking  [x] in bed [] sitting    Need to continue to wear bunny boots for off loading    Dietary:  [x] Diet as tolerated: [] Diabetic Diet:   [x] Increase Protein: examples ( Meat, cheese, eggs, greek yogurt, premier protein drink, fish, nuts )   [] Other:    Return Appointment:  [] Wound assessment with Nurse at wound center in *** days     [x] Return Appointment: With Dr. Kamilah Ruggiero 2  week           Electronically signed on 4/12/2022     [address] [place] [address] Information: Should you experience any significant changes in your wound(s) or have questions about your wound care, please contact the [address] [place] [address] at [address] [place] [address] 8:00 am - 4:30.   If you need help with your wound outside these hours and cannot wait until we are again available, contact your PCP or go to the hospital emergency room. PLEASE NOTE: IF YOU ARE UNABLE TO OBTAIN WOUND SUPPLIES, CONTINUE TO USE THE SUPPLIES YOU HAVE AVAILABLE UNTIL YOU ARE ABLE TO REACH US. IT IS MOST IMPORTANT TO KEEP THE WOUND COVERED AT ALL TIMES.      Physician Signature:_______________________  Dr. Bladimir Moncada

## 2022-05-06 ENCOUNTER — DOCUMENTATION ONLY (OUTPATIENT)
Dept: WOUND CARE | Age: 42
End: 2022-05-06

## 2022-05-06 NOTE — PROGRESS NOTES
Chayito Faustin ([facility]) called office and LVM reports patient's wounds are healed and will be reducing frequency of wound care to weekly.  Please contact her at [phone] to further discuss

## 2022-09-29 ENCOUNTER — HOSPITAL ENCOUNTER (EMERGENCY)
Age: 42
Discharge: HOME | End: 2022-09-29
Attending: EMERGENCY MEDICINE
Payer: COMMERCIAL

## 2022-09-29 VITALS
OXYGEN SATURATION: 98 % | HEIGHT: 71 IN | HEART RATE: 76 BPM | DIASTOLIC BLOOD PRESSURE: 103 MMHG | TEMPERATURE: 97.7 F | SYSTOLIC BLOOD PRESSURE: 162 MMHG | WEIGHT: 157 LBS | BODY MASS INDEX: 21.98 KG/M2 | RESPIRATION RATE: 18 BRPM

## 2022-09-29 DIAGNOSIS — T83.010A SUPRAPUBIC CATHETER DYSFUNCTION, INITIAL ENCOUNTER (HCC): Primary | ICD-10-CM

## 2022-09-29 LAB
APPEARANCE UR: ABNORMAL
BACTERIA URNS QL MICRO: ABNORMAL /HPF
BILIRUB UR QL: NEGATIVE
COLOR UR: ABNORMAL
EPITH CASTS URNS QL MICRO: ABNORMAL /LPF
GLUCOSE UR STRIP.AUTO-MCNC: >1000 MG/DL
HGB UR QL STRIP: ABNORMAL
KETONES UR QL STRIP.AUTO: ABNORMAL MG/DL
LEUKOCYTE ESTERASE UR QL STRIP.AUTO: ABNORMAL
NITRITE UR QL STRIP.AUTO: POSITIVE
PH UR STRIP: 6 [PH] (ref 5–8)
PROT UR STRIP-MCNC: ABNORMAL MG/DL
RBC #/AREA URNS HPF: >100 /HPF (ref 0–5)
SP GR UR REFRACTOMETRY: 1.01 (ref 1–1.03)
UROBILINOGEN UR QL STRIP.AUTO: 0.2 EU/DL (ref 0.2–1)
WBC URNS QL MICRO: ABNORMAL /HPF (ref 0–4)

## 2022-09-29 PROCEDURE — 81001 URINALYSIS AUTO W/SCOPE: CPT

## 2022-09-29 PROCEDURE — 87086 URINE CULTURE/COLONY COUNT: CPT

## 2022-09-29 PROCEDURE — 99283 EMERGENCY DEPT VISIT LOW MDM: CPT

## 2022-09-29 NOTE — ED NOTES
20fr coude bonner catheter placed by Dr Pavithra Caro in suprapubic area, inflated with 10cc syringe. Pt tolerated well. 150ml of urine drained into drainage bag at time of insertion. Urine specimen obtained from new bonner and walked to lab.

## 2022-09-29 NOTE — ED TRIAGE NOTES
Pt presents into ed in own personal wheelchair, a&ox3, no acute distress, breaths even and unlabored c/o abdominal fullness and pain from suprapubic catheter being clogged and not draining for the past 24+ hours. Pt states he has an appointment with his urologist this morning but can not make it due to the pain.

## 2022-09-29 NOTE — ED NOTES
I have reviewed discharge instructions with the patient. The patient verbalized understanding. Pt.bonner catheter connected to stat lock on patient right upper leg.  Pt.left via personal motorized wheelchair
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
## 2022-10-02 LAB
BACTERIA SPEC CULT: NORMAL
CC UR VC: NORMAL
SERVICE CMNT-IMP: NORMAL

## 2022-10-04 NOTE — ED NOTES
Patient called the emergency department several times today asking to speak to provider regarding UA/culture results. I forwarded the message to the provider who saw the patient. Had no other involvement in the care of this patient.     Jerrod Mckenzie, DO 43 yo female presents to ER for 24 hours of right flank pain associated with urine frequency and nausea. Had labs,urine, CT scan and results reviewed. Findings of cecal diverticulitis, no UTI. No vomiting. Pt given IV abx in ER and dc'd on oral abx with instructions on diet, follow up and to return to ER for any worsening of symptoms.

## 2022-10-09 ENCOUNTER — HOSPITAL ENCOUNTER (EMERGENCY)
Age: 42
Discharge: HOME | End: 2022-10-09
Attending: STUDENT IN AN ORGANIZED HEALTH CARE EDUCATION/TRAINING PROGRAM
Payer: COMMERCIAL

## 2022-10-09 VITALS
SYSTOLIC BLOOD PRESSURE: 104 MMHG | WEIGHT: 160 LBS | DIASTOLIC BLOOD PRESSURE: 60 MMHG | OXYGEN SATURATION: 100 % | HEART RATE: 67 BPM | HEIGHT: 71 IN | BODY MASS INDEX: 22.4 KG/M2 | RESPIRATION RATE: 14 BRPM | TEMPERATURE: 97.6 F

## 2022-10-09 DIAGNOSIS — T83.090A OBSTRUCTION OF SUPRAPUBIC CATHETER, INITIAL ENCOUNTER (HCC): Primary | ICD-10-CM

## 2022-10-09 DIAGNOSIS — Z43.5 ENCOUNTER FOR SUPRAPUBIC CATHETER CARE (HCC): ICD-10-CM

## 2022-10-09 LAB
APPEARANCE UR: CLEAR
BACTERIA URNS QL MICRO: NEGATIVE /HPF
BILIRUB UR QL: NEGATIVE
COLOR UR: ABNORMAL
EPITH CASTS URNS QL MICRO: ABNORMAL /LPF
GLUCOSE UR STRIP.AUTO-MCNC: >1000 MG/DL
HGB UR QL STRIP: ABNORMAL
HYALINE CASTS URNS QL MICRO: ABNORMAL /LPF
KETONES UR QL STRIP.AUTO: NEGATIVE MG/DL
LEUKOCYTE ESTERASE UR QL STRIP.AUTO: ABNORMAL
NITRITE UR QL STRIP.AUTO: NEGATIVE
PH UR STRIP: 6 [PH] (ref 5–8)
PROT UR STRIP-MCNC: NEGATIVE MG/DL
RBC #/AREA URNS HPF: ABNORMAL /HPF (ref 0–5)
SP GR UR REFRACTOMETRY: 1 (ref 1–1.03)
UROBILINOGEN UR QL STRIP.AUTO: 0.2 EU/DL (ref 0.2–1)
WBC URNS QL MICRO: ABNORMAL /HPF (ref 0–4)

## 2022-10-09 PROCEDURE — 99283 EMERGENCY DEPT VISIT LOW MDM: CPT

## 2022-10-09 PROCEDURE — 81001 URINALYSIS AUTO W/SCOPE: CPT

## 2022-10-09 PROCEDURE — 87086 URINE CULTURE/COLONY COUNT: CPT

## 2022-10-09 NOTE — ED NOTES
Suprapubic catheter removed and replaced with a 22G catheter by Dr. Bari Izquierdo. Urine draining.  UA sent to lab
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
## 2022-10-10 LAB
BACTERIA SPEC CULT: NORMAL
CC UR VC: NORMAL
SERVICE CMNT-IMP: NORMAL

## 2022-10-14 ENCOUNTER — HOSPITAL ENCOUNTER (EMERGENCY)
Age: 42
Discharge: HOSPICE | End: 2022-10-14
Attending: EMERGENCY MEDICINE
Payer: COMMERCIAL

## 2022-10-14 VITALS
SYSTOLIC BLOOD PRESSURE: 153 MMHG | TEMPERATURE: 97.8 F | HEIGHT: 71 IN | BODY MASS INDEX: 22.41 KG/M2 | HEART RATE: 70 BPM | OXYGEN SATURATION: 100 % | WEIGHT: 160.05 LBS | RESPIRATION RATE: 18 BRPM | DIASTOLIC BLOOD PRESSURE: 119 MMHG

## 2022-10-14 DIAGNOSIS — T83.011A MALFUNCTION OF FOLEY CATHETER, INITIAL ENCOUNTER (HCC): Primary | ICD-10-CM

## 2022-10-14 LAB
APPEARANCE UR: ABNORMAL
BACTERIA URNS QL MICRO: ABNORMAL /HPF
BILIRUB UR QL: NEGATIVE
COLOR UR: ABNORMAL
EPITH CASTS URNS QL MICRO: ABNORMAL /LPF
GLUCOSE UR STRIP.AUTO-MCNC: NEGATIVE MG/DL
HGB UR QL STRIP: ABNORMAL
KETONES UR QL STRIP.AUTO: NEGATIVE MG/DL
LEUKOCYTE ESTERASE UR QL STRIP.AUTO: ABNORMAL
NITRITE UR QL STRIP.AUTO: POSITIVE
PH UR STRIP: 8 [PH] (ref 5–8)
PROT UR STRIP-MCNC: 100 MG/DL
RBC #/AREA URNS HPF: >100 /HPF (ref 0–5)
SP GR UR REFRACTOMETRY: 1.01 (ref 1–1.03)
UROBILINOGEN UR QL STRIP.AUTO: 1 EU/DL (ref 0.2–1)
WBC URNS QL MICRO: >100 /HPF (ref 0–4)

## 2022-10-14 PROCEDURE — 99283 EMERGENCY DEPT VISIT LOW MDM: CPT

## 2022-10-14 PROCEDURE — 87086 URINE CULTURE/COLONY COUNT: CPT

## 2022-10-14 PROCEDURE — 81001 URINALYSIS AUTO W/SCOPE: CPT

## 2022-10-14 PROCEDURE — 51702 INSERT TEMP BLADDER CATH: CPT

## 2022-10-14 RX ORDER — LEVOFLOXACIN 750 MG/1
750 TABLET ORAL DAILY
Qty: 7 TABLET | Refills: 0 | Status: SHIPPED | OUTPATIENT
Start: 2022-10-14 | End: 2022-10-21

## 2022-10-14 NOTE — ED NOTES
Per patient his blood pressure always runs high in the afternoon. Denies other sx of autonomic dysreflexia. Patient subprapubic draining appropriately.

## 2022-10-14 NOTE — ED NOTES
Pt provided discharge instructions, prescriptions, education and follow up information. Pt verbalizing understanding. Pt A&Ox4, RA. Pain controlled. Patient utilized wheelchair out of ER.

## 2022-10-14 NOTE — ED NOTES
Dr Clarence Salinas placed suprapubic cath. Secured with 6cm sterile water. Per patient request, new leg bag applied to patient. Draining appropriately. Urine sample sent to lab.

## 2022-10-14 NOTE — DISCHARGE INSTRUCTIONS
Return to the emergency department for any new or worsening symptoms, fever, or anything else you find concerning. Please take the antibiotics as directed.   Please follow-up with your urologist as soon as possible
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
## 2022-10-15 LAB
BACTERIA SPEC CULT: NORMAL
CC UR VC: NORMAL
SERVICE CMNT-IMP: NORMAL
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
## 2023-08-23 ENCOUNTER — HOSPITAL ENCOUNTER (EMERGENCY)
Facility: HOSPITAL | Age: 43
Discharge: HOME | End: 2023-08-23
Attending: EMERGENCY MEDICINE
Payer: COMMERCIAL

## 2023-08-23 VITALS
DIASTOLIC BLOOD PRESSURE: 72 MMHG | SYSTOLIC BLOOD PRESSURE: 116 MMHG | HEIGHT: 71 IN | BODY MASS INDEX: 22.4 KG/M2 | WEIGHT: 160 LBS | HEART RATE: 79 BPM | TEMPERATURE: 98 F | RESPIRATION RATE: 18 BRPM | OXYGEN SATURATION: 98 %

## 2023-08-23 DIAGNOSIS — L89.152 PRESSURE INJURY OF SACRAL REGION, STAGE 2 (HCC): Primary | ICD-10-CM

## 2023-08-23 PROCEDURE — 99282 EMERGENCY DEPT VISIT SF MDM: CPT

## 2023-08-23 ASSESSMENT — ENCOUNTER SYMPTOMS
COUGH: 0
SORE THROAT: 0
VOMITING: 0
SHORTNESS OF BREATH: 0
EYE PAIN: 0
DIARRHEA: 0
NAUSEA: 0
ABDOMINAL PAIN: 0

## 2023-08-23 ASSESSMENT — PAIN - FUNCTIONAL ASSESSMENT: PAIN_FUNCTIONAL_ASSESSMENT: NONE - DENIES PAIN

## 2023-08-23 NOTE — ED TRIAGE NOTES
Patient to ED per w/c, history paraplegia, states caregiver saw an open wound on his coccyx area. Had been red, but today it opened up and had odorous drainage.

## 2023-08-24 NOTE — ED PROVIDER NOTES
[facility] EMERGENCY DEPT  EMERGENCY DEPARTMENT ENCOUNTER      Pt Name: Derrick Montero  MRN: [id number]  [address] 1980  Date of evaluation: 8/23/2023  Provider: Karoline Lopez       Chief Complaint   Patient presents with    Wound Infection         HISTORY OF PRESENT ILLNESS   (Location/Symptom, Timing/Onset, Context/Setting, Quality, Duration, Modifying Factors, Severity)  Note limiting factors. 44-year-old male presents to ED with concern for wound. Patient presents to ED via wheelchair. He notes that he is a paraplegic and has a caregiver that comes to his house daily who told him that she was concerned about a wound versus abscess on his sacral area. He notes that he had started noticing a smell coming from that earlier today and had his caregiver check when she told him that she felt like he should go to the ER. He reports that his caregiver is not a nurse but just a friend that comes to take care of him. Otherwise denies any fevers, chills, nausea, vomiting or diarrhea. He has an appointment with wound care next week for a foot wound. Review of External Medical Records:     Nursing Notes were reviewed. REVIEW OF SYSTEMS    (2-9 systems for level 4, 10 or more for level 5)     Review of Systems   Constitutional:  Negative for chills and fever. HENT:  Negative for congestion, ear pain and sore throat. Eyes:  Negative for pain. Respiratory:  Negative for cough and shortness of breath. Cardiovascular:  Negative for chest pain. Gastrointestinal:  Negative for abdominal pain, diarrhea, nausea and vomiting. Genitourinary:  Negative for dysuria and flank pain. Musculoskeletal:  Negative for myalgias. Skin:  Positive for wound. Negative for rash. Neurological:  Negative for dizziness and headaches. Hematological:  Negative for adenopathy. Except as noted above the remainder of the review of systems was reviewed and negative.        PAST MEDICAL HISTORY

## 2023-08-24 NOTE — ED NOTES
I have reviewed discharge instructions with the patient. Opportunity for questions and clarification was provided. The patient verbalized understanding. Patient discharged out of the ED via W/C with no difficulty and in stable condition.         Bruna Mayo RN  08/23/23 7250

## 2023-08-24 NOTE — DISCHARGE INSTRUCTIONS
Continue to monitor symptoms at home. Change dressing daily. Take Advil or Tylenol as needed for pain. Follow-up with PCP and return with any changes or worsening.

## 2023-08-28 ENCOUNTER — HOSPITAL ENCOUNTER (OUTPATIENT)
Facility: HOSPITAL | Age: 43
Discharge: HOME | End: 2023-08-28
Payer: COMMERCIAL

## 2023-08-28 VITALS
SYSTOLIC BLOOD PRESSURE: 97 MMHG | HEART RATE: 83 BPM | BODY MASS INDEX: 22.4 KG/M2 | HEIGHT: 71 IN | TEMPERATURE: 98.2 F | WEIGHT: 160 LBS | DIASTOLIC BLOOD PRESSURE: 62 MMHG | RESPIRATION RATE: 18 BRPM

## 2023-08-28 DIAGNOSIS — L89.613 PRESSURE INJURY OF RIGHT HEEL, STAGE 3 (HCC): Primary | ICD-10-CM

## 2023-08-28 DIAGNOSIS — L89.153 PRESSURE INJURY OF SACRAL REGION, STAGE 3 (HCC): ICD-10-CM

## 2023-08-28 PROCEDURE — 99214 OFFICE O/P EST MOD 30 MIN: CPT

## 2023-08-28 PROCEDURE — 11042 DBRDMT SUBQ TIS 1ST 20SQCM/<: CPT

## 2023-08-28 RX ORDER — GINSENG 100 MG
CAPSULE ORAL ONCE
OUTPATIENT
Start: 2023-08-28 | End: 2023-08-28

## 2023-08-28 RX ORDER — LIDOCAINE HYDROCHLORIDE 20 MG/ML
JELLY TOPICAL ONCE
OUTPATIENT
Start: 2023-08-28 | End: 2023-08-28

## 2023-08-28 RX ORDER — LIDOCAINE 50 MG/G
OINTMENT TOPICAL ONCE
Status: CANCELLED | OUTPATIENT
Start: 2023-08-28 | End: 2023-08-28

## 2023-08-28 RX ORDER — GENTAMICIN SULFATE 1 MG/G
OINTMENT TOPICAL ONCE
OUTPATIENT
Start: 2023-08-28 | End: 2023-08-28

## 2023-08-28 RX ORDER — LIDOCAINE 50 MG/G
OINTMENT TOPICAL ONCE
OUTPATIENT
Start: 2023-08-28 | End: 2023-08-28

## 2023-08-28 RX ORDER — SODIUM CHLOR/HYPOCHLOROUS ACID 0.033 %
SOLUTION, IRRIGATION IRRIGATION ONCE
OUTPATIENT
Start: 2023-08-28 | End: 2023-08-28

## 2023-08-28 RX ORDER — LIDOCAINE 40 MG/G
CREAM TOPICAL ONCE
Status: CANCELLED | OUTPATIENT
Start: 2023-08-28 | End: 2023-08-28

## 2023-08-28 RX ORDER — GINSENG 100 MG
CAPSULE ORAL ONCE
Status: CANCELLED | OUTPATIENT
Start: 2023-08-28 | End: 2023-08-28

## 2023-08-28 RX ORDER — LIDOCAINE 40 MG/G
CREAM TOPICAL ONCE
OUTPATIENT
Start: 2023-08-28 | End: 2023-08-28

## 2023-08-28 RX ORDER — BETAMETHASONE DIPROPIONATE 0.05 %
OINTMENT (GRAM) TOPICAL ONCE
Status: CANCELLED | OUTPATIENT
Start: 2023-08-28 | End: 2023-08-28

## 2023-08-28 RX ORDER — LIDOCAINE HYDROCHLORIDE 20 MG/ML
JELLY TOPICAL ONCE
Status: CANCELLED | OUTPATIENT
Start: 2023-08-28 | End: 2023-08-28

## 2023-08-28 RX ORDER — GENTAMICIN SULFATE 1 MG/G
OINTMENT TOPICAL ONCE
Status: CANCELLED | OUTPATIENT
Start: 2023-08-28 | End: 2023-08-28

## 2023-08-28 RX ORDER — CLOBETASOL PROPIONATE 0.5 MG/G
OINTMENT TOPICAL ONCE
OUTPATIENT
Start: 2023-08-28 | End: 2023-08-28

## 2023-08-28 RX ORDER — BACITRACIN ZINC AND POLYMYXIN B SULFATE 500; 1000 [USP'U]/G; [USP'U]/G
OINTMENT TOPICAL ONCE
OUTPATIENT
Start: 2023-08-28 | End: 2023-08-28

## 2023-08-28 RX ORDER — BACITRACIN ZINC AND POLYMYXIN B SULFATE 500; 1000 [USP'U]/G; [USP'U]/G
OINTMENT TOPICAL ONCE
Status: CANCELLED | OUTPATIENT
Start: 2023-08-28 | End: 2023-08-28

## 2023-08-28 RX ORDER — IBUPROFEN 200 MG
TABLET ORAL ONCE
OUTPATIENT
Start: 2023-08-28 | End: 2023-08-28

## 2023-08-28 RX ORDER — LIDOCAINE HYDROCHLORIDE 40 MG/ML
SOLUTION TOPICAL ONCE
OUTPATIENT
Start: 2023-08-28 | End: 2023-08-28

## 2023-08-28 RX ORDER — CLOBETASOL PROPIONATE 0.5 MG/G
OINTMENT TOPICAL ONCE
Status: CANCELLED | OUTPATIENT
Start: 2023-08-28 | End: 2023-08-28

## 2023-08-28 RX ORDER — IBUPROFEN 200 MG
TABLET ORAL ONCE
Status: CANCELLED | OUTPATIENT
Start: 2023-08-28 | End: 2023-08-28

## 2023-08-28 RX ORDER — SODIUM CHLOR/HYPOCHLOROUS ACID 0.033 %
SOLUTION, IRRIGATION IRRIGATION ONCE
Status: CANCELLED | OUTPATIENT
Start: 2023-08-28 | End: 2023-08-28

## 2023-08-28 RX ORDER — BETAMETHASONE DIPROPIONATE 0.05 %
OINTMENT (GRAM) TOPICAL ONCE
OUTPATIENT
Start: 2023-08-28 | End: 2023-08-28

## 2023-08-28 RX ORDER — LIDOCAINE HYDROCHLORIDE 40 MG/ML
SOLUTION TOPICAL ONCE
Status: CANCELLED | OUTPATIENT
Start: 2023-08-28 | End: 2023-08-28

## 2023-08-28 NOTE — DISCHARGE INSTRUCTIONS
Wound Clinic Physician Orders and Discharge Instructions  [address] [place] [address] S. [address]  Telephone: [phone]    NAME:  Shira Kaplan  YOB: 1980  MEDICAL RECORD NUMBER:  [id number]  DATE:  8/28/2023      Return Appointment:  [x] Dressing Supply Provider: Ramy  [] Home Healthcare:  [x] Return Appointment:  1 Week(s) Tuesday or Wednesday  [] Nurse Visit:     [] Discharge from [facility]: [] Healed        [] Refer to Provider:         [] Consult    Follow-up Information:  [] Ordered Tests:   [] Referrals:   [] Rx:   [] Other:       Wound Cleansing:   Do not scrub or use excessive force. Cleanse wound prior to applying a clean dressing with:  [x] Normal Saline or   [] Keep Wound Dry in Shower     [] Wound Cleanser   [x] Cleanse wound with Mild Soap & Water    [] Other:       Topical Treatments:  Do not apply lotions, creams, or ointments to wound bed unless directed. [] Apply moisturizing lotion to skin surrounding the wound prior to dressing change.  [] Apply antifungal ointment to skin surrounding the wound prior to dressing change.  [] Apply thin film of moisture barrier ointment to skin immediately around wound.   [] Apply Betadine to skin immediately around wound   [] Other:      Dressings:           Wound Location Sacrum, R Heel    [x] Apply Primary Dressing:       [] MediHoney Gel [] MediHoney Alginate  [] Calcium Alginate with Silver   [] Calcium Alginate without silver   [] Collagen with silver [] Collagen without Silver    [] Santyl with moist saline gauze     [x] Hydrofera Blue (cut to size and moistened with normal saline)  [] Hydrofera Blue Ready (cut to size)      [] Normal Saline wet to dry  [] Betadine wet to dry    [] Hydrogel  [] Mepitel     [] Bactroban/Mupirocin   [] Iodoform Packing Strip [] Plain Packing Strip   [] Skin Sub:   [] Other:      [x] Cover and Secure with:     [x] Gauze [x] Viraj []

## 2023-08-28 NOTE — WOUND CARE
Wound Clinic Physician Orders and Discharge Instructions  [address] [place], [place] [address] S. [address]  Telephone: [phone]    NAME:  Shira Kaplan  YOB: 1980  MEDICAL RECORD NUMBER:  [id number]  DATE:  8/28/2023      Return Appointment:  [x] Dressing Supply Provider: Ramy  [] Home Healthcare:  [x] Return Appointment:  1 Week(s) Tuesday or Wednesday  [] Nurse Visit:     [] Discharge from [facility]: [] Healed        [] Refer to Provider:         [] Consult    Follow-up Information:  [] Ordered Tests:   [] Referrals:   [] Rx:   [] Other:       Wound Cleansing:   Do not scrub or use excessive force. Cleanse wound prior to applying a clean dressing with:  [x] Normal Saline or   [] Keep Wound Dry in Shower     [] Wound Cleanser   [x] Cleanse wound with Mild Soap & Water    [] Other:       Topical Treatments:  Do not apply lotions, creams, or ointments to wound bed unless directed. [] Apply moisturizing lotion to skin surrounding the wound prior to dressing change.  [] Apply antifungal ointment to skin surrounding the wound prior to dressing change.  [] Apply thin film of moisture barrier ointment to skin immediately around wound.   [] Apply Betadine to skin immediately around wound   [] Other:      Dressings:           Wound Location Sacrum, R Heel    [x] Apply Primary Dressing:       [] MediHoney Gel [] MediHoney Alginate  [] Calcium Alginate with Silver   [] Calcium Alginate without silver   [] Collagen with silver [] Collagen without Silver    [] Santyl with moist saline gauze     [x] Hydrofera Blue (cut to size and moistened with normal saline)  [] Hydrofera Blue Ready (cut to size)      [] Normal Saline wet to dry  [] Betadine wet to dry    [] Hydrogel  [] Mepitel     [] Bactroban/Mupirocin   [] Iodoform Packing Strip [] Plain Packing Strip   [] Skin Sub:   [] Other:      [x] Cover and Secure with:     [x] Gauze [x] Viraj []

## 2023-08-28 NOTE — WOUND CARE
[facility] [address] f: [phone] f: [phone] p: [phone] [email or web address]      Ordering Center:     [facility]  [facility] [address] [address] [address]  [address] [place] [address] Dept: [address] [place] [address] [phone]    Patient Information:      Ugo Mccormick  [address] [facility] [address] [address] [phone]   : 1980  AGE: 36 y.o. GENDER: male   EPISODE DATE: 2023    Insurance:      PRIMARY INSURANCE:  Plan: Radha LONGORIA  Coverage: AETNA  Effective Date: 2009  Group Number: [unfilled]  Subscriber Number: [id number] - (Commercial)    Payer/Plan Subscr  Sex Relation Sub. Ins. ID Effective Group Num   1. Neomia Moment 1980 Male Self [id number]                                   [address]       Patient Wound Information:      Problem List Items Addressed This Visit          Other    Pressure injury of right heel, stage 3 ([address] [facility]) - Primary    Relevant Orders    Initiate Outpatient Wound Care Protocol    DME Order for Orthosis as OP    Pressure injury of sacral region, stage 3 [facility])    Relevant Orders    Initiate Outpatient Wound Care Protocol       WOUNDS REQUIRING DRESSING SUPPLIES:     Wound 23 Sacrum #1 (Active)   Wound Image   23 1320   Wound Etiology Pressure Stage 3 23 1320   Dressing Status Dry;Clean; Intact 23 1320   Wound Cleansed Cleansed with saline 23 1320   Wound Length (cm) 1.5 cm 23 1320   Wound Width (cm) 2 cm 23 1320   Wound Depth (cm) 0.2 cm 23 1320   Wound Surface Area (cm^2) 3 cm^2 23 1320   Wound Volume (cm^3) 0.6 cm^3 23 1320   Post-Procedure Length (cm) 1.5 cm 23 1349   Post-Procedure Width (cm) 2 cm 23 1349   Post-Procedure Depth (cm) 0.2 cm 23   Post-Procedure Surface Area (cm^2) 3 cm^2 23

## 2023-08-28 NOTE — WOUND CARE
[address] [facility] [place]   Medical Staff Progress Note     Tanya Haque  MEDICAL RECORD NUMBER:  [id number]  AGE: 36 y.o. GENDER: male  : 1980  EPISODE DATE:  2023    Chief complaint and reason for visit:   Sacral and right heel pressure injury  Chief Complaint   Patient presents with    Wound Check     Right heel, sacral      Patient presenting for follow up evaluation of wound(s) per chief complaint. Subjective: Symptoms, wound related issues, or other pertinent wound history since last visit: 36year old quadriplegic presents with pressure injury of right heel and sacrum. Wound 23 Sacrum #1 (Active)   Wound Image   23 1320   Wound Etiology Pressure Stage 3 23 132   Dressing Status Dry;Clean; Intact 23 132   Wound Cleansed Cleansed with saline 23 1320   Wound Length (cm) 1.5 cm 23 1320   Wound Width (cm) 2 cm 23 1320   Wound Depth (cm) 0.2 cm 23 1320   Wound Surface Area (cm^2) 3 cm^2 23 1320   Wound Volume (cm^3) 0.6 cm^3 23 1320   Post-Procedure Length (cm) 1.5 cm 23 1349   Post-Procedure Width (cm) 2 cm 23 1349   Post-Procedure Depth (cm) 0.2 cm 23 1349   Post-Procedure Surface Area (cm^2) 3 cm^2 23 1349   Post-Procedure Volume (cm^3) 0.6 cm^3 23 1349   Wound Assessment Slough;Granulation tissue 23 1320   Drainage Amount Moderate (25-50%) 23 132   Drainage Description Serosanguinous 23 1320   Odor None 23 132   Colette-wound Assessment Hypopigmented; Intact 23 132   Margins Attached edges 23 1320   Number of days: 0       Wound 23 Heel Right #2 (Active)   Wound Image   23 1322   Wound Etiology Pressure Stage 3 23 1322   Dressing Status Intact; Clean 23 1322   Wound Cleansed Cleansed with saline 23 1322   Wound Length (cm) 2.5 cm 23 1322   Wound Width (cm) 2.2 cm 23

## 2023-09-19 ENCOUNTER — HOSPITAL ENCOUNTER (OUTPATIENT)
Facility: HOSPITAL | Age: 43
Discharge: HOME | End: 2023-09-19
Payer: COMMERCIAL

## 2023-09-19 VITALS
TEMPERATURE: 98.7 F | DIASTOLIC BLOOD PRESSURE: 75 MMHG | SYSTOLIC BLOOD PRESSURE: 108 MMHG | HEART RATE: 69 BPM | RESPIRATION RATE: 18 BRPM

## 2023-09-19 PROCEDURE — 99214 OFFICE O/P EST MOD 30 MIN: CPT

## 2023-09-19 NOTE — WOUND CARE
[address] [facility] [facility] [place]   Medical Staff Progress Note     January Umana  MEDICAL RECORD NUMBER:  [id number]  AGE: 36 y.o. GENDER: male  : 1980  EPISODE DATE:  2023    Chief complaint and reason for visit:   R heel, sacrum  Chief Complaint   Patient presents with    Wound Check     R heel and sacrum      Patient presenting for follow up evaluation of wound(s) per chief complaint. Subjective: Symptoms, wound related issues, or other pertinent wound history since last visit: no new problems reported     Wound 23 Sacrum #1 (Active)   Wound Image   23 1340   Wound Etiology Pressure Stage 3 23 1340   Dressing Status Dry;Clean; Intact 23 1340   Wound Cleansed Cleansed with saline 23 1340   Wound Length (cm) 1 cm 23 1340   Wound Width (cm) 1 cm 23 1340   Wound Depth (cm) 0.2 cm 23 1340   Wound Surface Area (cm^2) 1 cm^2 23 1340   Change in Wound Size % (l*w) 66.67 23 1340   Wound Volume (cm^3) 0.2 cm^3 23 1340   Wound Healing % 67 23 1340   Post-Procedure Length (cm) 1.5 cm 23 1349   Post-Procedure Width (cm) 2 cm 23 1349   Post-Procedure Depth (cm) 0.2 cm 23 1349   Post-Procedure Surface Area (cm^2) 3 cm^2 23 1349   Post-Procedure Volume (cm^3) 0.6 cm^3 23 1349   Undermining Starts ___ O'Clock 7 23 1340   Undermining Ends___ O'Clock 11 23 1340   Undermining Maxium Distance (cm) 0.5 23 1340   Wound Assessment Granulation tissue 23 1340   Drainage Amount Moderate (25-50%) 23 1340   Drainage Description Serosanguinous 23 1340   Odor None 23 1340   Colette-wound Assessment Maceration; Hypopigmented 23 1340   Margins Unattached edges 23 1340   Number of days: 22       Wound 23 Heel Right #2 (Active)   Wound Image   23 1340   Wound Etiology Pressure Stage 3 23 1340   Dressing Status

## 2023-09-19 NOTE — WOUND CARE
Wound Clinic Physician Orders and Discharge Instructions  [address] [place] [address] S. [address]  Telephone: [phone]    NAME:  Federico Wilkerson  YOB: 1980  MEDICAL RECORD NUMBER:  [id number]  DATE:  9/19/2023      Return Appointment:  [x] Dressing Supply Provider: Ramy  [] Home Healthcare:  [x] Return Appointment:  1 Week(s)    [] Nurse Visit:     [] Discharge from [facility]: [] Healed        [] Refer to Provider:         [] Consult    Follow-up Information:  [] Ordered Tests:   [] Referrals:   [] Rx:   [] Other:       Wound Cleansing:   Do not scrub or use excessive force. Cleanse wound prior to applying a clean dressing with:  [x] Normal Saline or   [] Keep Wound Dry in Shower     [] Wound Cleanser   [x] Cleanse wound with Mild Soap & Water    [] Other:       Topical Treatments:  Do not apply lotions, creams, or ointments to wound bed unless directed. [] Apply moisturizing lotion to skin surrounding the wound prior to dressing change.  [] Apply antifungal ointment to skin surrounding the wound prior to dressing change.  [] Apply thin film of moisture barrier ointment to skin immediately around wound.   [] Apply Betadine to skin immediately around wound   [] Other:      Dressings:           Wound Location Sacrum, R Heel    [x] Apply Primary Dressing:       [] MediHoney Gel [] MediHoney Alginate  [] Calcium Alginate with Silver   [] Calcium Alginate without silver   [] Collagen with silver [] Collagen without Silver    [] Santyl with moist saline gauze     [x] Hydrofera Blue (cut to size and moistened with normal saline)  [] Hydrofera Blue Ready (cut to size)      [] Normal Saline wet to dry  [] Betadine wet to dry    [] Hydrogel  [] Mepitel     [] Bactroban/Mupirocin   [] Iodoform Packing Strip [] Plain Packing Strip   [] Skin Sub:   [] Other:      [x] Cover and Secure with:     [x] Gauze [x] Viraj [] Kerlix   [x] Foam to

## 2023-09-19 NOTE — DISCHARGE INSTRUCTIONS
Romy Botello RN  Registered Nurse  Wound Care      Signed  Date of Service:  9/19/2023 11:15 AM     Signed                                                                                               Wound Clinic Physician Orders and Discharge Instructions  [address] [place] [address]. [address] [place] [address]  Telephone: [phone]     NAME:  Precious Watters  YOB: 1980  MEDICAL RECORD NUMBER:  [id number]  DATE:  9/19/2023        Return Appointment:  [x] Dressing Supply Provider: Ramy  [] Home Healthcare:  [x] Return Appointment:  1 Week(s)    [] Nurse Visit:      [] Discharge from [facility]: [] Healed        [] Refer to Provider:         [] Consult     Follow-up Information:  [] Ordered Tests:   [] Referrals:   [] Rx:   [] Other:         Wound Cleansing:   Do not scrub or use excessive force. Cleanse wound prior to applying a clean dressing with:  [x] Normal Saline or      [] Keep Wound Dry in Shower     [] Wound Cleanser   [x] Cleanse wound with Mild Soap & Water    [] Other:        Topical Treatments:  Do not apply lotions, creams, or ointments to wound bed unless directed. [] Apply moisturizing lotion to skin surrounding the wound prior to dressing change.  [] Apply antifungal ointment to skin surrounding the wound prior to dressing change.  [] Apply thin film of moisture barrier ointment to skin immediately around wound.   [] Apply Betadine to skin immediately around wound   [] Other:                 Dressings:                  Wound Location Sacrum, R Heel     [x] Apply Primary Dressing:                                          [] MediHoney Gel      [] MediHoney Alginate  [] Calcium Alginate with Silver   [] Calcium Alginate without silver              [] Collagen with silver            [] Collagen without Silver    [] Santyl with moist saline gauze                [x] Hydrofera Blue (cut to size and moistened with normal saline)

## 2023-09-19 NOTE — WOUND CARE
[address]:     [address] f: [phone] f: [phone] p: [phone] [email or web address]      Ordering Center:     [address] [place] [address] Way    Phone: [phone]  Fax: [phone]    Patient Information:      Shira Kaplan  [address] [facility] [address] [id number] [phone]   : 1980  AGE: 36 y.o. GENDER: male   EPISODE DATE: 2023    Insurance:      PRIMARY INSURANCE:  Plan: Pema Anna Marie NAP  Coverage: AETNA  Effective Date: 2009  Group Number: [unfilled]  Subscriber Number: [id number] - (Commercial)    Payer/Plan Subscr  Sex Relation Sub. Ins. ID Effective Group Num   1. Eliana Risk 1980 Male Self [id number]                                   [address]       Patient Wound Information:      Problem List Items Addressed This Visit    None      WOUNDS REQUIRING DRESSING SUPPLIES:     Wound 23 Sacrum #1 (Active)   Wound Image   23 1340   Wound Etiology Pressure Stage 3 23 1340   Dressing Status Dry;Clean; Intact 23 1340   Wound Cleansed Cleansed with saline 23 1340   Wound Length (cm) 1 cm 23 1340   Wound Width (cm) 1 cm 23 1340   Wound Depth (cm) 0.2 cm 23 1340   Wound Surface Area (cm^2) 1 cm^2 23 1340   Change in Wound Size % (l*w) 66.67 23 1340   Wound Volume (cm^3) 0.2 cm^3 23 1340   Wound Healing % 67 23 1340   Post-Procedure Length (cm) 1.5 cm 23 1349   Post-Procedure Width (cm) 2 cm 23 1349   Post-Procedure Depth (cm) 0.2 cm 23 134   Post-Procedure Surface Area (cm^2) 3 cm^2 23 1349   Post-Procedure Volume (cm^3) 0.6 cm^3 23 1349   Undermining Starts ___ O'Clock 7 23 1340   Undermining Ends___ O'Clock 11 23 1340   Undermining Maxium Distance (cm) 0.5 23 134

## 2023-09-25 ENCOUNTER — HOSPITAL ENCOUNTER (OUTPATIENT)
Facility: HOSPITAL | Age: 43
Discharge: HOME | End: 2023-09-25
Payer: COMMERCIAL

## 2023-09-25 VITALS
SYSTOLIC BLOOD PRESSURE: 139 MMHG | RESPIRATION RATE: 18 BRPM | HEART RATE: 92 BPM | DIASTOLIC BLOOD PRESSURE: 100 MMHG | TEMPERATURE: 99.5 F

## 2023-09-25 DIAGNOSIS — L89.613 PRESSURE INJURY OF RIGHT HEEL, STAGE 3 (HCC): Primary | ICD-10-CM

## 2023-09-25 DIAGNOSIS — L89.153 PRESSURE INJURY OF SACRAL REGION, STAGE 3 (HCC): ICD-10-CM

## 2023-09-25 PROCEDURE — 17250 CHEM CAUT OF GRANLTJ TISSUE: CPT

## 2023-09-25 RX ORDER — BETAMETHASONE DIPROPIONATE 0.05 %
OINTMENT (GRAM) TOPICAL ONCE
OUTPATIENT
Start: 2023-09-25 | End: 2023-09-25

## 2023-09-25 RX ORDER — TRIAMCINOLONE ACETONIDE 1 MG/G
OINTMENT TOPICAL ONCE
OUTPATIENT
Start: 2023-09-25 | End: 2023-09-25

## 2023-09-25 RX ORDER — LIDOCAINE HYDROCHLORIDE 40 MG/ML
SOLUTION TOPICAL ONCE
OUTPATIENT
Start: 2023-09-25 | End: 2023-09-25

## 2023-09-25 RX ORDER — SODIUM CHLOR/HYPOCHLOROUS ACID 0.033 %
SOLUTION, IRRIGATION IRRIGATION ONCE
OUTPATIENT
Start: 2023-09-25 | End: 2023-09-25

## 2023-09-25 RX ORDER — GENTAMICIN SULFATE 1 MG/G
OINTMENT TOPICAL ONCE
OUTPATIENT
Start: 2023-09-25 | End: 2023-09-25

## 2023-09-25 RX ORDER — LIDOCAINE HYDROCHLORIDE 20 MG/ML
JELLY TOPICAL ONCE
OUTPATIENT
Start: 2023-09-25 | End: 2023-09-25

## 2023-09-25 RX ORDER — LIDOCAINE 50 MG/G
OINTMENT TOPICAL ONCE
OUTPATIENT
Start: 2023-09-25 | End: 2023-09-25

## 2023-09-25 RX ORDER — CLOBETASOL PROPIONATE 0.5 MG/G
OINTMENT TOPICAL ONCE
OUTPATIENT
Start: 2023-09-25 | End: 2023-09-25

## 2023-09-25 RX ORDER — BACITRACIN ZINC AND POLYMYXIN B SULFATE 500; 1000 [USP'U]/G; [USP'U]/G
OINTMENT TOPICAL ONCE
OUTPATIENT
Start: 2023-09-25 | End: 2023-09-25

## 2023-09-25 RX ORDER — GINSENG 100 MG
CAPSULE ORAL ONCE
OUTPATIENT
Start: 2023-09-25 | End: 2023-09-25

## 2023-09-25 RX ORDER — LIDOCAINE 40 MG/G
CREAM TOPICAL ONCE
OUTPATIENT
Start: 2023-09-25 | End: 2023-09-25

## 2023-09-25 RX ORDER — IBUPROFEN 200 MG
TABLET ORAL ONCE
OUTPATIENT
Start: 2023-09-25 | End: 2023-09-25

## 2023-09-25 NOTE — DISCHARGE INSTRUCTIONS
Wound Clinic Physician Orders and Discharge Instructions  [address] [place] [address] S. [address]  Telephone: [phone]    NAME:  Ashely Garrett  YOB: 1980  MEDICAL RECORD NUMBER:  [id number]  DATE:  9/25/2023      Return Appointment:  [] Dressing Supply Provider: Rosa Elena Stephenson  [] Home Healthcare:  [x] Return Appointment:  2 Week(s)    [] Nurse Visit:     [] Discharge from [facility]: [] Healed        [] Refer to Provider:         [] Consult    Follow-up Information:  [] Ordered Tests:   [] Referrals:   [] Rx:   [] Other:       Wound Cleansing:   Do not scrub or use excessive force. Cleanse wound prior to applying a clean dressing with:  [x] Normal Saline    [] Keep Wound Dry in Shower     [] Wound Cleanser   [] Cleanse wound with Mild Soap & Water    [] Other:       Topical Treatments:  Do not apply lotions, creams, or ointments to wound bed unless directed. [] Apply moisturizing lotion to skin surrounding the wound prior to dressing change.  [] Apply antifungal ointment to skin surrounding the wound prior to dressing change.  [] Apply thin film of moisture barrier ointment to skin immediately around wound.   [] Apply Betadine to skin immediately around wound   [] Other:      Dressings:           Wound Location Sacrum  [x] Apply Primary Dressing:       [x] MediHoney Gel [] MediHoney Alginate  [] Calcium Alginate with Silver   [] Calcium Alginate without silver   [] Collagen with silver [] Collagen without Silver    [] Santyl with moist saline gauze     [] Hydrofera Blue (cut to size and moistened with normal saline)  [] Hydrofera Blue Ready (cut to size)      [] Normal Saline wet to dry  [] Betadine wet to dry    [] Hydrogel  [] Mepitel     [] Bactroban/Mupirocin   [] Iodoform Packing Strip [] Plain Packing Strip   [] Skin Sub:   [] Other:      [x] Cover and Secure with:     [x] Gauze [] Viraj [] Kerlix   [x] Foam to sacrum [] Super

## 2023-09-25 NOTE — WOUND CARE
Wound Clinic Physician Orders and Discharge Instructions  [address] [place] [address] S. [address]  Telephone: [phone]    NAME:  Eleanor Islas  YOB: 1980  MEDICAL RECORD NUMBER:  [id number]  DATE:  9/25/2023      Return Appointment:  [] Dressing Supply Provider: Jesus Deutsch  [] Home Healthcare:  [x] Return Appointment:  2 Week(s)    [] Nurse Visit:     [] Discharge from [facility]: [] Healed        [] Refer to Provider:         [] Consult    Follow-up Information:  [] Ordered Tests:   [] Referrals:   [] Rx:   [] Other:       Wound Cleansing:   Do not scrub or use excessive force. Cleanse wound prior to applying a clean dressing with:  [x] Normal Saline    [] Keep Wound Dry in Shower     [] Wound Cleanser   [] Cleanse wound with Mild Soap & Water    [] Other:       Topical Treatments:  Do not apply lotions, creams, or ointments to wound bed unless directed. [] Apply moisturizing lotion to skin surrounding the wound prior to dressing change.  [] Apply antifungal ointment to skin surrounding the wound prior to dressing change.  [] Apply thin film of moisture barrier ointment to skin immediately around wound.   [] Apply Betadine to skin immediately around wound   [] Other:      Dressings:           Wound Location Sacrum  [x] Apply Primary Dressing:       [x] MediHoney Gel [] MediHoney Alginate  [] Calcium Alginate with Silver   [] Calcium Alginate without silver   [] Collagen with silver [] Collagen without Silver    [] Santyl with moist saline gauze     [] Hydrofera Blue (cut to size and moistened with normal saline)  [] Hydrofera Blue Ready (cut to size)      [] Normal Saline wet to dry  [] Betadine wet to dry    [] Hydrogel  [] Mepitel     [] Bactroban/Mupirocin   [] Iodoform Packing Strip [] Plain Packing Strip   [] Skin Sub:   [] Other:      [x] Cover and Secure with:     [x] Gauze [] Viraj [] Kerlix   [x] Foam to sacrum [] Super
contact the  [facility] [employer] at [phone]. Our hours are Monday - Friday 8am - 4:30pm, closed all major holidays. If you need help with your wound outside these hours and cannot wait until we are again available, contact your PCP or go to the hospital emergency room. PLEASE NOTE: IF YOU ARE UNABLE TO OBTAIN WOUND SUPPLIES, CONTINUE TO USE THE SUPPLIES YOU HAVE AVAILABLE UNTIL YOU ARE ABLE TO REACH US. IT IS MOST IMPORTANT TO KEEP THE WOUND COVERED AT ALL TIMES. Electronically signed by Vinh Zimmer MD on 9/25/2023 at 3:48 PM   Chemical Cautery  Performed by: Vinh Zimmer MD  Consent obtained: Yes  Time out taken: Yes  Tissue Type: Hypergranulation  Pain Control:     Method: silver nitrate    Location of Chemical Cautery:   Wound/Ulcer #1  Procedural Pain: 0  / 10   Post Procedural Pain: 0 / 10   Response to treatment: Patient tolerated procedure well with no complaints of pain.

## 2023-12-04 ENCOUNTER — HOSPITAL ENCOUNTER (OUTPATIENT)
Facility: HOSPITAL | Age: 43
Discharge: HOME | End: 2023-12-04
Attending: SPECIALIST
Payer: MEDICARE

## 2023-12-04 VITALS
TEMPERATURE: 95.5 F | DIASTOLIC BLOOD PRESSURE: 89 MMHG | HEART RATE: 56 BPM | SYSTOLIC BLOOD PRESSURE: 129 MMHG | RESPIRATION RATE: 18 BRPM

## 2023-12-04 DIAGNOSIS — L89.153 PRESSURE INJURY OF SACRAL REGION, STAGE 3 (HCC): ICD-10-CM

## 2023-12-04 DIAGNOSIS — L89.613 PRESSURE INJURY OF RIGHT HEEL, STAGE 3 (HCC): Primary | ICD-10-CM

## 2023-12-04 PROCEDURE — 11042 DBRDMT SUBQ TIS 1ST 20SQCM/<: CPT

## 2023-12-04 RX ORDER — LIDOCAINE 40 MG/G
CREAM TOPICAL ONCE
OUTPATIENT
Start: 2023-12-04 | End: 2023-12-04

## 2023-12-04 RX ORDER — LIDOCAINE HYDROCHLORIDE 20 MG/ML
JELLY TOPICAL ONCE
OUTPATIENT
Start: 2023-12-04 | End: 2023-12-04

## 2023-12-04 RX ORDER — LIDOCAINE HYDROCHLORIDE 40 MG/ML
SOLUTION TOPICAL ONCE
OUTPATIENT
Start: 2023-12-04 | End: 2023-12-04

## 2023-12-04 RX ORDER — IBUPROFEN 200 MG
TABLET ORAL ONCE
OUTPATIENT
Start: 2023-12-04 | End: 2023-12-04

## 2023-12-04 RX ORDER — TRIAMCINOLONE ACETONIDE 1 MG/G
OINTMENT TOPICAL ONCE
OUTPATIENT
Start: 2023-12-04 | End: 2023-12-04

## 2023-12-04 RX ORDER — SODIUM CHLOR/HYPOCHLOROUS ACID 0.033 %
SOLUTION, IRRIGATION IRRIGATION ONCE
OUTPATIENT
Start: 2023-12-04 | End: 2023-12-04

## 2023-12-04 RX ORDER — BETAMETHASONE DIPROPIONATE 0.05 %
OINTMENT (GRAM) TOPICAL ONCE
OUTPATIENT
Start: 2023-12-04 | End: 2023-12-04

## 2023-12-04 RX ORDER — CLOBETASOL PROPIONATE 0.5 MG/G
OINTMENT TOPICAL ONCE
OUTPATIENT
Start: 2023-12-04 | End: 2023-12-04

## 2023-12-04 RX ORDER — BACITRACIN ZINC AND POLYMYXIN B SULFATE 500; 1000 [USP'U]/G; [USP'U]/G
OINTMENT TOPICAL ONCE
OUTPATIENT
Start: 2023-12-04 | End: 2023-12-04

## 2023-12-04 RX ORDER — LIDOCAINE 50 MG/G
OINTMENT TOPICAL ONCE
OUTPATIENT
Start: 2023-12-04 | End: 2023-12-04

## 2023-12-04 RX ORDER — GENTAMICIN SULFATE 1 MG/G
OINTMENT TOPICAL ONCE
OUTPATIENT
Start: 2023-12-04 | End: 2023-12-04

## 2023-12-04 RX ORDER — GINSENG 100 MG
CAPSULE ORAL ONCE
OUTPATIENT
Start: 2023-12-04 | End: 2023-12-04

## 2023-12-04 NOTE — WOUND CARE
Wound Clinic Physician Orders and Discharge Instructions  [address] [place] [address] S. [address], [address] [place] [address] Way  Telephone: [phone]    NAME:  Alessio Guthrie  YOB: 1980  MEDICAL RECORD NUMBER:  [id number]  DATE:  12/4/2023      Return Appointment:  [] Dressing Supply Provider: Mery Sepulveda  [] Home Healthcare:  [x] Return Appointment:  1 Week(s)    [] Nurse Visit:     [] Discharge from [facility]: [] Healed        [] Refer to Provider:         [] Consult    Follow-up Information:  [] Ordered Tests:   [] Referrals:   [] Rx:   [] Other:       Wound Cleansing:   Do not scrub or use excessive force. Cleanse wound prior to applying a clean dressing with:  [x] Normal Saline    [] Keep Wound Dry in Shower     [] Wound Cleanser   [] Cleanse wound with Mild Soap & Water    [] Other:       Topical Treatments:  Do not apply lotions, creams, or ointments to wound bed unless directed. [] Apply moisturizing lotion to skin surrounding the wound prior to dressing change.  [] Apply antifungal ointment to skin surrounding the wound prior to dressing change.  [] Apply thin film of moisture barrier ointment to skin immediately around wound.   [] Apply Betadine to skin immediately around wound   [] Other:      Dressings:           Wound Location Sacrum  [x] Apply Primary Dressing:       [] MediHoney Gel [] MediHoney Alginate  [] Calcium Alginate with Silver   [] Calcium Alginate without silver   [] Collagen with silver [] Collagen without Silver    [] Santyl with moist saline gauze     [x] Hydrofera Blue (cut to size and moistened with normal saline)  [] Hydrofera Blue Ready (cut to size)      [] Normal Saline wet to dry  [] Betadine wet to dry    [] Hydrogel  [] Mepitel     [] Bactroban/Mupirocin   [] Iodoform Packing Strip [] Plain Packing Strip   [] Skin Sub:   [] Other:      [x] Cover and Secure with:     [x] Gauze [] Viraj [] Kerlix   [x] Foam to sacrum [] Super
12/04/23 1445   Undermining Starts ___ O'Clock 7 09/19/23 1340   Undermining Ends___ O'Clock 11 09/19/23 1340   Undermining Maxium Distance (cm) 0.5 09/19/23 1340   Wound Assessment Slough;Granulation tissue 12/04/23 1434   Drainage Amount Moderate (25-50%) 12/04/23 1434   Drainage Description Serosanguinous 12/04/23 1434   Odor None 12/04/23 1434   Colette-wound Assessment Maceration; Hypopigmented 12/04/23 1434   Margins Attached edges 12/04/23 1434   Wound Thickness Description not for Pressure Injury Full thickness 09/25/23 1421   Number of days: 98        Total Surface Area Debrided:  7.04 sq cm   Estimated Blood Loss: Minimal amount blood loss . Hemostasis Achieved:  by pressure  Procedural Pain: 0  / 10   Post Procedural Pain: 0 / 10   Response to treatment: Patient tolerated procedure well with no complaints of pain.

## 2023-12-04 NOTE — DISCHARGE INSTRUCTIONS
Wound Clinic Physician Orders and Discharge Instructions  [address] [place], [place] [address] S. [address]  Telephone: [phone]    NAME:  Saroj Cox  YOB: 1980  MEDICAL RECORD NUMBER:  [id number]  DATE:  12/4/2023      Return Appointment:  [] Dressing Supply Provider: Cheryl Grimm  [] Home Healthcare:  [x] Return Appointment:  1 Week(s)    [] Nurse Visit:     [] Discharge from [facility]: [] Healed        [] Refer to Provider:         [] Consult    Follow-up Information:  [] Ordered Tests:   [] Referrals:   [] Rx:   [] Other:       Wound Cleansing:   Do not scrub or use excessive force. Cleanse wound prior to applying a clean dressing with:  [x] Normal Saline    [] Keep Wound Dry in Shower     [] Wound Cleanser   [] Cleanse wound with Mild Soap & Water    [] Other:       Topical Treatments:  Do not apply lotions, creams, or ointments to wound bed unless directed. [] Apply moisturizing lotion to skin surrounding the wound prior to dressing change.  [] Apply antifungal ointment to skin surrounding the wound prior to dressing change.  [] Apply thin film of moisture barrier ointment to skin immediately around wound.   [] Apply Betadine to skin immediately around wound   [] Other:      Dressings:           Wound Location Sacrum  [x] Apply Primary Dressing:       [] MediHoney Gel [] MediHoney Alginate  [] Calcium Alginate with Silver   [] Calcium Alginate without silver   [] Collagen with silver [] Collagen without Silver    [] Santyl with moist saline gauze     [x] Hydrofera Blue (cut to size and moistened with normal saline)  [] Hydrofera Blue Ready (cut to size)      [] Normal Saline wet to dry  [] Betadine wet to dry    [] Hydrogel  [] Mepitel     [] Bactroban/Mupirocin   [] Iodoform Packing Strip [] Plain Packing Strip   [] Skin Sub:   [] Other:      [x] Cover and Secure with:     [x] Gauze [] Viraj [] Kerlix   [x] Foam to sacrum [] Super

## 2024-05-16 ENCOUNTER — TELEPHONE (OUTPATIENT)
Age: 44
End: 2024-05-16

## 2024-05-16 NOTE — TELEPHONE ENCOUNTER
Called pt,verified pt with two pt identifiers, advised pt we have received a referral for a sooner appt needed for pt. Scheduled pt for 6/7/24 at 3:30 as pt needs an afternoon appt since he relies on nurse to get him up and dressed. Advised pt I will mail out his appt reminder with address on it. Gave him address verbally on phone but hard for pt to write down since he is a quadriplegic. Pt thanked me for call and verbalized understanding of appt.        Put appt reminder in mail to pt.

## 2024-06-07 ENCOUNTER — OFFICE VISIT (OUTPATIENT)
Age: 44
End: 2024-06-07
Payer: MEDICARE

## 2024-06-07 VITALS
HEIGHT: 71 IN | OXYGEN SATURATION: 99 % | WEIGHT: 160 LBS | BODY MASS INDEX: 22.4 KG/M2 | RESPIRATION RATE: 16 BRPM | SYSTOLIC BLOOD PRESSURE: 118 MMHG | DIASTOLIC BLOOD PRESSURE: 88 MMHG | HEART RATE: 89 BPM

## 2024-06-07 DIAGNOSIS — S14.115A SPINAL CORD INJURY AT C5-C7 LEVEL WITH COMPLETE SPINAL CORD LESION (HCC): ICD-10-CM

## 2024-06-07 DIAGNOSIS — G82.20 BILATERAL PARAPARESIS (HCC): ICD-10-CM

## 2024-06-07 DIAGNOSIS — R25.2 SPASTICITY: Primary | ICD-10-CM

## 2024-06-07 PROCEDURE — 3079F DIAST BP 80-89 MM HG: CPT | Performed by: PSYCHIATRY & NEUROLOGY

## 2024-06-07 PROCEDURE — G8420 CALC BMI NORM PARAMETERS: HCPCS | Performed by: PSYCHIATRY & NEUROLOGY

## 2024-06-07 PROCEDURE — 4004F PT TOBACCO SCREEN RCVD TLK: CPT | Performed by: PSYCHIATRY & NEUROLOGY

## 2024-06-07 PROCEDURE — 3074F SYST BP LT 130 MM HG: CPT | Performed by: PSYCHIATRY & NEUROLOGY

## 2024-06-07 PROCEDURE — 99205 OFFICE O/P NEW HI 60 MIN: CPT | Performed by: PSYCHIATRY & NEUROLOGY

## 2024-06-07 PROCEDURE — G8427 DOCREV CUR MEDS BY ELIG CLIN: HCPCS | Performed by: PSYCHIATRY & NEUROLOGY

## 2024-06-07 RX ORDER — PROCHLORPERAZINE 25 MG/1
SUPPOSITORY RECTAL
COMMUNITY
Start: 2024-05-18

## 2024-06-07 ASSESSMENT — PATIENT HEALTH QUESTIONNAIRE - PHQ9
SUM OF ALL RESPONSES TO PHQ QUESTIONS 1-9: 0
SUM OF ALL RESPONSES TO PHQ9 QUESTIONS 1 & 2: 0
SUM OF ALL RESPONSES TO PHQ QUESTIONS 1-9: 0
1. LITTLE INTEREST OR PLEASURE IN DOING THINGS: NOT AT ALL
2. FEELING DOWN, DEPRESSED OR HOPELESS: NOT AT ALL

## 2024-06-07 NOTE — PROGRESS NOTES
1. Have you been to the ER, urgent care clinic since your last visit?  Hospitalized since your last visit?  No.    2. Have you seen or consulted any other health care providers outside of the [facility] since your last visit?  Include any pap smears or colon screening.   No.        Chief Complaint   Patient presents with    New Patient     Spasticity        
medications    Medication Sig Start Date End Date Taking? Authorizing Provider   dantrolene (DANTRIUM) 25 MG capsule Take by mouth 4 times daily    Automatic Reconciliation, Ar   diazePAM (VALIUM) 10 MG tablet Take by mouth every 8 hours as needed.    Automatic Reconciliation, Ar   insulin glargine (LANTUS) 100 UNIT/ML injection vial Inject into the skin    Automatic Reconciliation, Ar   insulin lispro prot & lispro (HUMALOG 50/50) (50-50) 100 UNIT per ML SUSP injection Inject into the skin    Automatic Reconciliation, Ar   ondansetron (ZOFRAN-ODT) 4 MG disintegrating tablet Take by mouth every 8 hours as needed 1/25/19   Automatic Reconciliation, Ar   traZODone (DESYREL) 50 MG tablet Take by mouth    Automatic Reconciliation, Ar       Review of Systems:    General, constitutional: negative  Eyes, vision: negative  Ears, nose, throat: negative  Cardiovascular, heart: negative  Respiratory: negative  Gastrointestinal: negative  Genitourinary: negative  Musculoskeletal: negative  Skin and integumentary: negative  Psychiatric: negative  Endocrine: negative  Neurological: negative, except for HPI  Hematologic/lymphatic: negative  Allergy/immunology: negative        Objective:     Ht 1.803 m (5' 11\")   Wt 72.6 kg (160 lb) Comment: unable to weigh- pt reported  BMI 22.32 kg/m²     Physical Exam:      General:  appears well nourished in no acute distress  Neck: no carotid bruits  Lungs: clear to auscultation  Heart:  no murmurs, regular rate  Lower extremity: peripheral pulses palpable and no edema  Skin: intact    Neurological exam:    Awake, alert, oriented to person, place and time  Recent and remote memory were normal  Attention and concentration were intact  Language was intact.  There was no aphasia  Speech: no dysarthria  Fund of knowledge was preserved    Cranial nerves:   II-XII were tested    Perrrla  Visual fields were full  Eomi, no evidence of nystagmus  Facial sensation:  normal and symmetric  Facial motor:

## 2024-06-10 ENCOUNTER — HOSPITAL ENCOUNTER (EMERGENCY)
Facility: HOSPITAL | Age: 44
Discharge: HOME | End: 2024-06-10
Attending: EMERGENCY MEDICINE
Payer: MEDICARE

## 2024-06-10 VITALS
HEIGHT: 71 IN | RESPIRATION RATE: 18 BRPM | BODY MASS INDEX: 22.4 KG/M2 | SYSTOLIC BLOOD PRESSURE: 129 MMHG | WEIGHT: 160 LBS | OXYGEN SATURATION: 99 % | TEMPERATURE: 99 F | DIASTOLIC BLOOD PRESSURE: 97 MMHG | HEART RATE: 107 BPM

## 2024-06-10 DIAGNOSIS — L89.619 PRESSURE INJURY OF SKIN OF RIGHT HEEL, UNSPECIFIED INJURY STAGE: ICD-10-CM

## 2024-06-10 DIAGNOSIS — L89.229 PRESSURE INJURY OF SKIN OF LEFT HIP, UNSPECIFIED INJURY STAGE: Primary | ICD-10-CM

## 2024-06-10 PROCEDURE — 99283 EMERGENCY DEPT VISIT LOW MDM: CPT
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
## 2024-06-12 ENCOUNTER — TELEPHONE (OUTPATIENT)
Age: 44
End: 2024-06-12

## 2024-06-12 NOTE — TELEPHONE ENCOUNTER
Botox Drug Acquisition: BUY AND BILL  Drug: BOTOX 1 vial, 3 each: 300 units Dx: G82.20; S14.115A; R25.2  Insurance: [employer] Commercial  Submission Type: Availity/ [employer]  [facility]:   Authorization #: [id number]   Approval Range: 0612/24 to 06/12/25    Scanned approval letter to media

## 2024-06-14 ENCOUNTER — HOSPITAL ENCOUNTER (OUTPATIENT)
Facility: HOSPITAL | Age: 44
Discharge: HOME | End: 2024-06-14
Attending: STUDENT IN AN ORGANIZED HEALTH CARE EDUCATION/TRAINING PROGRAM
Payer: COMMERCIAL

## 2024-06-14 VITALS
TEMPERATURE: 97.2 F | HEART RATE: 86 BPM | DIASTOLIC BLOOD PRESSURE: 113 MMHG | SYSTOLIC BLOOD PRESSURE: 166 MMHG | RESPIRATION RATE: 18 BRPM

## 2024-06-14 DIAGNOSIS — E10.65 TYPE 1 DIABETES MELLITUS WITH HYPERGLYCEMIA (HCC): ICD-10-CM

## 2024-06-14 DIAGNOSIS — L89.613 PRESSURE INJURY OF RIGHT HEEL, STAGE 3 (HCC): Primary | ICD-10-CM

## 2024-06-14 DIAGNOSIS — G82.20 PARAPLEGIA (HCC): ICD-10-CM

## 2024-06-14 DIAGNOSIS — L89.150 PRESSURE INJURY OF SACRAL REGION, UNSTAGEABLE (HCC): ICD-10-CM

## 2024-06-14 PROCEDURE — 11042 DBRDMT SUBQ TIS 1ST 20SQCM/<: CPT

## 2024-06-14 PROCEDURE — 11045 DBRDMT SUBQ TISS EACH ADDL: CPT

## 2024-06-14 PROCEDURE — 99214 OFFICE O/P EST MOD 30 MIN: CPT

## 2024-06-14 NOTE — WOUND CARE
Info faxed to [facility] to initiate HH  
(25-50%) 06/14/24 1320   Drainage Description Serosanguinous 06/14/24 1320   Odor None 06/14/24 1320   Colette-wound Assessment Maceration 06/14/24 1320   Margins Undefined edges 06/14/24 1320   Number of days: 0       Wound 06/14/24 Buttocks Left #2 (Active)   Wound Image   06/14/24 1320   Wound Etiology Pressure Unstageable 06/14/24 1320   Dressing Status Other (Comment) 06/14/24 1320   Wound Cleansed Cleansed with saline 06/14/24 1320   Wound Length (cm) 6 cm 06/14/24 1320   Wound Width (cm) 3.5 cm 06/14/24 1320   Wound Depth (cm) 0.1 cm 06/14/24 1320   Wound Surface Area (cm^2) 21 cm^2 06/14/24 1320   Wound Volume (cm^3) 2.1 cm^3 06/14/24 1320   Post-Procedure Length (cm) 6.1 cm 06/14/24 1354   Post-Procedure Width (cm) 3.6 cm 06/14/24 1354   Post-Procedure Depth (cm) 0.2 cm 06/14/24 1354   Post-Procedure Surface Area (cm^2) 21.96 cm^2 06/14/24 1354   Post-Procedure Volume (cm^3) 4.392 cm^3 06/14/24 1354   Wound Assessment Slough;Eschar moist 06/14/24 1320   Drainage Amount Moderate (25-50%) 06/14/24 1320   Drainage Description Serosanguinous 06/14/24 1320   Odor None 06/14/24 1320   Colette-wound Assessment Intact 06/14/24 1320   Margins Attached edges 06/14/24 1320   Number of days: 0          Supplies Requested :      WOUND #: 1   PRIMARY DRESSING:  Hydrofera Blue ready    Cover and Secure with: 2X2 gauze pad  Other 4x4 zeutvit foam border     FREQUENCY OF DRESSING CHANGES:  Every other day       WOUND #: 2   PRIMARY DRESSING:  Hydrofera Blue ready    Cover and Secure with: 2X2 gauze pad  Other 4x4 zeutvit foam border      FREQUENCY OF DRESSING CHANGES:  Every other day         ADDITIONAL ITEMS:  [] Gloves:   [] Small [] Medium [x] Large  [] Tape 4\" Medipore  [x] Normal Saline  [x] Skin Prep   [] Adhesive Remover   [] Cotton/Foam Tip Applicators  [] Tongue Depressor   [] Other:    Patient Wound(s) Debrided: [x] Yes. Date last debrided  6/14/24   [] No    Debribement Type: Excisional/Sharp    Patient currently being 
Other:     Activity:  [x] Activity as tolerated:    [] Patient has no activity restrictions      [] Strict Bedrest   [] Remain off Work      [] May return to full duty work                                     [] Return to work with restrictions     Physician:  [] Dr. Karlene Albrecht   [] Dr. Scott Hart  [] Dr. Chandu Lange  [] Dr. Ferny Benavides  [] Dr. Mitchell Chavez  [] Dr.Silky Fishman  [x]   Nurse Case Manger:  Purvi      Wound Care Center Information: Should you experience any significant changes in your wound(s) or have questions about your wound care, please contact the Wound Center at [phone]. Our hours are Monday - Friday 8am - 4:30pm, closed all major holidays. If you need help with your wound outside these hours and cannot wait until we are again available, contact your PCP or go to the hospital emergency room.     PLEASE NOTE: IF YOU ARE UNABLE TO OBTAIN WOUND SUPPLIES, CONTINUE TO USE THE SUPPLIES YOU HAVE AVAILABLE UNTIL YOU ARE ABLE TO REACH US. IT IS MOST IMPORTANT TO KEEP THE WOUND COVERED AT ALL TIMES.

## 2024-06-14 NOTE — PROGRESS NOTES
disintegrating tablet Take by mouth every 8 hours as needed      traZODone (DESYREL) 50 MG tablet Take by mouth (Patient not taking: Reported on 6/7/2024)       No current facility-administered medications on file prior to encounter.         Written patient dismissal instructions given to patient and signed by patient or POA.         Electronically signed by Michelle Salmeron MD on 6/14/2024 at 2:54 PM

## 2024-06-14 NOTE — DISCHARGE INSTRUCTIONS
Wound Clinic Physician Orders and Discharge Instructions  [facility]  [address], [address]  [place] [address]  Telephone: [phone]     FAX [phone]    NAME:  Brooks Lazar  YOB: 1980  MEDICAL RECORD NUMBER:  [id number]  DATE:  6/14/2024      Return Appointment:  [] Dressing Supply Provider:   [x] Home Healthcare: Initiate Home [facility]   [x] Return Appointment:    1  Week(s)  [] Nurse Visit:     [] Discharge from [facility]:         [] Healed        [] Refer to Provider:         [] Consult    Follow-up Information:  [] Ordered Tests:   [] Referrals:   [] Rx:   [] Other:   [x] Other: purple mattress     Wound Cleansing:   Do not scrub or use excessive force.  Cleanse wound prior to applying a clean dressing with:  [x] Normal Saline   [x] Keep Wound Dry in Shower     [x] Wound Cleanser   [x] Cleanse wound with Mild Soap & Water   [] Other: Triamcinolone   [] Other:     Topical Treatments:  Do not apply lotions, creams, or ointments to wound bed unless directed.   [] Apply moisturizing lotion to skin surrounding the wound prior to dressing change.  [] Apply antifungal ointment to skin surrounding the wound prior to dressing change.  [] Apply thin film of moisture barrier ointment to skin immediately around wound.  [] Apply Betadine to skin immediately around wound   [] Other:                                                                                   Dressings:           Wound Location Right Heel    [x] Apply Primary Dressing:       [] MediHoney Gel                              [] MediHoney Alginate  [] Calcium Alginate with Silver               [] Calcium Alginate without silver   [] Collagen with silver                  [] Collagen without Silver    [] Santyl with moist saline gauze        [] Betadine wet to dry  [x] Hydrofera Blue Ready (cut to size)       [] Hydrofera Blue (cut to size and moistened with normal saline)      [] Normal Saline wet to

## 2024-06-18 ENCOUNTER — HOME HEALTH ADMISSION (OUTPATIENT)
Dept: HOME HEALTH SERVICES | Facility: HOME HEALTH | Age: 44
End: 2024-06-18
Payer: COMMERCIAL

## 2024-06-21 ENCOUNTER — HOSPITAL ENCOUNTER (OUTPATIENT)
Facility: HOSPITAL | Age: 44
Discharge: HOME | End: 2024-06-21
Attending: STUDENT IN AN ORGANIZED HEALTH CARE EDUCATION/TRAINING PROGRAM
Payer: COMMERCIAL

## 2024-06-21 VITALS — SYSTOLIC BLOOD PRESSURE: 82 MMHG | RESPIRATION RATE: 18 BRPM | TEMPERATURE: 99.5 F | DIASTOLIC BLOOD PRESSURE: 52 MMHG

## 2024-06-21 DIAGNOSIS — E10.65 TYPE 1 DIABETES MELLITUS WITH HYPERGLYCEMIA (HCC): Primary | ICD-10-CM

## 2024-06-21 DIAGNOSIS — L89.150 PRESSURE INJURY OF SACRAL REGION, UNSTAGEABLE (HCC): ICD-10-CM

## 2024-06-21 DIAGNOSIS — L89.613 PRESSURE INJURY OF RIGHT HEEL, STAGE 3 (HCC): ICD-10-CM

## 2024-06-21 PROCEDURE — 11043 DBRDMT MUSC&/FSCA 1ST 20/<: CPT

## 2024-06-21 PROCEDURE — 11042 DBRDMT SUBQ TIS 1ST 20SQCM/<: CPT

## 2024-06-21 RX ORDER — AMOXICILLIN 250 MG
1 CAPSULE ORAL DAILY
COMMUNITY

## 2024-06-21 RX ORDER — POLYETHYLENE GLYCOL 3350 17 G/17G
17 POWDER, FOR SOLUTION ORAL DAILY PRN
COMMUNITY

## 2024-06-21 RX ORDER — LEVOFLOXACIN 750 MG/1
750 TABLET, FILM COATED ORAL DAILY
COMMUNITY

## 2024-06-21 NOTE — WOUND CARE
Wound vac ordered and faxed   
Buttocks  [x] Pressure @ 125 mm/Hg  [x]Continuous     []Intermittent   [x] Black Foam  [x] White Foam to undermining  [x] Bridge  [x] Change dressing 3 times per week     [] Other:     Pressure Relief:  [x] When sitting, shift position or do seat lifts every 15 minutes.  [x] Wheelchair cushion [x] Specialty Bed/Mattress  [x] Turn every 2 hours when in bed.  Avoid direct pressure on wound site.  Limit side lying to 30 degree tilt.  Limit HOB elevation to 30 degrees.  [x] Other: Keep Pressure Off Wounds     Edema Control:  Apply: [] Compression Stocking:  mmHg  []Right Leg []Left Leg   [] Tubigrip []Right Leg Double Layer []Left Leg Double Layer      []Right Leg Single Layer []Left Leg Single Layer      [] Elevate leg(s) above the level of the heart when sitting.    [] Avoid prolonged standing in one place.     Compression:  Apply: [] Compression Wrap to []RightLeg []Left Leg   []  Coflex                [] Coflex Lite              [] Unna with Calamine Lite   [] Do not get leg(s) with wrap wet. If wrap becomes too tight, call the wound center and remove wrap from leg by unrolling each layer.   [] Elevate leg(s) above the level of the heart when sitting.    [] Avoid prolonged standing in one place.    Off-Loading:   [] Off-loading when: [] walking            [] in bed         [] Sitting                             [] Surgical shoe       [] Foam Boot(s)    [] Cast Boot            [] CROW Boot        [] Diabetic Shoes    [] Offloading heel boot       [] Wedge Shoe        [] Knee Scooter          [] Other:     [] Total non-weight bearing  [] Right Leg  [] Left Leg     [] Assistive Device [] Walker                [] Cane                 [] Wheelchair  [] Crutches                           [] Surgical shoe     [] Wedge Shoe     [] Foam Boot(s)       [] Knee Scooter                            [] Cast Boot         [] CROW Boot      [] Diabetic Shoes    [] Offloading heel boot       [] Other:     Dietary:  [] Diet as 
Information:      PROVIDER'S NAME: Dr. Michelle Salmeron    NPI:

## 2024-06-21 NOTE — DISCHARGE INSTRUCTIONS
Wound Clinic Physician Orders and Discharge Instructions  [facility]  [facility] [address]  [place] [address]  Telephone: [phone]     FAX [phone]    NAME:  Brooks Lazar  YOB: 1980  MEDICAL RECORD NUMBER:  [id number]  DATE:  6/21/2024      Return Appointment:  [] Dressing Supply Provider:   [x] Home Healthcare: [facility] Odell   [x] Return Appointment:    7/2 Sachi  then 7/12 with Dr Salmeron   Week(s)  [] Nurse Visit:     [] Discharge from [facility]:         [] Healed        [] Refer to Provider:         [] Consult    Follow-up Information:  [] Ordered Tests:   [] Referrals:   [] Rx:   [] Other:   [x] Other: purple mattress     Wound Cleansing:   Do not scrub or use excessive force.  Cleanse wound prior to applying a clean dressing with:  [x] Normal Saline   [x] Keep Wound Dry in Shower     [x] Wound Cleanser   [x] Cleanse wound with Mild Soap & Water   [] Other: Triamcinolone   [] Other:     Topical Treatments:  Do not apply lotions, creams, or ointments to wound bed unless directed.   [] Apply moisturizing lotion to skin surrounding the wound prior to dressing change.  [] Apply antifungal ointment to skin surrounding the wound prior to dressing change.  [] Apply thin film of moisture barrier ointment to skin immediately around wound.  [] Apply Betadine to skin immediately around wound   [] Other:                                                                                   Dressings:           Wound Location Right Heel    [x] Apply Primary Dressing:       [] MediHoney Gel                              [] MediHoney Alginate  [] Calcium Alginate with Silver               [] Calcium Alginate without silver   [] Collagen with silver                  [] Collagen without Silver    [] Santyl with moist saline gauze        [] Betadine wet to dry  [x] Hydrofera Blue Ready (cut to size)       [] Hydrofera Blue (cut to size and moistened with normal saline)

## 2024-06-25 ENCOUNTER — HOME CARE VISIT (OUTPATIENT)
Facility: HOME HEALTH | Age: 44
End: 2024-06-25
Payer: COMMERCIAL

## 2024-06-25 VITALS
OXYGEN SATURATION: 98 % | TEMPERATURE: 99 F | DIASTOLIC BLOOD PRESSURE: 80 MMHG | RESPIRATION RATE: 18 BRPM | SYSTOLIC BLOOD PRESSURE: 128 MMHG | HEART RATE: 95 BPM

## 2024-06-25 PROCEDURE — 0221000100 HH NO PAY CLAIM PROCEDURE

## 2024-06-25 PROCEDURE — G0299 HHS/HOSPICE OF RN EA 15 MIN: HCPCS

## 2024-06-26 ENCOUNTER — HOME CARE VISIT (OUTPATIENT)
Facility: HOME HEALTH | Age: 44
End: 2024-06-26
Payer: COMMERCIAL

## 2024-06-26 VITALS
TEMPERATURE: 99.1 F | SYSTOLIC BLOOD PRESSURE: 140 MMHG | HEART RATE: 85 BPM | RESPIRATION RATE: 18 BRPM | DIASTOLIC BLOOD PRESSURE: 88 MMHG | OXYGEN SATURATION: 99 %

## 2024-06-26 PROCEDURE — G0299 HHS/HOSPICE OF RN EA 15 MIN: HCPCS

## 2024-06-26 ASSESSMENT — ENCOUNTER SYMPTOMS
BOWEL INCONTINENCE: 1
STOOL DESCRIPTION: SOFT
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
## 2024-06-28 ENCOUNTER — HOME CARE VISIT (OUTPATIENT)
Facility: HOME HEALTH | Age: 44
End: 2024-06-28
Payer: COMMERCIAL

## 2024-06-28 VITALS
HEART RATE: 74 BPM | TEMPERATURE: 98.7 F | OXYGEN SATURATION: 100 % | SYSTOLIC BLOOD PRESSURE: 140 MMHG | RESPIRATION RATE: 18 BRPM | DIASTOLIC BLOOD PRESSURE: 88 MMHG

## 2024-06-28 PROCEDURE — G0299 HHS/HOSPICE OF RN EA 15 MIN: HCPCS

## 2024-06-29 ASSESSMENT — ENCOUNTER SYMPTOMS
BOWEL INCONTINENCE: 1
BOWEL INCONTINENCE: 1

## 2024-06-29 NOTE — HOME HEALTH
Subjective: urology changed my catheter Wednesday  Falls since last visit No(if yes complete the Fall Tracking Form and include bsrifallreport):   Caregiver involvement changes: no changes  Home health supplies by type and quantity ordered/delivered this visit include: supplies in the home    Clinician asked if patient has had any physician contact since last home care visit and patient states: YES  Clinician asked if patient has any new or changed medications and patient states:  NO   If Yes, were medications reconciled? N/A   Was the certifying physician notified of changes in medications? N/A     Clinical assessment (what this visit means for the patient overall and need for ongoing skilled care) and progress or lack of progress towards SPECIFIC goals: Patients mother present at todays visit. She lives close by and is willing and able to apply a wet to dry in the event that patients wound vac leaks and needs to be removed. Mom removed wound vac dressing, cleansed wound with NS and applied a wet to dry and covered with foam. Instructions were written down for her as well. Mother is competent in wet to dry dressings moving forward.   Patients wound vac was then reapplied. Wound shows no s/s of infection. Right heel also shows no s/s of infection. Patient reports seeing urology Wednesday and had his SP catheter exchanged. no s/s of UTI noted. Assessment is unchanged and VS WNL. Patient continues to benefit from SN visits for wound care to prevent infection and hospitalization.    Written Teaching Material Utilized: N/A    Interdisciplinary communication with: Vivian Krishnan LPN for the purpose of POC collaboration    Discharge planning as follows: When wound is 100% healed    Specific plan for next visit: wound care, medication education, skilled assessment

## 2024-06-30 ENCOUNTER — HOME CARE VISIT (OUTPATIENT)
Dept: HOME HEALTH SERVICES | Facility: HOME HEALTH | Age: 44
End: 2024-06-30
Payer: COMMERCIAL

## 2024-07-01 ENCOUNTER — HOME CARE VISIT (OUTPATIENT)
Facility: HOME HEALTH | Age: 44
End: 2024-07-01
Payer: COMMERCIAL

## 2024-07-01 VITALS
OXYGEN SATURATION: 99 % | DIASTOLIC BLOOD PRESSURE: 88 MMHG | RESPIRATION RATE: 16 BRPM | SYSTOLIC BLOOD PRESSURE: 160 MMHG | HEART RATE: 85 BPM | TEMPERATURE: 99.6 F

## 2024-07-01 PROCEDURE — G0300 HHS/HOSPICE OF LPN EA 15 MIN: HCPCS

## 2024-07-01 ASSESSMENT — ENCOUNTER SYMPTOMS
PAIN LOCATION - PAIN QUALITY: FIRE
BOWEL INCONTINENCE: 1

## 2024-07-01 NOTE — HOME HEALTH
Subjective: The cord to the wound vac somehow got caught onto a door handle and it came off of me. The foam was removed like we were told to do.  Falls since last visit No(if yes complete the Fall Tracking Form and include bsrifallreport):   Caregiver involvement changes: private aide present in other room  Home health supplies by type and quantity ordered/delivered this visit include: supplies in the home    Clinician asked if patient has had any physician contact since last home care visit and patient states: NO  Clinician asked if patient has any new or changed medications and patient states:  NO   If Yes, were medications reconciled? N/A   Was the certifying physician notified of changes in medications? N/A     Clinical assessment (what this visit means for the patient overall and need for ongoing skilled care) and progress or lack of progress towards SPECIFIC goals:     SN visited pt today for assessment and wound care. Patient is at a high risk for infection and rehospitalization related to wounds, mendez and fall risk. Patient is a quadriplegic and requires assitance with bathing and transferring. He has paid caregivers that help him get up out of bed in the mornings and then back into bed at night but otherwise patient is alone during the day. Patient has the use of his hands but unable to hold things or remove his wound vac if it fails. His mother who lives close by has already been educated on removal of the dressings to apply a wet to dry if needed; which she did yesterday. Patient also has a suprapubic cathter that he states was just recently changed. Wound care / NPWT was completed to left buttock stage 4 pressure injury and right heel stage 3 pressure injury. No s/s of infection noted. BP and temp were both elevated at beginning of visit. Once wound care was completed rechecked and temp was fine but BP was still outside of parameters. Patient had no c/o dizziness, headache, blurred vision, no shortness

## 2024-07-02 ENCOUNTER — HOME CARE VISIT (OUTPATIENT)
Facility: HOME HEALTH | Age: 44
End: 2024-07-02
Payer: COMMERCIAL

## 2024-07-02 VITALS
OXYGEN SATURATION: 97 % | HEART RATE: 103 BPM | DIASTOLIC BLOOD PRESSURE: 78 MMHG | RESPIRATION RATE: 20 BRPM | TEMPERATURE: 98.9 F | SYSTOLIC BLOOD PRESSURE: 100 MMHG

## 2024-07-02 PROCEDURE — G0300 HHS/HOSPICE OF LPN EA 15 MIN: HCPCS

## 2024-07-03 ENCOUNTER — HOME CARE VISIT (OUTPATIENT)
Dept: HOME HEALTH SERVICES | Facility: HOME HEALTH | Age: 44
End: 2024-07-03
Payer: COMMERCIAL

## 2024-07-05 ENCOUNTER — HOME CARE VISIT (OUTPATIENT)
Facility: HOME HEALTH | Age: 44
End: 2024-07-05
Payer: COMMERCIAL

## 2024-07-05 PROCEDURE — G0300 HHS/HOSPICE OF LPN EA 15 MIN: HCPCS

## 2024-07-05 NOTE — HOME HEALTH
Visit missed due to SN went out to see patient today for his wound care/wound vac. When I got there the patient said he had an appointment this afternoon at the wound clinic. I checked his temp while I was there and it was 100.1. The wound vac was hanging off I put a wet to dry on him. MD notified.

## 2024-07-08 ENCOUNTER — HOME CARE VISIT (OUTPATIENT)
Facility: HOME HEALTH | Age: 44
End: 2024-07-08
Payer: COMMERCIAL

## 2024-07-08 VITALS
SYSTOLIC BLOOD PRESSURE: 142 MMHG | TEMPERATURE: 99.4 F | OXYGEN SATURATION: 98 % | HEART RATE: 74 BPM | RESPIRATION RATE: 16 BRPM | DIASTOLIC BLOOD PRESSURE: 80 MMHG

## 2024-07-08 PROCEDURE — G0300 HHS/HOSPICE OF LPN EA 15 MIN: HCPCS

## 2024-07-08 ASSESSMENT — ENCOUNTER SYMPTOMS: BOWEL INCONTINENCE: 1

## 2024-07-08 NOTE — HOME HEALTH
Subjective: I always have pain in my right hand but otherwise I am fine. My mom was able to change the dressing this weekend and said it smelled bad so I am worried.  Falls since last visit No(if yes complete the Fall Tracking Form and include bsrifallreport):   Caregiver involvement changes: n/a  Home health supplies by type and quantity ordered/delivered this visit include: gauze, NS, heel foam    Clinician asked if patient has had any physician contact since last home care visit and patient states: NO  Clinician asked if patient has any new or changed medications and patient states:  NO   If Yes, were medications reconciled? N/A   Was the certifying physician notified of changes in medications? N/A     Clinical assessment (what this visit means for the patient overall and need for ongoing skilled care) and progress or lack of progress towards SPECIFIC goals:   SN visited pt today for assessment and wound care. Patient was in the bed lying slightly on his right side. His mendez cathter bag was inside of a basin he uses on the floor but the tubing was open to the basin. SN educated pt on the importance of having this secured and that even though the basin is on the floor the bag should not be lying inside the basin. Instructed that the basin should be cleaned daily with soap and water and should be stored in a plastic bag when not in use. Demonstrated that the blue clip on the cathter tubing can be attached to the edge of his bed sheet so that it is floating above the basin rather than lying inside of it where it is still apt to bacteria. Also that the mendez cathter bag tube has to be secured to prevent any UTI / infections. v/s were within defined parameters. Wound care was completed to buttock and right heel. No s/s of infection were noted. The odor diminished once cleansed. No abnormal drainage noted. Wound vac was in place and working when SN left. SN re-educated pt that if the vac is beeping to see if it is

## 2024-07-10 ENCOUNTER — HOME CARE VISIT (OUTPATIENT)
Facility: HOME HEALTH | Age: 44
End: 2024-07-10
Payer: COMMERCIAL

## 2024-07-10 VITALS
DIASTOLIC BLOOD PRESSURE: 86 MMHG | HEART RATE: 89 BPM | SYSTOLIC BLOOD PRESSURE: 132 MMHG | RESPIRATION RATE: 16 BRPM | TEMPERATURE: 98.9 F | OXYGEN SATURATION: 99 %

## 2024-07-10 PROCEDURE — G0299 HHS/HOSPICE OF RN EA 15 MIN: HCPCS

## 2024-07-10 ASSESSMENT — ENCOUNTER SYMPTOMS: BOWEL INCONTINENCE: 1

## 2024-07-12 ENCOUNTER — HOME CARE VISIT (OUTPATIENT)
Facility: HOME HEALTH | Age: 44
End: 2024-07-12
Payer: COMMERCIAL

## 2024-07-12 ENCOUNTER — HOSPITAL ENCOUNTER (OUTPATIENT)
Facility: HOSPITAL | Age: 44
Discharge: HOME | End: 2024-07-12
Attending: STUDENT IN AN ORGANIZED HEALTH CARE EDUCATION/TRAINING PROGRAM
Payer: COMMERCIAL

## 2024-07-12 VITALS
TEMPERATURE: 97.3 F | DIASTOLIC BLOOD PRESSURE: 51 MMHG | SYSTOLIC BLOOD PRESSURE: 81 MMHG | HEART RATE: 105 BPM | RESPIRATION RATE: 16 BRPM

## 2024-07-12 DIAGNOSIS — L89.613 PRESSURE INJURY OF RIGHT HEEL, STAGE 3 (HCC): ICD-10-CM

## 2024-07-12 DIAGNOSIS — G82.50 QUADRIPLEGIA, POST-TRAUMATIC (HCC): ICD-10-CM

## 2024-07-12 DIAGNOSIS — L89.150 PRESSURE INJURY OF SACRAL REGION, UNSTAGEABLE (HCC): Primary | ICD-10-CM

## 2024-07-12 DIAGNOSIS — S14.109S QUADRIPLEGIA, POST-TRAUMATIC (HCC): ICD-10-CM

## 2024-07-12 DIAGNOSIS — E10.65 TYPE 1 DIABETES MELLITUS WITH HYPERGLYCEMIA (HCC): ICD-10-CM

## 2024-07-12 PROCEDURE — 11042 DBRDMT SUBQ TIS 1ST 20SQCM/<: CPT

## 2024-07-12 PROCEDURE — 97605 NEG PRS WND THER DME<=50SQCM: CPT

## 2024-07-12 PROCEDURE — 11043 DBRDMT MUSC&/FSCA 1ST 20/<: CPT

## 2024-07-12 PROCEDURE — 87205 SMEAR GRAM STAIN: CPT

## 2024-07-12 PROCEDURE — 87070 CULTURE OTHR SPECIMN AEROBIC: CPT

## 2024-07-12 RX ORDER — CLOBETASOL PROPIONATE 0.5 MG/G
OINTMENT TOPICAL ONCE
OUTPATIENT
Start: 2024-07-12 | End: 2024-07-12

## 2024-07-12 RX ORDER — TRIAMCINOLONE ACETONIDE 1 MG/G
OINTMENT TOPICAL ONCE
OUTPATIENT
Start: 2024-07-12 | End: 2024-07-12

## 2024-07-12 RX ORDER — BACITRACIN ZINC AND POLYMYXIN B SULFATE 500; 1000 [USP'U]/G; [USP'U]/G
OINTMENT TOPICAL ONCE
OUTPATIENT
Start: 2024-07-12 | End: 2024-07-12

## 2024-07-12 RX ORDER — LIDOCAINE HYDROCHLORIDE 40 MG/ML
SOLUTION TOPICAL ONCE
OUTPATIENT
Start: 2024-07-12 | End: 2024-07-12

## 2024-07-12 RX ORDER — IBUPROFEN 200 MG
TABLET ORAL ONCE
OUTPATIENT
Start: 2024-07-12 | End: 2024-07-12

## 2024-07-12 RX ORDER — GENTAMICIN SULFATE 1 MG/G
OINTMENT TOPICAL ONCE
OUTPATIENT
Start: 2024-07-12 | End: 2024-07-12

## 2024-07-12 RX ORDER — LIDOCAINE 50 MG/G
OINTMENT TOPICAL ONCE
OUTPATIENT
Start: 2024-07-12 | End: 2024-07-12

## 2024-07-12 RX ORDER — SODIUM CHLOR/HYPOCHLOROUS ACID 0.033 %
SOLUTION, IRRIGATION IRRIGATION ONCE
OUTPATIENT
Start: 2024-07-12 | End: 2024-07-12

## 2024-07-12 RX ORDER — GINSENG 100 MG
CAPSULE ORAL ONCE
OUTPATIENT
Start: 2024-07-12 | End: 2024-07-12

## 2024-07-12 RX ORDER — BETAMETHASONE DIPROPIONATE 0.05 %
OINTMENT (GRAM) TOPICAL ONCE
OUTPATIENT
Start: 2024-07-12 | End: 2024-07-12

## 2024-07-12 RX ORDER — LIDOCAINE HYDROCHLORIDE 20 MG/ML
JELLY TOPICAL ONCE
OUTPATIENT
Start: 2024-07-12 | End: 2024-07-12

## 2024-07-12 RX ORDER — LIDOCAINE 40 MG/G
CREAM TOPICAL ONCE
OUTPATIENT
Start: 2024-07-12 | End: 2024-07-12

## 2024-07-12 RX ORDER — LEVOFLOXACIN 750 MG/1
750 TABLET, FILM COATED ORAL DAILY
Qty: 10 TABLET | Refills: 0 | Status: SHIPPED | OUTPATIENT
Start: 2024-07-12 | End: 2024-07-22

## 2024-07-12 NOTE — PROGRESS NOTES
insulin glargine (LANTUS;BASAGLAR) 100 UNIT/ML injection pen Inject 20 Units into the skin daily inject 20 units into the skin every morning.      polyethylene glycol (GLYCOLAX) 17 g packet Take 1 packet by mouth daily as needed for Constipation      senna-docusate (PERICOLACE) 8.6-50 MG per tablet Take 1 tablet by mouth daily      vitamin D (CHOLECALCIFEROL) 09451 UNIT CAPS Take 1 capsule by mouth once a week      Continuous Glucose Sensor (DEXCOM G6 SENSOR) MISC E11.9 CHECK GLUCOSE 4 TIMES DAILY, CHANGE SENSORS EVERY 10 DAYS      Insulin Degludec 100 UNIT/ML SOPN Inject 20 Units into the skin nightly      onabotulinumtoxinA (BOTOX) 100 units injection Inject 300 Units into the muscle every 3 months Emg guided to muscles of his lower extremities due to muscle spasticity 3 each 3    dantrolene (DANTRIUM) 25 MG capsule Take 1 capsule by mouth 4 times daily take 1 capsule by mouth 4 times a day      diazePAM (VALIUM) 10 MG tablet Take 1 tablet by mouth in the morning, at noon, and at bedtime.      insulin glargine (LANTUS) 100 UNIT/ML injection vial Inject 20 Units into the skin daily inject 20 units subcutaneously in the morning.      insulin lispro prot & lispro (HUMALOG 50/50) (50-50) 100 UNIT per ML SUSP injection Inject 0.04 mLs into the skin 3 times daily (with meals) sliding scale with meals  inject 4 units if bs <300   inject 6 units if bs 300-350  inject 9 units if bs over 351      ondansetron (ZOFRAN-ODT) 4 MG disintegrating tablet Take by mouth every 8 hours as needed       No current facility-administered medications on file prior to encounter.         Written patient dismissal instructions given to patient and signed by patient or POA.         Electronically signed by Michelle Salmeron MD on 7/12/2024 at 2:23 PM

## 2024-07-12 NOTE — DISCHARGE INSTRUCTIONS
Wound Clinic Physician Orders and Discharge Instructions  [facility]  [address], [address]  [place] [address]  Telephone: [phone]     FAX [phone]    NAME:  Brooks Lazar  YOB: 1980  MEDICAL RECORD NUMBER:  [id number]  DATE:  7/12/2024      Return Appointment:  [] Dressing Supply Provider:   [x] Home Healthcare: Ludin Odell   [x] Return Appointment: 7/16 TuesdayWeek(s) RECOMMEND PATIENT GO TO THE ER   [] Nurse Visit:     [] Discharge from [facility]:         [] Healed        [] Refer to Provider:         [] Consult    Follow-up Information:  [x] Ordered Tests: culture taken 7/12  [] Referrals:   [x] Rx: Levaquin to pharmacy   [] Other:   [x] Other: purple mattress     Wound Cleansing:   Do not scrub or use excessive force.  Cleanse wound prior to applying a clean dressing with:  [x] Normal Saline   [x] Keep Wound Dry in Shower     [x] Wound Cleanser   [x] Cleanse wound with Mild Soap & Water   [] Other: Triamcinolone   [] Other:     Topical Treatments:  Do not apply lotions, creams, or ointments to wound bed unless directed.   [] Apply moisturizing lotion to skin surrounding the wound prior to dressing change.  [] Apply antifungal ointment to skin surrounding the wound prior to dressing change.  [] Apply thin film of moisture barrier ointment to skin immediately around wound.  [] Apply Betadine to skin immediately around wound   [] Other:                                                                                   Dressings:           Wound Location Right Heel    [x] Apply Primary Dressing:       [] MediHoney Gel                              [] MediHoney Alginate  [] Calcium Alginate with Silver               [] Calcium Alginate without silver   [] Collagen with silver                  [] Collagen without Silver    [] Santyl with moist saline gauze        [] Betadine wet to dry  [x] Hydrofera Blue Ready (cut to size)       [] Hydrofera Blue (cut

## 2024-07-12 NOTE — WOUND CARE
Negative Pressure Wound Therapy    NAME:  Brooks Lazar  YOB: 1980  MEDICAL RECORD NUMBER:  [id number]  DATE:  7/12/2024    Applied Negative Pressure to left buttock wound(s)/ulcer(s).  [x] Applied skin barrier prep to augustin-wound.   [x] Cut strips of plastic drape to picture frame wound so that augustin-wound is     covered with the drape.   [x] If bridging dressing to less prominent site, cover any intact skin that will come in contact with the Negative Pressure Therapy sponge, gauze or channel drain with plastic drape. The sponge should never touch intact skin.   [x] Cut sponge, gauze or channel drain to size which will fit into the wound/ulcer bed without being forced.   [x] Be sure the sponge is large enough to hold the entire round plastic flange which is attached to the tubing. Never allow flange to be larger than the sponge or it will produce suction damaging intact skin.  Total number of individual pieces of foam used within the wound bed: 1    [x] If bridging the dressing away from the primary site, be sure the bridge leads to a piece of sponge large enough to hold the entire flange without allowing any of the flange to overlap onto intact skin.   [x] Covered sponge, gauze or channel drain with plastic drape.   [x] Cut a hole in this plastic drape directly over the sponge the same size as the plastic drain tubing.   [x] Removed plastic liner from flange and apply it directly over the hole you cut.   [x] Removed the plastic cover from the flange.   [x] Attached the tubing to the wound/ulcer Negative Pressure Therapy and turn it on to be sure a vacuum is created and that there are no leaks.   [x] If air leaks occur, use plastic drape to patch them.   [] Secured Negative Pressure Therapy dressing with ace wrap loosely if located on an extremity. Maintain tubing outside of ace wrap. Tubing must not exert pressure on intact skin.    Applied per  Guidelines      Electronically signed by 
  Negative Pressure:           Wound Location: Left Buttocks  [x] Pressure @ 125 mm/Hg  [x]Continuous     []Intermittent   [x] Black Foam  [x] White Foam to undermining  [x] Bridge  [x] Change dressing 3 times per week     [] Other:     Pressure Relief:  [x] When sitting, shift position or do seat lifts every 15 minutes.  [x] Wheelchair cushion [x] Specialty Bed/Mattress  [x] Turn every 2 hours when in bed.  Avoid direct pressure on wound site.  Limit side lying to 30 degree tilt.  Limit HOB elevation to 30 degrees.  [x] Other: Keep Pressure Off Wounds     Edema Control:  Apply: [] Compression Stocking:  mmHg  []Right Leg []Left Leg   [] Tubigrip []Right Leg Double Layer []Left Leg Double Layer      []Right Leg Single Layer []Left Leg Single Layer      [] Elevate leg(s) above the level of the heart when sitting.    [] Avoid prolonged standing in one place.     Compression:  Apply: [] Compression Wrap to []RightLeg []Left Leg   []  Coflex                [] Coflex Lite              [] Unna with Calamine Lite   [] Do not get leg(s) with wrap wet. If wrap becomes too tight, call the wound center and remove wrap from leg by unrolling each layer.   [] Elevate leg(s) above the level of the heart when sitting.    [] Avoid prolonged standing in one place.    Off-Loading:   [] Off-loading when: [] walking            [] in bed         [] Sitting                             [] Surgical shoe       [] Foam Boot(s)    [] Cast Boot            [] CROW Boot        [] Diabetic Shoes    [] Offloading heel boot       [] Wedge Shoe        [] Knee Scooter          [] Other:     [] Total non-weight bearing  [] Right Leg  [] Left Leg     [] Assistive Device [] Walker                [] Cane                 [] Wheelchair  [] Crutches                           [] Surgical shoe     [] Wedge Shoe     [] Foam Boot(s)       [] Knee Scooter                            [] Cast Boot         [] CROW Boot      [] Diabetic Shoes    [] Offloading heel

## 2024-07-14 LAB
BACTERIA SPEC CULT: NORMAL
BACTERIA SPEC CULT: NORMAL
GRAM STN SPEC: NORMAL
Lab: NORMAL

## 2024-07-15 ENCOUNTER — HOME CARE VISIT (OUTPATIENT)
Facility: HOME HEALTH | Age: 44
End: 2024-07-15
Payer: COMMERCIAL

## 2024-07-16 ENCOUNTER — TELEPHONE (OUTPATIENT)
Facility: HOSPITAL | Age: 44
End: 2024-07-16

## 2024-07-16 ENCOUNTER — HOME CARE VISIT (OUTPATIENT)
Dept: HOME HEALTH SERVICES | Facility: HOME HEALTH | Age: 44
End: 2024-07-16
Payer: COMMERCIAL

## 2024-07-16 ENCOUNTER — FOLLOWUP TELEPHONE ENCOUNTER (OUTPATIENT)
Facility: HOSPITAL | Age: 44
End: 2024-07-16

## 2024-07-16 DIAGNOSIS — T14.8XXA WOUND INFECTION: Primary | ICD-10-CM

## 2024-07-16 DIAGNOSIS — L08.9 WOUND INFECTION: Primary | ICD-10-CM

## 2024-07-16 LAB
BACTERIA SPEC CULT: ABNORMAL
GRAM STN SPEC: ABNORMAL
Lab: ABNORMAL

## 2024-07-16 RX ORDER — DOXYCYCLINE HYCLATE 100 MG
100 TABLET ORAL 2 TIMES DAILY
Qty: 14 TABLET | Refills: 0 | Status: SHIPPED | OUTPATIENT
Start: 2024-07-16 | End: 2024-07-23

## 2024-07-16 NOTE — TELEPHONE ENCOUNTER
Called patient regarding culture results. Had to leave a message for patient to return call. Dr. Salmeron sent in a prescription to [facility] in [place] for Doxycycline.

## 2024-07-16 NOTE — TELEPHONE ENCOUNTER
Assumed care at 0700, report received from NOC RN.  Pt sitting up in bed awake, A&O x4. Bed locked and in lowest position. Call light and belongings within reach. Whiteboard updated with RN and CNA phone numbers   Culture growing MRSA. Will send Doxycycline    Michelle Salmeron MD

## 2024-07-27 ENCOUNTER — HOME CARE VISIT (OUTPATIENT)
Facility: HOME HEALTH | Age: 44
End: 2024-07-27
Payer: COMMERCIAL

## 2024-07-27 VITALS
RESPIRATION RATE: 18 BRPM | DIASTOLIC BLOOD PRESSURE: 40 MMHG | WEIGHT: 143 LBS | TEMPERATURE: 98.2 F | HEART RATE: 88 BPM | BODY MASS INDEX: 19.94 KG/M2 | SYSTOLIC BLOOD PRESSURE: 118 MMHG | OXYGEN SATURATION: 99 %

## 2024-07-27 PROCEDURE — G0299 HHS/HOSPICE OF RN EA 15 MIN: HCPCS

## 2024-07-28 ENCOUNTER — HOME CARE VISIT (OUTPATIENT)
Facility: HOME HEALTH | Age: 44
End: 2024-07-28
Payer: COMMERCIAL

## 2024-07-28 ENCOUNTER — HOME CARE VISIT (OUTPATIENT)
Dept: HOME HEALTH SERVICES | Facility: HOME HEALTH | Age: 44
End: 2024-07-28
Payer: COMMERCIAL

## 2024-07-28 VITALS
DIASTOLIC BLOOD PRESSURE: 64 MMHG | TEMPERATURE: 99.9 F | OXYGEN SATURATION: 100 % | SYSTOLIC BLOOD PRESSURE: 140 MMHG | HEART RATE: 90 BPM | RESPIRATION RATE: 16 BRPM

## 2024-07-28 PROCEDURE — G0300 HHS/HOSPICE OF LPN EA 15 MIN: HCPCS

## 2024-07-28 NOTE — HOME HEALTH
Reason for referral, [facility] SUMMARY of clinical condition: Brooks Lazar admitted to home care for Osteomyelitis. Nursing needed for Wound Vac,Wound Care, PICC line dresing changes and Labs.     Clinical Assessment/Skilled reason for admission to home health: Pt has a stage 4 wound on left buttocks with wound vac. Pt wound vac is causing complications and making noise but pressure remains intact. Pt also has a stage 3 pressure injury that is almost completedly closed on right heel. Pt also has a small Pt is quadrapeligic and uses a motorized wheelchair.      Functional Mobility:  Bed Mobility (rolling/scooting): Dependent (requires assistance for all effort OR requires assist of 2 or more people)  Transfers (supine to chair and return to supine): Dependent (requires assistance for all effort OR requires assist of 2 or more people). Patient uses device: yes  Gait:  Ambulates with maximum assistance using a wheelchair  Safety concerns with mobility include: Wheelchair bound  DME: wheelchair    Diagnosis: Osteomyelitis, Stage 3 right heel, unstageable pressure injury to sacrum, Type 1 DM, Paraplegia    Subjective: \"I just dont feel like myself today.\"    Caregiver: other.  Caregiver assists with: Medications, Meals, Bathing, ADL, Transportation and Housekeeping Caregiver unable to assist with: Wound care. Caregiver is available Regularly and multiple times of the day. Caregiver is  present at this visit and did participate with clinician.    Medications reconciled and all medications are available in the home this visit. The following education was provided regarding medications: medication interactions and look alike medications: NA. Patient/CG able to demonstrate knowledge through teach back with 100 percent accuracy.      Michelle Salmeron MD notified of any discrepancies/medication interactions NA.       A list of reconciled medications has been given to the patient/caregiver  and uploaded to media.    High risk med  No

## 2024-07-29 ENCOUNTER — TELEPHONE (OUTPATIENT)
Facility: CLINIC | Age: 44
End: 2024-07-29

## 2024-07-29 ENCOUNTER — HOME CARE VISIT (OUTPATIENT)
Facility: HOME HEALTH | Age: 44
End: 2024-07-29
Payer: COMMERCIAL

## 2024-07-29 VITALS
OXYGEN SATURATION: 99 % | TEMPERATURE: 97.6 F | HEART RATE: 95 BPM | RESPIRATION RATE: 16 BRPM | SYSTOLIC BLOOD PRESSURE: 100 MMHG | DIASTOLIC BLOOD PRESSURE: 60 MMHG

## 2024-07-29 VITALS
TEMPERATURE: 97.6 F | HEART RATE: 95 BPM | OXYGEN SATURATION: 99 % | SYSTOLIC BLOOD PRESSURE: 100 MMHG | RESPIRATION RATE: 18 BRPM | DIASTOLIC BLOOD PRESSURE: 60 MMHG

## 2024-07-29 PROCEDURE — G0299 HHS/HOSPICE OF RN EA 15 MIN: HCPCS

## 2024-07-29 PROCEDURE — G0151 HHCP-SERV OF PT,EA 15 MIN: HCPCS
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
## 2024-07-29 NOTE — HOME HEALTH
Subjective: I haven't gotten any of my supplies yet.  Falls since last visit No(if yes complete the Fall Tracking Form and include bsrifallreport):   Caregiver involvement changes: n/a  Home health supplies by type and quantity ordered/delivered this visit include: PICC line dressing kits ordered from [employer]    Clinician asked if patient has had any physician contact since last home care visit and patient states: NO  Clinician asked if patient has any new or changed medications and patient states:  NO   If Yes, were medications reconciled? N/A   Was the certifying physician notified of changes in medications? N/A     Clinical assessment (what this visit means for the patient overall and need for ongoing skilled care) and progress or lack of progress towards SPECIFIC goals: Nursing visited patient today with intention of changing PICC dressing, wound vac dressing, dressing to right heel, and to draw labs.  Upon nursing's arrival, patient states he has not yet received wound vac or picc line dressing supplies.  Dressing changed to right heel as ordered.  Unable to draw labs as it was too early for the vanc trough.  All supplies ordered and should be in the home by the next nursing visit on 7/31/24.  PICC dressing due to be changed 7/30/24, so this will be a day late.  Provider notified of the above.    Written Teaching Material Utilized: N/A    Interdisciplinary communication with: Dr. Salmeron Physician for the purpose of POC collaboration    Discharge planning as follows: Is no longer homebound, Per physician order and When goals are met    Specific plan for next visit: wound care and assessment, PICC dressing change and lab draw

## 2024-07-29 NOTE — TELEPHONE ENCOUNTER
[facility]   (formerly [facility])   Phone:  [phone]      Fax:  [phone] [address], [address]  [place] [address]    Name:  Willy Mcfarland  YOB: 1980      Incoming call from home health nurse who is asking if [facility] goes to patients' homes to perform routine wound care.  She states that Mr. Mcfarland is a quadriplegic and he has a stage IV wound.  He currently has a provider that makes home visits for wound care but he is looking to possibly switch.  Nurse says that patient's PCP is Dr. Karina Hernandez at [facility]. This nurse suggested that since patient's P[facility] is at [facility], home health nurse call [facility] or check with his PCP about wound services through [facility].  This nurse also mentioned the [facility].    Christa Crandall RN, Gerontological Nursing-BC, PN

## 2024-07-31 ENCOUNTER — HOSPITAL ENCOUNTER (EMERGENCY)
Facility: HOSPITAL | Age: 44
Discharge: HOME | End: 2024-07-31
Attending: EMERGENCY MEDICINE
Payer: MEDICARE

## 2024-07-31 ENCOUNTER — HOME CARE VISIT (OUTPATIENT)
Facility: HOME HEALTH | Age: 44
End: 2024-07-31
Payer: COMMERCIAL

## 2024-07-31 VITALS
BODY MASS INDEX: 20.02 KG/M2 | OXYGEN SATURATION: 100 % | RESPIRATION RATE: 18 BRPM | SYSTOLIC BLOOD PRESSURE: 125 MMHG | DIASTOLIC BLOOD PRESSURE: 66 MMHG | WEIGHT: 143 LBS | HEIGHT: 71 IN | HEART RATE: 99 BPM | TEMPERATURE: 99.7 F

## 2024-07-31 DIAGNOSIS — S91.209A AVULSION OF TOENAIL, INITIAL ENCOUNTER: Primary | ICD-10-CM

## 2024-07-31 PROCEDURE — G0300 HHS/HOSPICE OF LPN EA 15 MIN: HCPCS

## 2024-07-31 PROCEDURE — MED13570

## 2024-07-31 PROCEDURE — 99282 EMERGENCY DEPT VISIT SF MDM: CPT

## 2024-07-31 ASSESSMENT — ENCOUNTER SYMPTOMS: NAUSEA: 1
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
## 2024-08-01 ENCOUNTER — HOSPITAL ENCOUNTER (EMERGENCY)
Facility: HOSPITAL | Age: 44
Discharge: HOME | End: 2024-08-02
Attending: STUDENT IN AN ORGANIZED HEALTH CARE EDUCATION/TRAINING PROGRAM
Payer: MEDICARE

## 2024-08-01 ENCOUNTER — HOME CARE VISIT (OUTPATIENT)
Dept: HOME HEALTH SERVICES | Facility: HOME HEALTH | Age: 44
End: 2024-08-01
Payer: COMMERCIAL

## 2024-08-01 ENCOUNTER — HOME CARE VISIT (OUTPATIENT)
Facility: HOME HEALTH | Age: 44
End: 2024-08-01
Payer: COMMERCIAL

## 2024-08-01 VITALS
RESPIRATION RATE: 18 BRPM | WEIGHT: 142 LBS | HEIGHT: 71 IN | BODY MASS INDEX: 19.88 KG/M2 | HEART RATE: 89 BPM | OXYGEN SATURATION: 99 % | DIASTOLIC BLOOD PRESSURE: 74 MMHG | SYSTOLIC BLOOD PRESSURE: 120 MMHG | TEMPERATURE: 100.9 F

## 2024-08-01 DIAGNOSIS — D64.9 ANEMIA, UNSPECIFIED TYPE: Primary | ICD-10-CM

## 2024-08-01 LAB
ALBUMIN SERPL-MCNC: 2.9 G/DL (ref 4.1–5.1)
ALP SERPL-CCNC: 92 IU/L (ref 44–121)
ALT SERPL-CCNC: 10 IU/L (ref 0–44)
AST SERPL-CCNC: 17 IU/L (ref 0–40)
BASOPHILS # BLD AUTO: 0 X10E3/UL (ref 0–0.2)
BASOPHILS NFR BLD AUTO: 0 %
BILIRUB SERPL-MCNC: <0.2 MG/DL (ref 0–1.2)
BUN SERPL-MCNC: 10 MG/DL (ref 6–24)
BUN/CREAT SERPL: 18 (ref 9–20)
CALCIUM SERPL-MCNC: 7.8 MG/DL (ref 8.7–10.2)
CHLORIDE SERPL-SCNC: 100 MMOL/L (ref 96–106)
CO2 SERPL-SCNC: 22 MMOL/L (ref 20–29)
CREAT SERPL-MCNC: 0.57 MG/DL (ref 0.76–1.27)
CRP SERPL HS-MCNC: 74.61 MG/L (ref 0–3)
EGFRCR SERPLBLD CKD-EPI 2021: 124 ML/MIN/1.73
EOSINOPHIL # BLD AUTO: 0.2 X10E3/UL (ref 0–0.4)
EOSINOPHIL NFR BLD AUTO: 2 %
ERYTHROCYTE [DISTWIDTH] IN BLOOD BY AUTOMATED COUNT: 14.8 % (ref 11.6–15.4)
GLOBULIN SER CALC-MCNC: 2.5 G/DL (ref 1.5–4.5)
GLUCOSE SERPL-MCNC: 389 MG/DL (ref 70–99)
HCT VFR BLD AUTO: 19.9 % (ref 37.5–51)
HGB BLD-MCNC: 6.3 G/DL (ref 13–17.7)
IMM GRANULOCYTES # BLD AUTO: 0 X10E3/UL (ref 0–0.1)
IMM GRANULOCYTES NFR BLD AUTO: 0 %
LACTATE BLD-SCNC: 0.91 MMOL/L (ref 0.4–2)
LYMPHOCYTES # BLD AUTO: 1.2 X10E3/UL (ref 0.7–3.1)
LYMPHOCYTES NFR BLD AUTO: 12 %
MCH RBC QN AUTO: 28 PG (ref 26.6–33)
MCHC RBC AUTO-ENTMCNC: 31.7 G/DL (ref 31.5–35.7)
MCV RBC AUTO: 88 FL (ref 79–97)
MONOCYTES # BLD AUTO: 0.7 X10E3/UL (ref 0.1–0.9)
MONOCYTES NFR BLD AUTO: 7 %
NEUTROPHILS # BLD AUTO: 7.9 X10E3/UL (ref 1.4–7)
NEUTROPHILS NFR BLD AUTO: 79 %
PLATELET # BLD AUTO: 272 X10E3/UL (ref 150–450)
POTASSIUM SERPL-SCNC: 3.6 MMOL/L (ref 3.5–5.2)
PROT SERPL-MCNC: 5.4 G/DL (ref 6–8.5)
RBC # BLD AUTO: 2.25 X10E6/UL (ref 4.14–5.8)
SODIUM SERPL-SCNC: 137 MMOL/L (ref 134–144)
WBC # BLD AUTO: 10.1 X10E3/UL (ref 3.4–10.8)

## 2024-08-01 PROCEDURE — 99283 EMERGENCY DEPT VISIT LOW MDM: CPT

## 2024-08-01 PROCEDURE — 86900 BLOOD TYPING SEROLOGIC ABO: CPT

## 2024-08-01 PROCEDURE — 83605 ASSAY OF LACTIC ACID: CPT

## 2024-08-01 PROCEDURE — 36415 COLL VENOUS BLD VENIPUNCTURE: CPT

## 2024-08-01 PROCEDURE — 86901 BLOOD TYPING SEROLOGIC RH(D): CPT

## 2024-08-01 PROCEDURE — 86850 RBC ANTIBODY SCREEN: CPT

## 2024-08-01 PROCEDURE — 87040 BLOOD CULTURE FOR BACTERIA: CPT

## 2024-08-01 PROCEDURE — 85025 COMPLETE CBC W/AUTO DIFF WBC: CPT

## 2024-08-01 PROCEDURE — 80053 COMPREHEN METABOLIC PANEL: CPT

## 2024-08-01 ASSESSMENT — ENCOUNTER SYMPTOMS
SHORTNESS OF BREATH: 0
VOMITING: 0
ABDOMINAL PAIN: 0
EYE DISCHARGE: 0
COUGH: 0
EYE REDNESS: 0
RHINORRHEA: 0
DIARRHEA: 0
NAUSEA: 0

## 2024-08-01 ASSESSMENT — LIFESTYLE VARIABLES
HOW MANY STANDARD DRINKS CONTAINING ALCOHOL DO YOU HAVE ON A TYPICAL DAY: 1 OR 2
HOW OFTEN DO YOU HAVE A DRINK CONTAINING ALCOHOL: MONTHLY OR LESS

## 2024-08-01 ASSESSMENT — PAIN - FUNCTIONAL ASSESSMENT: PAIN_FUNCTIONAL_ASSESSMENT: NONE - DENIES PAIN
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
## 2024-08-02 ENCOUNTER — HOSPITAL ENCOUNTER (EMERGENCY)
Facility: HOSPITAL | Age: 44
Discharge: LEFT BEFORE DISCHARGE | End: 2024-08-02
Payer: MEDICARE

## 2024-08-02 ENCOUNTER — HOME CARE VISIT (OUTPATIENT)
Dept: HOME HEALTH SERVICES | Facility: HOME HEALTH | Age: 44
End: 2024-08-02
Payer: COMMERCIAL

## 2024-08-02 ENCOUNTER — HOSPITAL ENCOUNTER (EMERGENCY)
Facility: HOSPITAL | Age: 44
Discharge: LEFT BEFORE DISCHARGE | End: 2024-08-02
Attending: EMERGENCY MEDICINE
Payer: MEDICARE

## 2024-08-02 VITALS
BODY MASS INDEX: 19.88 KG/M2 | HEART RATE: 93 BPM | TEMPERATURE: 98.7 F | HEIGHT: 71 IN | SYSTOLIC BLOOD PRESSURE: 135 MMHG | RESPIRATION RATE: 18 BRPM | DIASTOLIC BLOOD PRESSURE: 64 MMHG | WEIGHT: 142 LBS | OXYGEN SATURATION: 100 %

## 2024-08-02 VITALS
TEMPERATURE: 98 F | RESPIRATION RATE: 18 BRPM | HEART RATE: 95 BPM | SYSTOLIC BLOOD PRESSURE: 134 MMHG | DIASTOLIC BLOOD PRESSURE: 75 MMHG | OXYGEN SATURATION: 100 %

## 2024-08-02 DIAGNOSIS — Z79.2 ANTIBIOTIC LONG-TERM USE: Primary | ICD-10-CM

## 2024-08-02 LAB
ABO + RH BLD: NORMAL
ABO + RH BLD: NORMAL
ALBUMIN SERPL-MCNC: 2.2 G/DL (ref 3.5–5)
ALBUMIN SERPL-MCNC: 2.8 G/DL (ref 3.5–5.2)
ALBUMIN/GLOB SERPL: 0.5 (ref 1.1–2.2)
ALBUMIN/GLOB SERPL: 0.8 (ref 1.1–2.2)
ALP SERPL-CCNC: 89 U/L (ref 40–129)
ALP SERPL-CCNC: 92 U/L (ref 45–117)
ALT SERPL-CCNC: 12 U/L (ref 10–50)
ALT SERPL-CCNC: 12 U/L (ref 12–78)
ANION GAP SERPL CALC-SCNC: 12 MMOL/L (ref 5–15)
ANION GAP SERPL CALC-SCNC: 9 MMOL/L (ref 5–15)
AST SERPL-CCNC: 13 U/L (ref 10–50)
AST SERPL-CCNC: 8 U/L (ref 15–37)
BASOPHILS # BLD: 0 K/UL (ref 0–0.1)
BASOPHILS # BLD: 0 K/UL (ref 0–0.1)
BASOPHILS NFR BLD: 0 % (ref 0–1)
BASOPHILS NFR BLD: 0 % (ref 0–1)
BILIRUB SERPL-MCNC: 0.2 MG/DL (ref 0.2–1)
BILIRUB SERPL-MCNC: 0.3 MG/DL (ref 0.2–1)
BLOOD GROUP ANTIBODIES SERPL: NORMAL
BLOOD GROUP ANTIBODIES SERPL: NORMAL
BUN SERPL-MCNC: 10 MG/DL (ref 6–20)
BUN SERPL-MCNC: 9 MG/DL (ref 6–20)
BUN/CREAT SERPL: 12 (ref 12–20)
BUN/CREAT SERPL: 17 (ref 12–20)
CALCIUM SERPL-MCNC: 8.3 MG/DL (ref 8.6–10)
CALCIUM SERPL-MCNC: 8.7 MG/DL (ref 8.5–10.1)
CHLORIDE SERPL-SCNC: 102 MMOL/L (ref 97–108)
CHLORIDE SERPL-SCNC: 97 MMOL/L (ref 98–107)
CO2 SERPL-SCNC: 25 MMOL/L (ref 21–32)
CO2 SERPL-SCNC: 25 MMOL/L (ref 22–29)
COMMENT:: NORMAL
CREAT SERPL-MCNC: 0.53 MG/DL (ref 0.7–1.2)
CREAT SERPL-MCNC: 0.81 MG/DL (ref 0.7–1.3)
CRP SERPL-MCNC: 59 MG/L (ref 0–10)
DIFFERENTIAL METHOD BLD: ABNORMAL
EOSINOPHIL # BLD: 0.2 K/UL (ref 0–0.4)
EOSINOPHIL NFR BLD: 2 % (ref 0–7)
ERYTHROCYTE [DISTWIDTH] IN BLOOD BY AUTOMATED COUNT: 17.8 % (ref 11.5–14.5)
GLOBULIN SER CALC-MCNC: 3.4 G/DL (ref 2–4)
GLOBULIN SER CALC-MCNC: 4.3 G/DL (ref 2–4)
GLUCOSE SERPL-MCNC: 193 MG/DL (ref 65–100)
GLUCOSE SERPL-MCNC: 246 MG/DL (ref 65–100)
HCT VFR BLD AUTO: 20.8 % (ref 36.6–50.3)
HCT VFR BLD AUTO: 21.8 % (ref 36.6–50.3)
HGB BLD-MCNC: 6.9 G/DL (ref 12.1–17)
HGB BLD-MCNC: 7.4 G/DL (ref 12.1–17)
IMM GRANULOCYTES # BLD AUTO: 0.1 K/UL (ref 0–0.04)
IMM GRANULOCYTES # BLD AUTO: 0.1 K/UL (ref 0–0.04)
IMM GRANULOCYTES NFR BLD AUTO: 1 % (ref 0–0.5)
IMM GRANULOCYTES NFR BLD AUTO: 1 % (ref 0–0.5)
LYMPHOCYTES # BLD: 1.3 K/UL (ref 0.8–3.5)
LYMPHOCYTES # BLD: 1.5 K/UL (ref 0.8–3.5)
LYMPHOCYTES NFR BLD: 11 % (ref 12–49)
LYMPHOCYTES NFR BLD: 13 % (ref 12–49)
MCH RBC QN AUTO: 28.5 PG (ref 26–34)
MCHC RBC AUTO-ENTMCNC: 33.2 G/DL (ref 30–36.5)
MCHC RBC AUTO-ENTMCNC: 33.9 G/DL (ref 30–36.5)
MCV RBC AUTO: 86 FL (ref 80–99)
MCV RBC AUTO: 86.2 FL (ref 80–99)
MONOCYTES # BLD: 0.8 K/UL (ref 0–1)
MONOCYTES # BLD: 0.9 K/UL (ref 0–1)
MONOCYTES NFR BLD: 7 % (ref 5–13)
MONOCYTES NFR BLD: 8 % (ref 5–13)
NEUTS SEG # BLD: 8.9 K/UL (ref 1.8–8)
NEUTS SEG # BLD: 9.3 K/UL (ref 1.8–8)
NEUTS SEG NFR BLD: 77 % (ref 32–75)
NEUTS SEG NFR BLD: 78 % (ref 32–75)
NRBC # BLD: 0 K/UL (ref 0–0.01)
NRBC # BLD: 0 K/UL (ref 0–0.01)
NRBC BLD-RTO: 0 PER 100 WBC
NRBC BLD-RTO: 0 PER 100 WBC
PLATELET # BLD AUTO: 479 K/UL (ref 150–400)
PMV BLD AUTO: 9.2 FL (ref 8.9–12.9)
PMV BLD AUTO: 9.3 FL (ref 8.9–12.9)
POTASSIUM SERPL-SCNC: 3.6 MMOL/L (ref 3.5–5.1)
POTASSIUM SERPL-SCNC: 3.7 MMOL/L (ref 3.5–5.1)
PROT SERPL-MCNC: 6.2 G/DL (ref 6.4–8.3)
PROT SERPL-MCNC: 6.5 G/DL (ref 6.4–8.2)
RBC # BLD AUTO: 2.42 M/UL (ref 4.1–5.7)
RBC # BLD AUTO: 2.53 M/UL (ref 4.1–5.7)
RBC MORPH BLD: ABNORMAL
SODIUM SERPL-SCNC: 134 MMOL/L (ref 136–145)
SODIUM SERPL-SCNC: 136 MMOL/L (ref 136–145)
SPECIMEN EXP DATE BLD: NORMAL
SPECIMEN EXP DATE BLD: NORMAL
SPECIMEN HOLD: NORMAL
SPECIMEN STATUS REPORT: NORMAL
VANCOMYCIN PEAK SERPL-MCNC: 6 UG/ML (ref 25–40)
WBC # BLD AUTO: 11.5 K/UL (ref 4.1–11.1)
WBC # BLD AUTO: 11.8 K/UL (ref 4.1–11.1)

## 2024-08-02 PROCEDURE — 4500000002 HC ER NO CHARGE

## 2024-08-02 PROCEDURE — 86900 BLOOD TYPING SEROLOGIC ABO: CPT

## 2024-08-02 PROCEDURE — 99282 EMERGENCY DEPT VISIT SF MDM: CPT

## 2024-08-02 PROCEDURE — 80053 COMPREHEN METABOLIC PANEL: CPT

## 2024-08-02 PROCEDURE — 36415 COLL VENOUS BLD VENIPUNCTURE: CPT

## 2024-08-02 PROCEDURE — 85025 COMPLETE CBC W/AUTO DIFF WBC: CPT

## 2024-08-02 PROCEDURE — 86850 RBC ANTIBODY SCREEN: CPT

## 2024-08-02 PROCEDURE — 86901 BLOOD TYPING SEROLOGIC RH(D): CPT

## 2024-08-02 ASSESSMENT — LIFESTYLE VARIABLES
HOW OFTEN DO YOU HAVE A DRINK CONTAINING ALCOHOL: MONTHLY OR LESS
HOW MANY STANDARD DRINKS CONTAINING ALCOHOL DO YOU HAVE ON A TYPICAL DAY: 1 OR 2

## 2024-08-02 ASSESSMENT — ENCOUNTER SYMPTOMS: SHORTNESS OF BREATH: 0

## 2024-08-02 ASSESSMENT — PAIN - FUNCTIONAL ASSESSMENT: PAIN_FUNCTIONAL_ASSESSMENT: NONE - DENIES PAIN

## 2024-08-02 NOTE — ED NOTES
5:04 PM    Has now been seen twice in 24 hours in ERs in 24 hours. Left [facility] last night but was offered admission for anemia (Hgb 6).  Stated \"I had too much to do with my jobs.\" He has home health coming to his home to administered scheduled Vancomycin via PICC and there seem to be a lot of limitations in managing his care at home. This morning Dr. Emerson had recommended admission again, not just for his anemia, but also for case management coordination. Pt left the ED without speaking to staff, he states \"I was tired and couldn't wait for it anymore.\" States that one of his friends administered his Vanc after he left the ER.   Didn't receive evening Vanc as well.     Denies any new fevers, chills, lightheadedness, dizziness, CP, SOB, N/V, or other concerns.     I have evaluated the patient as the Provider in Rapid Medical Evaluation (RME). I have reviewed his vital signs and the triage nurse assessment. I have talked with the patient and any available family and advised that I am the provider in triage and have ordered the appropriate study to initiate their work up based on the clinical presentation during my assessment. I have advised that the patient will be accommodated in the Main ED as soon as possible. I have also requested to contact the triage nurse or myself immediately if the patient experiences any changes in their condition during this brief waiting period.  MEHRDAD Heredia NP, Leslie A, APRN - NP  08/02/24 8029

## 2024-08-02 NOTE — ED TRIAGE NOTES
Pt arrives to ED in power wheel chair w/ c/o not having a support system to help him give his medications through his PICC line. Pt also states his hemoglobin was low last night when he was seen in the ED. Pt states he is following with case management.  No acute distress noted in triage. A&O x 4.

## 2024-08-02 NOTE — CARE COORDINATION
8/2/24   10:02 AM    CM received a call and noted the consult for home health services. CM spoke to Malini at [facility] who directed CM to speak to the MD. CM spoke to the attending and found that patient is having trouble with someone being available to administer his IV abx as his mother will no longer assist him. CM called patient to discuss, he is working on finding someone to help him. 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 CM discussed other options including going to a facility, patient informed CM that he works and cannot go to a facility for medication administration. Patient is open with [facility] services and he is happy with the services that he receives from [facility]. Patient let CM know that he is having trouble with his medications from [facility]-  CM called the rep and the rep will reach out to the patient. CM received a call back from the rep and was told that he received his medications yesterday and should have enough for a week. CM asked the rep to call the patient to discuss.     No further CM needs at this time.    Sylvie Little, MSW  [facility]      Available via [employer]

## 2024-08-02 NOTE — DISCHARGE INSTRUCTIONS
You were seen in the Emergency Department for blood work.  Your hemoglobin is low at 6.9 although it is improved from previous lab work.  We discussed admission for transfusion and further evaluation for your sacral wound and PICC line additionally setting up with home health care given your current family situation.  You have requested to go home and come back in the morning.  Return as needed or if worsening condition.

## 2024-08-02 NOTE — ED NOTES
PT eloped at 1010 while nursing staff was mixing vancomycin for administration.    PT escorted from facility with friend that accompanied him to this visit.

## 2024-08-02 NOTE — ED PROVIDER NOTES
[facility] [place]ERGENCY DEPT  EMERGENCY DEPARTMENT ENCOUNTER      Pt Name: Brooks Lazar  MRN: [id number]  Birthdate 1980  Date of evaluation: 8/1/2024  Provider: Kelli Moyer DO    CHIEF COMPLAINT       Chief Complaint   Patient presents with    Fever    Blood work request         HISTORY OF PRESENT ILLNESS    HPI    Brooks Lazar is a 43 y.o. male with a history of HTN, quadriplegic with chronic left gluteal decubitus ulcer status post debridement and wound VAC in place several weeks ago and on IV antibiotics via PICC line who presents to the emergency department for evaluation of abnormal blood work.  Patient reports he had routine blood work performed yesterday at home via his PICC line, and was told today that his hemoglobin was low at 6.6.  Patient also stating he felt hot and sweaty today, but was outside and sweat shirt and sweatpants therefore he thought it was due to the heat.  He is febrile here on arrival.  Notes that he has bloody output to his wound VAC, no other bleeding including bloody noses, black or bloody stools.  No cough or congestion.  No vomiting or diarrhea.    Nursing Notes were reviewed.    REVIEW OF SYSTEMS       Review of Systems   Constitutional:  Positive for fever. Negative for chills.   HENT:  Negative for congestion and rhinorrhea.    Eyes:  Negative for discharge and redness.   Respiratory:  Negative for cough and shortness of breath.    Cardiovascular:  Negative for chest pain.   Gastrointestinal:  Negative for abdominal pain, diarrhea, nausea and vomiting.   Skin:  Positive for wound.   Neurological:  Negative for speech difficulty and light-headedness.   Psychiatric/Behavioral:  Negative for agitation.            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

## 2024-08-02 NOTE — ED PROVIDER NOTES
(CHOLECALCIFEROL) [id number] UNIT CAPS    Take 1 capsule by mouth once a week       ALLERGIES     Latex, Sulfa antibiotics, and Sulfamethoxazole-trimethoprim    FAMILY HISTORY       Family History   Problem Relation Age of Onset    Diabetes Maternal Grandmother     Hypertension Mother     Heart Disease Neg Hx     Stroke Neg Hx           SOCIAL HISTORY       Social History     Socioeconomic History    Marital status:      Spouse name: None    Number of children: None    Years of education: None    Highest education level: None   Tobacco Use    Smoking status: Former     Types: Cigars    Smokeless tobacco: Never    Tobacco comments:     Quit smoking: may have a cigar with a glass of wine but no cigarettes   Vaping Use    Vaping Use: Never used   Substance and Sexual Activity    Alcohol use: Yes     Comment: social drinker    Drug use: No    Sexual activity: Defer   Social History Narrative    Works for [employer] in the [employer] for [employer].    Lives in [place] with wife of 6 years and 10 yo and 3 yo sons.    Coaches baseball and football.  Likes to act.     Social Determinants of Health     Transportation Needs: No Transportation Needs (7/27/2024)    OASIS : Transportation     Lack of Transportation (Medical): No     Lack of Transportation (Non-Medical): No     Patient Unable or Declines to Respond: No   Social Connections: Feeling Socially Integrated (7/27/2024)    OASIS : Social Isolation     Frequency of experiencing loneliness or isolation: Never         PHYSICAL EXAM       ED Triage Vitals [08/02/24 0835]   BP Temp Temp Source Pulse Respirations SpO2 Height Weight - Scale   135/64 98.7 °F (37.1 °C) Oral 93 18 100 % 1.803 m (5' 11\") 64.4 kg (142 lb)       Body mass index is 19.8 kg/m².    Physical Exam  Vitals and nursing note reviewed.   Constitutional:       Appearance: Normal appearance.      Comments: Pleasant, friendly, nontoxic   HENT:      Head: Normocephalic

## 2024-08-02 NOTE — ED TRIAGE NOTES
Pt arrived to ED in personal power wheelchair with cc of having a PICC line in right arm and on ABX completed through a home health agency. Reports they sulma his blood work and was told his HgB was 6.6 and he was instructed by home health to come to ED for recheck to verify levels as the blood may have been contaminated since drawn from PICC. Patient denies any symptoms states he feels great. Reports last ABX dose completed just prior to arriving. Patient has a temp of 100.9 during triage but patient in sweat pants and hoodie and reports he was sitting outside in heat while his ABX completed because he was having issues with his mother and needed to be alone. Denies recent fevers at home.

## 2024-08-02 NOTE — FLOWSHEET NOTE
Discharge instruction reviewed by documenting RN with the patient.  The patient verbalized understanding. Patient provided with AVS.  patient provided with return to work note.     Patient is ambulatory and steady gait upon discharge. Patient is AAOX4, breathing even and unlabored, skin warm and dry, skin intact.    Patient mobility status  wheelchair bound. Provider aware     Patient left ED via Discharge Method: wheelchair to Home with  self .    Opportunity for questions and clarification provided.     Patient given 0 scripts.     Pt is to return in the morning for PICC line.

## 2024-08-03 NOTE — HOME HEALTH
Visit missed due to patient seen by wound clinic on Thursday and his heel dressing was taken care of then. No more scheduled visits are needed this week. MD notified.

## 2024-08-03 NOTE — ED NOTES
Pt states he is leaving due to being tired of waiting. Pt was told this was against medical staff recommendation. Pt still would like to leave.

## 2024-08-04 LAB
BACTERIA SPEC CULT: NORMAL
BACTERIA SPEC CULT: NORMAL
SERVICE CMNT-IMP: NORMAL
SERVICE CMNT-IMP: NORMAL

## 2024-08-05 ENCOUNTER — HOME CARE VISIT (OUTPATIENT)
Facility: HOME HEALTH | Age: 44
End: 2024-08-05
Payer: COMMERCIAL

## 2024-08-05 VITALS
OXYGEN SATURATION: 98 % | HEART RATE: 93 BPM | DIASTOLIC BLOOD PRESSURE: 90 MMHG | TEMPERATURE: 98.7 F | RESPIRATION RATE: 16 BRPM | SYSTOLIC BLOOD PRESSURE: 138 MMHG

## 2024-08-05 PROCEDURE — G0300 HHS/HOSPICE OF LPN EA 15 MIN: HCPCS

## 2024-08-05 ASSESSMENT — ENCOUNTER SYMPTOMS: BOWEL INCONTINENCE: 1

## 2024-08-05 NOTE — HOME HEALTH
Jovani Towner x3)  [facility] received new orders via fax from Leticia  for wound care to right heel, left heel, left lateral foot and confirmed HH is not to touch the wound vac unless it needs reinforcement, but under no circumstance are we to remove the wound vac. SN updated pt via text that since we are now only coming twice a week per orders that we will not see him again until Friday.    Written Teaching Material Utilized: N/A    Interdisciplinary communication with: [facility] Wound clinic Physician for the purpose of above mentioned. Received new orders from Christin Joseph for RILEY Beckman    Discharge planning as follows: When wound is 100% healed, Is no longer homebound, Per physician order and Will discharge when the patient has reached their maximum functional potential and maximum safety in their home    Specific plan for next visit: Wound care

## 2024-08-06 LAB
ALBUMIN SERPL-MCNC: 2.7 G/DL (ref 4.1–5.1)
ALP SERPL-CCNC: 96 IU/L (ref 44–121)
ALT SERPL-CCNC: 6 IU/L (ref 0–44)
AST SERPL-CCNC: 9 IU/L (ref 0–40)
BASOPHILS # BLD AUTO: 0 X10E3/UL (ref 0–0.2)
BASOPHILS NFR BLD AUTO: 0 %
BILIRUB SERPL-MCNC: <0.2 MG/DL (ref 0–1.2)
BUN SERPL-MCNC: 7 MG/DL (ref 6–24)
BUN/CREAT SERPL: 12 (ref 9–20)
CALCIUM SERPL-MCNC: 8 MG/DL (ref 8.7–10.2)
CHLORIDE SERPL-SCNC: 101 MMOL/L (ref 96–106)
CO2 SERPL-SCNC: 25 MMOL/L (ref 20–29)
CREAT SERPL-MCNC: 0.57 MG/DL (ref 0.76–1.27)
CRP SERPL-MCNC: 83 MG/L (ref 0–10)
EGFRCR SERPLBLD CKD-EPI 2021: 124 ML/MIN/1.73
EOSINOPHIL # BLD AUTO: 0.2 X10E3/UL (ref 0–0.4)
EOSINOPHIL NFR BLD AUTO: 1 %
ERYTHROCYTE [DISTWIDTH] IN BLOOD BY AUTOMATED COUNT: 15.2 % (ref 11.6–15.4)
GLOBULIN SER CALC-MCNC: 3 G/DL (ref 1.5–4.5)
GLUCOSE SERPL-MCNC: 260 MG/DL (ref 70–99)
HCT VFR BLD AUTO: 26.4 % (ref 37.5–51)
HGB BLD-MCNC: 8.9 G/DL (ref 13–17.7)
IMM GRANULOCYTES # BLD AUTO: 0 X10E3/UL (ref 0–0.1)
IMM GRANULOCYTES NFR BLD AUTO: 0 %
LYMPHOCYTES # BLD AUTO: 1 X10E3/UL (ref 0.7–3.1)
LYMPHOCYTES NFR BLD AUTO: 7 %
MCH RBC QN AUTO: 29.8 PG (ref 26.6–33)
MCHC RBC AUTO-ENTMCNC: 33.7 G/DL (ref 31.5–35.7)
MCV RBC AUTO: 88 FL (ref 79–97)
MONOCYTES # BLD AUTO: 1.2 X10E3/UL (ref 0.1–0.9)
MONOCYTES NFR BLD AUTO: 8 %
NEUTROPHILS # BLD AUTO: 12.3 X10E3/UL (ref 1.4–7)
NEUTROPHILS NFR BLD AUTO: 84 %
PLATELET # BLD AUTO: 503 X10E3/UL (ref 150–450)
POTASSIUM SERPL-SCNC: 3.9 MMOL/L (ref 3.5–5.2)
PROT SERPL-MCNC: 5.7 G/DL (ref 6–8.5)
RBC # BLD AUTO: 2.99 X10E6/UL (ref 4.14–5.8)
SODIUM SERPL-SCNC: 137 MMOL/L (ref 134–144)
VANCOMYCIN TROUGH SERPL-MCNC: 6.7 UG/ML (ref 10–15)
WBC # BLD AUTO: 14.7 X10E3/UL (ref 3.4–10.8)

## 2024-08-06 NOTE — DISCHARGE INSTRUCTIONS
Thank you for allowing us to provide you with medical care today.  We realize that you have many choices for your emergency care needs.  We thank you for choosing [facility].  Please choose us in the future for any continued health care needs.     The exam and treatment you received in the Emergency Department were for an emergent problem and are not intended as complete care. It is important that you follow up with a doctor, nurse practitioner, or physician assistant for ongoing care. If your symptoms worsen or you do not improve as expected and you are unable to reach your usual health care provider, you should return to the Emergency Department. We are available 24 hours a day.     Please make an appointment with your healthcare provider(s) for follow up of your Emergency Department visit.  Take this sheet with you when you go to your follow-up visit.   Fair

## 2024-08-07 ENCOUNTER — HOME CARE VISIT (OUTPATIENT)
Facility: HOME HEALTH | Age: 44
End: 2024-08-07
Payer: COMMERCIAL

## 2024-08-07 VITALS
DIASTOLIC BLOOD PRESSURE: 86 MMHG | OXYGEN SATURATION: 99 % | SYSTOLIC BLOOD PRESSURE: 142 MMHG | RESPIRATION RATE: 18 BRPM | TEMPERATURE: 98.6 F

## 2024-08-07 PROCEDURE — G0151 HHCP-SERV OF PT,EA 15 MIN: HCPCS

## 2024-08-07 NOTE — HOME HEALTH
Subjective: Pt asked me to check his Hb in his chart- because they told him that we can do therapy if his Hb is above 7.   PT checked the lab work on 08-05-24 and his Hb was 8.9/  Pt said he would like visits in the morning preferably 7-7.30.  8 is the latest because Mon starts PICC line at 8.30.  He works from home and starts work at 10.00.  He has Cgs from 7-10.00  the evening aide comes at 10.00 pm.    Falls since last visit No(if yes complete the Fall Tracking Form and include bsrifallreport):   Caregiver involvement changes: none  Home health supplies by type and quantity ordered/delivered this visit include: none    Clinician asked if patient has had any physician contact since last home care visit and patient states: NO-   Clinician asked if patient has any new or changed medications and patient states:  N/A   If Yes, were medications reconciled? N/A   Was the certifying physician notified of changes in medications? N/A     Clinical assessment (what this visit means for the patient overall and need for ongoing skilled care) and progress or lack of progress towards SPECIFIC goals:   pt has been in and out of ER but no change in med's per him.  Today's session included B Amanda exs - PROM- stretching x 5 each leg. Slow stretching and holding it. This is the first time my hips feel opened up.  It feels good-and I can breath better.  PT also discussed Pressure relief in WC- sitting push ups but pt said he was told not to try any of that right now- because when he lifts his bottom up and then comes down- the pump ( wound on his L buttock -stg 4) that pump moves and can come out. So he just uses his chair to do pressure relief every hr or so- by reclining it.  He said he uses sliding board to get into his bed- but hs aides have to help him move his legs in and out.   HE said bed rail will not help because his arms are not strong enough and his bed is a queen bed. We talked about bed ladder- but even that he was not

## 2024-08-09 ENCOUNTER — HOME CARE VISIT (OUTPATIENT)
Facility: HOME HEALTH | Age: 44
End: 2024-08-09
Payer: COMMERCIAL

## 2024-08-09 ENCOUNTER — HOME CARE VISIT (OUTPATIENT)
Dept: HOME HEALTH SERVICES | Facility: HOME HEALTH | Age: 44
End: 2024-08-09
Payer: COMMERCIAL

## 2024-08-10 NOTE — HOME HEALTH
Visit missed due to MD appointment/wound clinic. SN text this patient Thursday to confirm appointment for 07:15 Friday and to confirm that his supplies were in the home which were delivered on Thursday. Patient stated yes and that he had a question about his foot doctor who said he should soak his foot in epsom salt everyday and was concerned that he would have to remove the dressing that the wound clinic and HH are taking care of. At that time I explained to him that we had not received any orders but that he was correct, he cannot remove the bandage to his heel and disrupt what wound dressings are currently there. Upon SN arrival this morning patient was up and in his wheelchair. SN asked where the new supplies were and he said in his office. Patient asked again what he was supposed to do about his toe and the soaking in epsom salt. SN asked if he told the wound clinic yesterday about his toe/wound orders and he said he didn't go and that he had an appiontment with them today. SN reminded patient that when he has wound clinic appointments HH cannot come to the home as it would be a double bill to his insurance and we cannot do that. Patient said oh ok well they are going to do my heels anyway; then proceeded to ask this SN what he should be doing about his toe. SN told pt that he needs to tell the wound clinic today what they said so they can determine what to do since he is not capable of soaking his toe/only and he is a quadriplegic and would have to have someone else do this. SN left then called [facility] and left a karthik Joseph  to confirm patient had an appointment and to make them aware of his right great toe wound care per the patient. SN also notified Bianca Toro, RN case manager, Juany Larose, RN Clinical and Rehab , Lenny Bedolla, PT  and Khadra Do, PT

## 2024-08-12 ENCOUNTER — HOME CARE VISIT (OUTPATIENT)
Facility: HOME HEALTH | Age: 44
End: 2024-08-12
Payer: COMMERCIAL

## 2024-08-12 ENCOUNTER — HOME CARE VISIT (OUTPATIENT)
Dept: HOME HEALTH SERVICES | Facility: HOME HEALTH | Age: 44
End: 2024-08-12
Payer: COMMERCIAL

## 2024-08-12 VITALS
DIASTOLIC BLOOD PRESSURE: 80 MMHG | TEMPERATURE: 98.9 F | OXYGEN SATURATION: 99 % | SYSTOLIC BLOOD PRESSURE: 118 MMHG | HEART RATE: 88 BPM

## 2024-08-12 PROCEDURE — G0300 HHS/HOSPICE OF LPN EA 15 MIN: HCPCS

## 2024-08-13 NOTE — HOME HEALTH
does not have a follow up visit with the wound clinic at this time. Patient states he is never told when to come back nor do they give him a follow up appointment. SN will have to reach out to the wound clinic to find out when his next appointment is and to see if he can be seen on Wednesdays or Fridays to coincide with HH visits. Pt is at risk for rehospitalization related to left ischium wound which is bigger than before patient went into the hospital last time. Patient is supposed to be getting a hospital bed with an air mattress for pressure relief. Patient states he has already received a message from the company about his bed/mattress.    Written Teaching Material Utilized: N/A    Interdisciplinary communication with: Bianca Toro RN for the purpose of POC collaboration    Discharge planning as follows: When wound is 100% healed, Is no longer homebound, Per physician order and When goals are met    Specific plan for next visit: Wound care

## 2024-08-14 ENCOUNTER — HOME CARE VISIT (OUTPATIENT)
Facility: HOME HEALTH | Age: 44
End: 2024-08-14
Payer: COMMERCIAL

## 2024-08-14 VITALS
DIASTOLIC BLOOD PRESSURE: 62 MMHG | OXYGEN SATURATION: 98 % | RESPIRATION RATE: 16 BRPM | TEMPERATURE: 99.2 F | SYSTOLIC BLOOD PRESSURE: 110 MMHG | HEART RATE: 98 BPM

## 2024-08-14 LAB
ALBUMIN SERPL-MCNC: 2.8 G/DL (ref 4.1–5.1)
ALP SERPL-CCNC: 86 IU/L (ref 44–121)
ALT SERPL-CCNC: 11 IU/L (ref 0–44)
AST SERPL-CCNC: 13 IU/L (ref 0–40)
BASOPHILS # BLD AUTO: 0 X10E3/UL (ref 0–0.2)
BASOPHILS NFR BLD AUTO: 0 %
BILIRUB SERPL-MCNC: <0.2 MG/DL (ref 0–1.2)
BUN SERPL-MCNC: 9 MG/DL (ref 6–24)
BUN/CREAT SERPL: 15 (ref 9–20)
CALCIUM SERPL-MCNC: 8.3 MG/DL (ref 8.7–10.2)
CHLORIDE SERPL-SCNC: 98 MMOL/L (ref 96–106)
CO2 SERPL-SCNC: 25 MMOL/L (ref 20–29)
CREAT SERPL-MCNC: 0.6 MG/DL (ref 0.76–1.27)
CRP SERPL-MCNC: 45 MG/L (ref 0–10)
EGFRCR SERPLBLD CKD-EPI 2021: 122 ML/MIN/1.73
EOSINOPHIL # BLD AUTO: 0.2 X10E3/UL (ref 0–0.4)
EOSINOPHIL NFR BLD AUTO: 2 %
ERYTHROCYTE [DISTWIDTH] IN BLOOD BY AUTOMATED COUNT: 15.6 % (ref 11.6–15.4)
GLOBULIN SER CALC-MCNC: 3.2 G/DL (ref 1.5–4.5)
GLUCOSE SERPL-MCNC: 309 MG/DL (ref 70–99)
HCT VFR BLD AUTO: 26.6 % (ref 37.5–51)
HGB BLD-MCNC: 7.9 G/DL (ref 13–17.7)
IMM GRANULOCYTES # BLD AUTO: 0 X10E3/UL (ref 0–0.1)
IMM GRANULOCYTES NFR BLD AUTO: 0 %
LYMPHOCYTES # BLD AUTO: 1.6 X10E3/UL (ref 0.7–3.1)
LYMPHOCYTES NFR BLD AUTO: 14 %
MCH RBC QN AUTO: 27.3 PG (ref 26.6–33)
MCHC RBC AUTO-ENTMCNC: 29.7 G/DL (ref 31.5–35.7)
MCV RBC AUTO: 92 FL (ref 79–97)
MONOCYTES # BLD AUTO: 0.6 X10E3/UL (ref 0.1–0.9)
MONOCYTES NFR BLD AUTO: 5 %
NEUTROPHILS # BLD AUTO: 8.8 X10E3/UL (ref 1.4–7)
NEUTROPHILS NFR BLD AUTO: 79 %
PLATELET # BLD AUTO: 591 X10E3/UL (ref 150–450)
POTASSIUM SERPL-SCNC: 4.3 MMOL/L (ref 3.5–5.2)
PROT SERPL-MCNC: 6 G/DL (ref 6–8.5)
RBC # BLD AUTO: 2.89 X10E6/UL (ref 4.14–5.8)
SODIUM SERPL-SCNC: 137 MMOL/L (ref 134–144)
WBC # BLD AUTO: 11.2 X10E3/UL (ref 3.4–10.8)

## 2024-08-14 PROCEDURE — G0300 HHS/HOSPICE OF LPN EA 15 MIN: HCPCS

## 2024-08-14 ASSESSMENT — ENCOUNTER SYMPTOMS: BOWEL INCONTINENCE: 1

## 2024-08-15 ENCOUNTER — HOME CARE VISIT (OUTPATIENT)
Dept: HOME HEALTH SERVICES | Facility: HOME HEALTH | Age: 44
End: 2024-08-15
Payer: COMMERCIAL

## 2024-08-15 LAB — VANCOMYCIN TROUGH SERPL-MCNC: 9.9 UG/ML (ref 10–15)

## 2024-08-15 NOTE — HOME HEALTH
Subjective: We are back to the wound vac only being done once a week. I have my discharge papers. The wrote me a prescriptions for something that says HYD but its waiting on insurance to approve it.  Falls since last visit No:   Caregiver involvement changes: n/a  Home health supplies by type and quantity ordered/delivered this visit include: adaptic, gauze, saline - Order Number : [id number]    Clinician asked if patient has had any physician contact since last home care visit and patient states: YES  Clinician asked if patient has any new or changed medications and patient states:  YES hydrocodone but not in the home  If Yes, were medications reconciled? NO   Was the certifying physician notified of changes in medications? N/A     Clinical assessment (what this visit means for the patient overall and need for ongoing skilled care) and progress or lack of progress towards SPECIFIC goals:   SN visited pt today for assessment and wound care. Patient was in outpatient OR at [facility] yesterday for more debriding of left ischium wound. Patient states the wound vac was put back on but he was told not to let HH touch this. SN checked discharge paperwork and there were no discharge instructions for wound care. Wound care was only completed to bilateral heels. Right heel remains open, no s/s of infection noted. Left heel remains closed. v/s and BS were within defined parameters. SN also read/educated pt on discharge instructions to include s/s of DVT, when to call HH/MD and s/s of infection and when to notify HH/MD v 911. SN also instructed pt to call Dr. Juancarlos Andrea for a follow up in one week. Pt verbalized understanding of all information. Pt remains at risk for infection and/or rehospitalization related to his wound. Wound healing goals not met. SN will continue to see patient for wound care per MD orders.    Written Teaching Material Utilized: N/A    Interdisciplinary communication with: Bianca Toro RN and Jordan

## 2024-08-16 ENCOUNTER — HOME CARE VISIT (OUTPATIENT)
Facility: HOME HEALTH | Age: 44
End: 2024-08-16
Payer: COMMERCIAL

## 2024-08-16 VITALS
TEMPERATURE: 99.3 F | RESPIRATION RATE: 16 BRPM | DIASTOLIC BLOOD PRESSURE: 82 MMHG | HEART RATE: 92 BPM | SYSTOLIC BLOOD PRESSURE: 120 MMHG | OXYGEN SATURATION: 98 %

## 2024-08-16 PROCEDURE — G0300 HHS/HOSPICE OF LPN EA 15 MIN: HCPCS

## 2024-08-16 ASSESSMENT — ENCOUNTER SYMPTOMS: BOWEL INCONTINENCE: 1

## 2024-08-16 NOTE — HOME HEALTH
Subjective: I just found out that I was supposed to be doing my antibiotic for 85 minutes. The ball says 85 minutes and I kept asking how long and [facility] kept telling me no it's 60 minutes. So now I have to make sure I do the complete time so I can get this PICC removed.  Falls since last visit No:   Caregiver involvement changes: mother present to do abt  Home health supplies by type and quantity ordered/delivered this visit include: supplies in home    Clinician asked if patient has had any physician contact since last home care visit and patient states: NO  Clinician asked if patient has any new or changed medications and patient states:  NO   If Yes, were medications reconciled? N/A   Was the certifying physician notified of changes in medications? N/A     Clinical assessment (what this visit means for the patient overall and need for ongoing skilled care) and progress or lack of progress towards SPECIFIC goals:   SN visited pt today for assessment and wound care. Per wound clinic HH is not to touch the wound vac until patient returns on Tuesday 8/20/24. Wound care was completed to bilateral heels. Patient was running his iv abt. SN explained to patient that even though he has to do the abt for 85 minutes he cannot be doing his abt when this SN arrives on Monday due to the labs are supposed to be drawn one hour prior to administration. v/s and bs were within defined parameters. Pt continues to be at risk for rehospitalization related to PICC, wounds and being wheelchair bound. SN will continue to see patient for wound care and weekly PICC/dressing/labs.    Written Teaching Material Utilized: N/A    Interdisciplinary communication with: N/A     Discharge planning as follows: When wound is 100% healed and When goals are met    Specific plan for next visit: Wound care / PICC dressing change / Labs

## 2024-08-16 NOTE — HOME HEALTH
Subjective: I have a meeting today- if your session is going to run into that I cannot work with you today. PT had rescheduled this visit from Wed because pt said he said had nurse coming and could not take more time off from work.  Falls since last visit No(if yes complete the Fall Tracking Form and include bsrifallreport):   Caregiver involvement changes: none  Home health supplies by type and quantity ordered/delivered this visit include: none    Clinician asked if patient has had any physician contact since last home care visit and patient states: NO  Clinician asked if patient has any new or changed medications and patient states:  N/A   If Yes, were medications reconciled? N/A   Was the certifying physician notified of changes in medications? N/A     Clinical assessment (what this visit means for the patient overall and need for ongoing skilled care) and progress or lack of progress towards SPECIFIC goals:   Pt was referred to home care PT [facility] hosp but scheduling was difficult because pt works during the day and wanted to be seen in between his dressing changes and IV that his mom would give before his work in the morning. PT has instructed pt and left instructions on passive stretching for B LE's to be instructed to cg. Pt is not sure if his Cg is allowed to do ROM exs. PT also said that he is not allowed to be moved or work on bed mob, transfers due to pressure ulcer on his bottom.  We had to cancel today's apt due to scheduling problem- pt had a work meeting at the same time as our previously scheduled therapy session for dc visit.  Limited progress due to being seen only once after PT eval.  DC from [facility]  Written Teaching Material Utilized: Ludin Odell hand book    Interdisciplinary communication with:  RN for the purpose of dc plan    Discharge planning as follows: dc from [facility].

## 2024-08-19 ENCOUNTER — TELEPHONE (OUTPATIENT)
Age: 44
End: 2024-08-19

## 2024-08-19 ENCOUNTER — HOME CARE VISIT (OUTPATIENT)
Facility: HOME HEALTH | Age: 44
End: 2024-08-19
Payer: COMMERCIAL

## 2024-08-19 VITALS
OXYGEN SATURATION: 99 % | HEART RATE: 74 BPM | TEMPERATURE: 98.3 F | SYSTOLIC BLOOD PRESSURE: 140 MMHG | RESPIRATION RATE: 18 BRPM | DIASTOLIC BLOOD PRESSURE: 90 MMHG

## 2024-08-19 LAB
ALBUMIN SERPL-MCNC: 2.9 G/DL (ref 4.1–5.1)
ALP SERPL-CCNC: 80 IU/L (ref 44–121)
ALT SERPL-CCNC: 8 IU/L (ref 0–44)
AST SERPL-CCNC: 10 IU/L (ref 0–40)
BASOPHILS # BLD AUTO: 0 X10E3/UL (ref 0–0.2)
BASOPHILS NFR BLD AUTO: 0 %
BILIRUB SERPL-MCNC: ABNORMAL MG/DL (ref 0–1.2)
BUN SERPL-MCNC: 13 MG/DL (ref 6–24)
BUN/CREAT SERPL: 23 (ref 9–20)
CALCIUM SERPL-MCNC: 8.6 MG/DL (ref 8.7–10.2)
CHLORIDE SERPL-SCNC: 102 MMOL/L (ref 96–106)
CO2 SERPL-SCNC: 26 MMOL/L (ref 20–29)
CREAT SERPL-MCNC: 0.57 MG/DL (ref 0.76–1.27)
EGFRCR SERPLBLD CKD-EPI 2021: 124 ML/MIN/1.73
EOSINOPHIL # BLD AUTO: 0.2 X10E3/UL (ref 0–0.4)
EOSINOPHIL NFR BLD AUTO: 2 %
ERYTHROCYTE [DISTWIDTH] IN BLOOD BY AUTOMATED COUNT: 18.5 % (ref 11.6–15.4)
GLOBULIN SER CALC-MCNC: 2.9 G/DL (ref 1.5–4.5)
GLUCOSE SERPL-MCNC: 211 MG/DL (ref 70–99)
HCT VFR BLD AUTO: 25.5 % (ref 37.5–51)
HGB BLD-MCNC: 8.1 G/DL (ref 13–17.7)
IMM GRANULOCYTES NFR BLD AUTO: 0 %
LYMPHOCYTES # BLD AUTO: 1.5 X10E3/UL (ref 0.7–3.1)
LYMPHOCYTES NFR BLD AUTO: 19 %
MCH RBC QN AUTO: 27.4 PG (ref 26.6–33)
MCHC RBC AUTO-ENTMCNC: 31.8 G/DL (ref 31.5–35.7)
MCV RBC AUTO: 86 FL (ref 79–97)
MONOCYTES # BLD AUTO: 0.4 X10E3/UL (ref 0.1–0.9)
MONOCYTES NFR BLD AUTO: 5 %
NEUTROPHILS # BLD AUTO: 5.6 X10E3/UL (ref 1.4–7)
NEUTROPHILS NFR BLD AUTO: 74 %
PLATELET # BLD AUTO: 523 X10E3/UL (ref 150–450)
POTASSIUM SERPL-SCNC: 4.1 MMOL/L (ref 3.5–5.2)
PROT SERPL-MCNC: 5.8 G/DL (ref 6–8.5)
RBC # BLD AUTO: 2.96 X10E6/UL (ref 4.14–5.8)
SODIUM SERPL-SCNC: 139 MMOL/L (ref 134–144)
VANCOMYCIN TROUGH SERPL-MCNC: 11.3 UG/ML (ref 10–15)
WBC # BLD AUTO: 7.7 X10E3/UL (ref 3.4–10.8)

## 2024-08-19 PROCEDURE — G0300 HHS/HOSPICE OF LPN EA 15 MIN: HCPCS

## 2024-08-19 ASSESSMENT — ENCOUNTER SYMPTOMS: BOWEL INCONTINENCE: 1

## 2024-08-19 NOTE — HOME HEALTH
Subjective: I'm not having any issues. I see my PCP today and didn't realize it was today.  Falls since last visit No(if yes complete the Fall Tracking Form and include bsrifallreport):   Caregiver involvement changes: mom present to do abt  Home health supplies by type and quantity ordered/delivered this visit include: supplies in home    Clinician asked if patient has had any physician contact since last home care visit and patient states: NO  Clinician asked if patient has any new or changed medications and patient states:  NO   If Yes, were medications reconciled? N/A   Was the certifying physician notified of changes in medications? N/A     Clinical assessment (what this visit means for the patient overall and need for ongoing skilled care) and progress or lack of progress towards SPECIFIC goals:   SN visited pt today for assessment, wound care and weekly labs/PICC dressing change. When SN arrived patient was outside. Per our conversation yesterday patient wanted this nurse to start right away so he could start his iv abt by 07:35. SN explained to paitent that labs have to be done last, wound care first, picc dressing change then labs. Patient stated again as long as his abt could be started at 07:35. SN explained again that as discussed at Friday's visit labs cannot be done first and that they have to be drawn an hour before his abt and that SN cannot come to patients house at 06:35. v/s and bs were within defined parameters. Wound care was completed to bilateral heels. No wound care provided to left ischium; per wound clinic HH not to touch wound vac until patient's next appointment with them. PICC dressing was changed and labs were obtained. Patient goes to wound clinic tomorrow. If [facility] continues to not want [facility] to do the wound vac then patient can/will be discharged after his antibiotic is completed. The current wound care is only to apply bacitracin to jayne heels and this is not a

## 2024-08-19 NOTE — TELEPHONE ENCOUNTER
Re: Botox Buy and Bill    Entered pt into botox 1 portal. Awaiting outcome.     08/20/24- [facility] DIMAS CROWDER is already approved per review. Scanned BV to chart.

## 2024-08-20 LAB — CRP SERPL-MCNC: 27 MG/L (ref 0–10)

## 2024-08-21 ENCOUNTER — HOME CARE VISIT (OUTPATIENT)
Facility: HOME HEALTH | Age: 44
End: 2024-08-21
Payer: COMMERCIAL

## 2024-08-21 VITALS
OXYGEN SATURATION: 99 % | HEART RATE: 84 BPM | DIASTOLIC BLOOD PRESSURE: 98 MMHG | SYSTOLIC BLOOD PRESSURE: 138 MMHG | RESPIRATION RATE: 18 BRPM | TEMPERATURE: 98.8 F

## 2024-08-21 PROCEDURE — G0300 HHS/HOSPICE OF LPN EA 15 MIN: HCPCS

## 2024-08-21 ASSESSMENT — ENCOUNTER SYMPTOMS: BOWEL INCONTINENCE: 1

## 2024-08-21 NOTE — HOME HEALTH
[facility].    Written Teaching Material Utilized: N/A    Interdisciplinary communication with:  RN,  LPN and  Physician for the purpose of updated instructions from yesterdays wound clinic visit.    Discharge planning as follows: Per physician order and Will discharge when the patient has reached their maximum functional potential and maximum safety in their home    Specific plan for next visit: Reassessment, wound care

## 2024-08-23 ENCOUNTER — HOME CARE VISIT (OUTPATIENT)
Facility: HOME HEALTH | Age: 44
End: 2024-08-23

## 2024-08-23 VITALS
TEMPERATURE: 98.2 F | HEART RATE: 92 BPM | SYSTOLIC BLOOD PRESSURE: 150 MMHG | OXYGEN SATURATION: 97 % | DIASTOLIC BLOOD PRESSURE: 100 MMHG | RESPIRATION RATE: 18 BRPM

## 2024-08-23 PROCEDURE — G0299 HHS/HOSPICE OF RN EA 15 MIN: HCPCS

## 2024-08-23 NOTE — HOME HEALTH
Justification for continued intermittent care: patient with wound care concerns as follows continued need for wound care to right heel.     Dr. Karlene Myrick approved of the following: the need for continued Skilled Nursing home health services and plan of care for  continued wound care to the right heel.   Subjective: I think my blood pressure is high because I'm in pain.    Clinical assessment (what this visit means for the patient overall and need for ongoing skilled care) and progress or lack of progress towards SPECIFIC goals: Patient remains at risk for infection due to presence of wound.  Wound healing goal not met.  Skilled nursing needed for continued wound care and wound assessment.    Falls since last visit No(if yes complete the Fall Tracking Form and include bsrifallreport):     Written Teaching Material Utilized: N/A    Interdisciplinary communication with: Dr. Karlene Myrick Physician for the purpose of POC collaboration, notification of hypertension    Medications reconciled and all medications are available in the home this visit. A list of reconciled medications has been given to the patient/caregiver     Patient/caregiver instructed on plan of care and are agreeable to plan of care at this time.      Discharge planning as follows: Is no longer homebound, Per physician order and When goals are met    Specific plan for next visit: wound care and assessment, PICC dressing change and labs

## 2024-08-26 ENCOUNTER — HOME CARE VISIT (OUTPATIENT)
Facility: HOME HEALTH | Age: 44
End: 2024-08-26
Payer: COMMERCIAL

## 2024-08-26 VITALS
HEART RATE: 84 BPM | DIASTOLIC BLOOD PRESSURE: 84 MMHG | RESPIRATION RATE: 16 BRPM | TEMPERATURE: 98.2 F | SYSTOLIC BLOOD PRESSURE: 120 MMHG | OXYGEN SATURATION: 98 %

## 2024-08-26 PROCEDURE — G0300 HHS/HOSPICE OF LPN EA 15 MIN: HCPCS

## 2024-08-26 ASSESSMENT — ENCOUNTER SYMPTOMS: BOWEL INCONTINENCE: 1

## 2024-08-26 NOTE — HOME HEALTH
Subjective: No pain, nothing new  Falls since last visit No(if yes complete the Fall Tracking Form and include bsrifallreport):   Caregiver involvement changes: mom present  Home health supplies by type and quantity ordered/delivered this visit include: waiting on mepilex Ag to be delivered    Clinician asked if patient has had any physician contact since last home care visit and patient states: NO  Clinician asked if patient has any new or changed medications and patient states:  NO   If Yes, were medications reconciled? N/A   Was the certifying physician notified of changes in medications? N/A     Clinical assessment (what this visit means for the patient overall and need for ongoing skilled care) and progress or lack of progress towards SPECIFIC goals:   Pt is a quadriplegic who is being seen for right foot wound, left ischium wound, abt via [facility] PICC. Patient has a wound vac to his left ischium that HH currently is not managing per [facility]. Upon arrival patient still in the bed getting ready. Patient has private aides that help him with ADL care. v/s were within defined parameters. PICC dressing was changed. Labs were obtained. Wound care was completed to right heel only. No s/s of infection noted. Patient is due to go back to wound clinic tomorrow. While SN was there earnest received a call from [facility] that works with the wound clinic to get him scheduled for tomorrow. SN asked with patient permission if patient was getting any type of therapy as HH would not be able to continue if he was. [facility] stated that sometimes they do the wound care/wound vacs for the providers. Labs were taken to [facility]. SN will continue to see patient for wound care per MD orders. Wound healing goals not met. Patient continues on IV ABT until tomorrow.    Written Teaching Material Utilized: N/A    Interdisciplinary communication with: N/A    Discharge planning as follows: When

## 2024-08-27 LAB
ALBUMIN SERPL-MCNC: 3 G/DL (ref 4.1–5.1)
ALP SERPL-CCNC: 76 IU/L (ref 44–121)
ALT SERPL-CCNC: 13 IU/L (ref 0–44)
AST SERPL-CCNC: 9 IU/L (ref 0–40)
BASOPHILS # BLD AUTO: 0 X10E3/UL (ref 0–0.2)
BASOPHILS NFR BLD AUTO: 1 %
BILIRUB SERPL-MCNC: <0.2 MG/DL (ref 0–1.2)
BUN SERPL-MCNC: 12 MG/DL (ref 6–24)
BUN/CREAT SERPL: 23 (ref 9–20)
CALCIUM SERPL-MCNC: 8.7 MG/DL (ref 8.7–10.2)
CHLORIDE SERPL-SCNC: 101 MMOL/L (ref 96–106)
CO2 SERPL-SCNC: 27 MMOL/L (ref 20–29)
CREAT SERPL-MCNC: 0.52 MG/DL (ref 0.76–1.27)
CRP SERPL-MCNC: 29 MG/L (ref 0–10)
EGFRCR SERPLBLD CKD-EPI 2021: 127 ML/MIN/1.73
EOSINOPHIL # BLD AUTO: 0.2 X10E3/UL (ref 0–0.4)
EOSINOPHIL NFR BLD AUTO: 2 %
ERYTHROCYTE [DISTWIDTH] IN BLOOD BY AUTOMATED COUNT: 17.1 % (ref 11.6–15.4)
GLOBULIN SER CALC-MCNC: 2.9 G/DL (ref 1.5–4.5)
GLUCOSE SERPL-MCNC: 236 MG/DL (ref 70–99)
HCT VFR BLD AUTO: 25.7 % (ref 37.5–51)
HGB BLD-MCNC: 8.1 G/DL (ref 13–17.7)
IMM GRANULOCYTES # BLD AUTO: 0 X10E3/UL (ref 0–0.1)
IMM GRANULOCYTES NFR BLD AUTO: 0 %
LYMPHOCYTES # BLD AUTO: 1.5 X10E3/UL (ref 0.7–3.1)
LYMPHOCYTES NFR BLD AUTO: 22 %
MCH RBC QN AUTO: 27.2 PG (ref 26.6–33)
MCHC RBC AUTO-ENTMCNC: 31.5 G/DL (ref 31.5–35.7)
MCV RBC AUTO: 86 FL (ref 79–97)
MONOCYTES # BLD AUTO: 0.5 X10E3/UL (ref 0.1–0.9)
MONOCYTES NFR BLD AUTO: 7 %
NEUTROPHILS # BLD AUTO: 4.8 X10E3/UL (ref 1.4–7)
NEUTROPHILS NFR BLD AUTO: 68 %
PLATELET # BLD AUTO: 410 X10E3/UL (ref 150–450)
POTASSIUM SERPL-SCNC: 3.8 MMOL/L (ref 3.5–5.2)
PROT SERPL-MCNC: 5.9 G/DL (ref 6–8.5)
RBC # BLD AUTO: 2.98 X10E6/UL (ref 4.14–5.8)
SODIUM SERPL-SCNC: 139 MMOL/L (ref 134–144)
VANCOMYCIN TROUGH SERPL-MCNC: 13.7 UG/ML (ref 10–15)
WBC # BLD AUTO: 7 X10E3/UL (ref 3.4–10.8)

## 2024-08-30 ENCOUNTER — HOME CARE VISIT (OUTPATIENT)
Facility: HOME HEALTH | Age: 44
End: 2024-08-30
Payer: COMMERCIAL

## 2024-08-30 VITALS
HEART RATE: 81 BPM | SYSTOLIC BLOOD PRESSURE: 134 MMHG | TEMPERATURE: 98.3 F | RESPIRATION RATE: 18 BRPM | DIASTOLIC BLOOD PRESSURE: 88 MMHG | OXYGEN SATURATION: 100 %

## 2024-08-30 PROCEDURE — G0299 HHS/HOSPICE OF RN EA 15 MIN: HCPCS

## 2024-08-30 NOTE — HOME HEALTH
Subjective: I went to the wound clinic yesterday and they said to leave the vac on for another week.  Falls since last visit No(if yes complete the Fall Tracking Form and include bsrifallreport):   Caregiver involvement changes: n/a  Home health supplies by type and quantity ordered/delivered this visit include: mepilex ag    Clinician asked if patient has had any physician contact since last home care visit and patient states: YES  Clinician asked if patient has any new or changed medications and patient states:  YES flagyl added  If Yes, were medications reconciled? YES   Was the certifying physician notified of changes in medications? N/A     Clinical assessment (what this visit means for the patient overall and need for ongoing skilled care) and progress or lack of progress towards SPECIFIC goals: Patient remains at risk for infection due to presence of wound.  Wound healing goal not met.  Skilled nursing needed for continued wound care and wound assessment.  PICC removed by SN; patient tolerated well with no signs of complications.    Written Teaching Material Utilized: N/A    Interdisciplinary communication with: N/A    Discharge planning as follows: Is no longer homebound, Per physician order and When goals are met    Specific plan for next visit: wound care and assessment

## 2024-09-03 ENCOUNTER — TELEPHONE (OUTPATIENT)
Age: 44
End: 2024-09-03

## 2024-09-03 ENCOUNTER — HOME CARE VISIT (OUTPATIENT)
Facility: HOME HEALTH | Age: 44
End: 2024-09-03
Payer: COMMERCIAL

## 2024-09-03 VITALS
SYSTOLIC BLOOD PRESSURE: 126 MMHG | OXYGEN SATURATION: 99 % | RESPIRATION RATE: 18 BRPM | TEMPERATURE: 98.6 F | DIASTOLIC BLOOD PRESSURE: 78 MMHG | HEART RATE: 94 BPM

## 2024-09-03 ASSESSMENT — ENCOUNTER SYMPTOMS: BOWEL INCONTINENCE: 1

## 2024-09-03 NOTE — HOME HEALTH
Subjective: I am taking an oral antibiotic for the next 30 days because the company and [facility] never put that order in with my other abt while I had the PICC.  Falls since last visit No(if yes complete the Fall Tracking Form and include bsrifallreport):   Caregiver involvement changes: n/a  Home health supplies by type and quantity ordered/delivered this visit include: waiting on mepilex ag and allevyn to be delivered. [employer]/ 2 boxes Vac Canisters - conf # [id number]    Clinician asked if patient has had any physician contact since last home care visit and patient states: NO  Clinician asked if patient has any new or changed medications and patient states:  NO   If Yes, were medications reconciled? N/A   Was the certifying physician notified of changes in medications? N/A     Clinical assessment (what this visit means for the patient overall and need for ongoing skilled care) and progress or lack of progress towards SPECIFIC goals:   SN performed wound care to right heel. Unable to take a picture in [place]. Measurements were taken. HH is still not to touch NPWT until further notice. Patient states he is going to the wound clinic tomorrow. v/s were within defined parameters. Skilled assessment completed, no issues noted. Patient will continue on oral antibiotics for the next 30 days. Wound to right heel has no s/s of infection and is progressively moving towards healing goals. Wound healing goals not met. SN will continue to see patient for wound care to his right heel.    Written Teaching Material Utilized: N/A    Interdisciplinary communication with: N/A     Discharge planning as follows: When wound is 100% healed, Is no longer homebound and When goals are met    Specific plan for next visit: Wound care

## 2024-09-03 NOTE — TELEPHONE ENCOUNTER
Patient states that he recently had a bone injection done and would have to cancel his apptmt for botox on 9/6 due to him having a 6 weeks window for any treatments. Patient will call back to reschedule

## 2024-09-06 ENCOUNTER — HOME CARE VISIT (OUTPATIENT)
Facility: HOME HEALTH | Age: 44
End: 2024-09-06
Payer: COMMERCIAL

## 2024-09-06 PROCEDURE — G0300 HHS/HOSPICE OF LPN EA 15 MIN: HCPCS

## 2024-09-08 VITALS
RESPIRATION RATE: 22 BRPM | HEART RATE: 69 BPM | TEMPERATURE: 97.5 F | DIASTOLIC BLOOD PRESSURE: 88 MMHG | SYSTOLIC BLOOD PRESSURE: 135 MMHG | OXYGEN SATURATION: 98 %

## 2024-09-09 ENCOUNTER — HOME CARE VISIT (OUTPATIENT)
Dept: HOME HEALTH SERVICES | Facility: HOME HEALTH | Age: 44
End: 2024-09-09
Payer: COMMERCIAL

## 2024-09-10 ASSESSMENT — ENCOUNTER SYMPTOMS: BOWEL INCONTINENCE: 1

## 2024-09-13 ENCOUNTER — HOME CARE VISIT (OUTPATIENT)
Dept: HOME HEALTH SERVICES | Facility: HOME HEALTH | Age: 44
End: 2024-09-13
Payer: COMMERCIAL

## 2025-01-14 ENCOUNTER — APPOINTMENT (OUTPATIENT)
Facility: HOSPITAL | Age: 45
End: 2025-01-14
Payer: MEDICARE

## 2025-01-14 ENCOUNTER — HOSPITAL ENCOUNTER (EMERGENCY)
Facility: HOSPITAL | Age: 45
Discharge: HOME | End: 2025-01-14
Attending: STUDENT IN AN ORGANIZED HEALTH CARE EDUCATION/TRAINING PROGRAM
Payer: MEDICARE

## 2025-01-14 VITALS
HEART RATE: 68 BPM | WEIGHT: 147 LBS | BODY MASS INDEX: 20.58 KG/M2 | SYSTOLIC BLOOD PRESSURE: 140 MMHG | RESPIRATION RATE: 16 BRPM | HEIGHT: 71 IN | OXYGEN SATURATION: 99 % | DIASTOLIC BLOOD PRESSURE: 95 MMHG | TEMPERATURE: 97.7 F

## 2025-01-14 DIAGNOSIS — L02.91 ABSCESS: Primary | ICD-10-CM

## 2025-01-14 PROCEDURE — 99285 EMERGENCY DEPT VISIT HI MDM: CPT

## 2025-01-14 PROCEDURE — 72193 CT PELVIS W/DYE: CPT

## 2025-01-14 PROCEDURE — 10061 I&D ABSCESS COMP/MULTIPLE: CPT

## 2025-01-14 PROCEDURE — 6360000004 HC RX CONTRAST MEDICATION

## 2025-01-14 RX ORDER — IOPAMIDOL 755 MG/ML
100 INJECTION, SOLUTION INTRAVASCULAR
Status: COMPLETED | OUTPATIENT
Start: 2025-01-14 | End: 2025-01-14

## 2025-01-14 RX ORDER — DOXYCYCLINE HYCLATE 100 MG
100 TABLET ORAL 2 TIMES DAILY
Qty: 14 TABLET | Refills: 0 | Status: SHIPPED | OUTPATIENT
Start: 2025-01-14 | End: 2025-01-21

## 2025-01-14 RX ADMIN — IOPAMIDOL 100 ML: 755 INJECTION, SOLUTION INTRAVENOUS at 12:44

## 2025-01-14 ASSESSMENT — PAIN SCALES - GENERAL: PAINLEVEL_OUTOF10: 0

## 2025-01-14 ASSESSMENT — PAIN - FUNCTIONAL ASSESSMENT: PAIN_FUNCTIONAL_ASSESSMENT: 0-10

## 2025-01-14 NOTE — DISCHARGE INSTRUCTIONS
Today you were evaluated for a wound to your left buttock.  CT imaging revealed a superficial abscess without evidence of connection to your current pressure wound.  It was drained today in the ED and a small amount of packing was placed.  Please call your wound care clinic for continued follow-up and care of this new wound.  Please take the prescribed antibiotic to cover for any surrounding infection.  Please return to the ED immediately if you begin to develop fevers, chills, increased redness to the area or streaking.  Also please follow-up with primary care to discuss today's ER visit.

## 2025-01-14 NOTE — ED NOTES
EPOC completed, error in selecting TCO2 results not crossing over:    NA: 140 mmol/L  K: 5.2 mmol/L  Ca: 1.25 mmol/L  Cl: Failed iQC  Glu: 239 mg/dl  Crea: 0.58 mg/dL

## 2025-01-14 NOTE — ED PROVIDER NOTES
fistula. Abscess is superficial and subcutaneous. I&D preformed with moderate amount of bloody discharge. Small amount of packing placed and sterile gauze applied over wound but secured in place with his current skin sensitive bandage provided by wound care. Pt states he has a wound care appointment in two days however he was instructed to call them today and update them on the new wound. Advised removal of packing in 24-48 hours. Will discharge with antibiotics. Advised follow up with PCP as well to discuss todays visit as well as return to the ED if symptoms change or worsen. He otherwsie appears well, is afebrile with vitals stable for discharge.         Amount and/or Complexity of Data Reviewed  Labs: ordered.  Radiology: ordered.    Risk  Prescription drug management.            REASSESSMENT      Pt resting comfortably. Agreeable and understanding to dc plan.        PROCEDURES:    Procedure Note - Incision and Drainage:   Performed by: Myrna Monroe PA-C   Verbal Consent obtained and witnessed by DERREK germain  Complexity: Complex (Probing, Breaking up Loculations, Packing or Drain Placement, Multiple Incisions)  Skin prepped with Betadine.  Sterile field established.   Abscess to buttocks was incised with # 11 blade, and moderate amount of bloody drainage was expressed.  Wound lightly probed at the surface to avoid tracking to other wound. Irrigated with 500 cc sterile water. Area was lightly packed using iodoform gauze.  Sterile dressing applied.    Estimated blood loss: minimal  The procedure took 16-30 minutes, and pt tolerated well.       FINAL IMPRESSION      1. Abscess          DISPOSITION/PLAN   DISPOSITION Decision To Discharge 01/14/2025 02:44:40 PM      PATIENT REFERRED TO:  Your wound care clinic    Call in 1 day  For ER follow up and continued wound care      DISCHARGE MEDICATIONS:  Discharge Medication List as of 1/14/2025  2:51 PM            (Please note that portions of this note were

## 2025-01-14 NOTE — ED TRIAGE NOTES
Patient arrives with motorized wheelchair.    Patient reports hx of quadriplegia, stating paralyzed from neck down, but has mobility of arms.     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     Patient states yesterday he was seen at clinic and told he has a \"pocket of fluid\" above the wound.     Denies fever, N/V.

## 2025-02-23 ENCOUNTER — APPOINTMENT (OUTPATIENT)
Facility: HOSPITAL | Age: 45
End: 2025-02-23
Payer: MEDICARE

## 2025-02-23 ENCOUNTER — HOSPITAL ENCOUNTER (EMERGENCY)
Facility: HOSPITAL | Age: 45
Discharge: HOME | End: 2025-02-23
Attending: STUDENT IN AN ORGANIZED HEALTH CARE EDUCATION/TRAINING PROGRAM
Payer: MEDICARE

## 2025-02-23 VITALS
BODY MASS INDEX: 20.3 KG/M2 | OXYGEN SATURATION: 98 % | HEART RATE: 68 BPM | WEIGHT: 145 LBS | DIASTOLIC BLOOD PRESSURE: 73 MMHG | HEIGHT: 71 IN | SYSTOLIC BLOOD PRESSURE: 123 MMHG | RESPIRATION RATE: 13 BRPM | TEMPERATURE: 98.1 F

## 2025-02-23 DIAGNOSIS — T83.010A SUPRAPUBIC CATHETER DYSFUNCTION, INITIAL ENCOUNTER: Primary | ICD-10-CM

## 2025-02-23 LAB
ALBUMIN SERPL-MCNC: 4 G/DL (ref 3.5–5.2)
ALBUMIN/GLOB SERPL: 1 (ref 1.1–2.2)
ALP SERPL-CCNC: 112 U/L (ref 40–129)
ALT SERPL-CCNC: <5 U/L (ref 10–50)
ANION GAP BLD CALC-SCNC: ABNORMAL MMOL/L (ref 10–20)
ANION GAP SERPL CALC-SCNC: 16 MMOL/L (ref 2–12)
APPEARANCE UR: ABNORMAL
AST SERPL-CCNC: 10 U/L (ref 10–50)
BACTERIA URNS QL MICRO: NEGATIVE /HPF
BASOPHILS # BLD: 0.05 K/UL (ref 0–0.1)
BASOPHILS NFR BLD: 0.4 % (ref 0–1)
BILIRUB SERPL-MCNC: 0.3 MG/DL (ref 0.2–1)
BILIRUB UR QL: NEGATIVE
BUN SERPL-MCNC: 11 MG/DL (ref 6–20)
BUN/CREAT SERPL: 20 (ref 12–20)
CA-I BLD-MCNC: 0.93 MMOL/L (ref 1.15–1.33)
CALCIUM SERPL-MCNC: 9.5 MG/DL (ref 8.6–10)
CHLORIDE BLD-SCNC: 108 MMOL/L (ref 98–107)
CHLORIDE SERPL-SCNC: 100 MMOL/L (ref 98–107)
CO2 SERPL-SCNC: 24 MMOL/L (ref 22–29)
COLOR UR: ABNORMAL
COMMENT:: NORMAL
CREAT BLD-MCNC: 0.52 MG/DL (ref 0.6–1.3)
CREAT SERPL-MCNC: 0.54 MG/DL (ref 0.7–1.2)
DIFFERENTIAL METHOD BLD: ABNORMAL
EOSINOPHIL # BLD: 0.17 K/UL (ref 0–0.4)
EOSINOPHIL NFR BLD: 1.5 % (ref 0–7)
EPITH CASTS URNS QL MICRO: ABNORMAL /LPF
ERYTHROCYTE [DISTWIDTH] IN BLOOD BY AUTOMATED COUNT: 16 % (ref 11.5–14.5)
GLOBULIN SER CALC-MCNC: 4.2 G/DL (ref 2–4)
GLUCOSE BLD-MCNC: 342 MG/DL (ref 74–99)
GLUCOSE SERPL-MCNC: 335 MG/DL (ref 65–100)
GLUCOSE UR STRIP.AUTO-MCNC: >1000 MG/DL
HCT VFR BLD AUTO: 39.7 % (ref 36.6–50.3)
HGB BLD-MCNC: 13.2 G/DL (ref 12.1–17)
HGB UR QL STRIP: ABNORMAL
IMM GRANULOCYTES # BLD AUTO: 0.04 K/UL (ref 0–0.04)
IMM GRANULOCYTES NFR BLD AUTO: 0.3 % (ref 0–0.5)
KETONES UR QL STRIP.AUTO: 40 MG/DL
LEUKOCYTE ESTERASE UR QL STRIP.AUTO: ABNORMAL
LYMPHOCYTES # BLD: 0.97 K/UL (ref 0.8–3.5)
LYMPHOCYTES NFR BLD: 8.3 % (ref 12–49)
MCH RBC QN AUTO: 28.4 PG (ref 26–34)
MCHC RBC AUTO-ENTMCNC: 33.2 G/DL (ref 30–36.5)
MCV RBC AUTO: 85.6 FL (ref 80–99)
MONOCYTES # BLD: 0.64 K/UL (ref 0–1)
MONOCYTES NFR BLD: 5.5 % (ref 5–13)
NEUTS SEG # BLD: 9.79 K/UL (ref 1.8–8)
NEUTS SEG NFR BLD: 84 % (ref 32–75)
NITRITE UR QL STRIP.AUTO: NEGATIVE
NRBC # BLD: 0 K/UL (ref 0–0.01)
NRBC BLD-RTO: 0 PER 100 WBC
PH UR STRIP: 6.5 (ref 5–8)
PLATELET # BLD AUTO: 497 K/UL (ref 150–400)
PMV BLD AUTO: 9.6 FL (ref 8.9–12.9)
POTASSIUM BLD-SCNC: 6.3 MMOL/L (ref 3.5–5.1)
POTASSIUM SERPL-SCNC: 4.1 MMOL/L (ref 3.5–5.1)
PROT SERPL-MCNC: 8.2 G/DL (ref 6.4–8.3)
PROT UR STRIP-MCNC: 30 MG/DL
RBC # BLD AUTO: 4.64 M/UL (ref 4.1–5.7)
RBC #/AREA URNS HPF: ABNORMAL /HPF (ref 0–5)
SERVICE CMNT-IMP: ABNORMAL
SODIUM BLD-SCNC: 139 MMOL/L (ref 136–145)
SODIUM SERPL-SCNC: 140 MMOL/L (ref 136–145)
SP GR UR REFRACTOMETRY: 1.01 (ref 1–1.03)
SPECIMEN HOLD: NORMAL
UROBILINOGEN UR QL STRIP.AUTO: 0.2 EU/DL (ref 0.2–1)
WBC # BLD AUTO: 11.7 K/UL (ref 4.1–11.1)
WBC URNS QL MICRO: ABNORMAL /HPF (ref 0–4)

## 2025-02-23 PROCEDURE — 6360000004 HC RX CONTRAST MEDICATION: Performed by: STUDENT IN AN ORGANIZED HEALTH CARE EDUCATION/TRAINING PROGRAM

## 2025-02-23 PROCEDURE — 80053 COMPREHEN METABOLIC PANEL: CPT

## 2025-02-23 PROCEDURE — 74177 CT ABD & PELVIS W/CONTRAST: CPT

## 2025-02-23 PROCEDURE — 99285 EMERGENCY DEPT VISIT HI MDM: CPT

## 2025-02-23 PROCEDURE — 93005 ELECTROCARDIOGRAM TRACING: CPT | Performed by: STUDENT IN AN ORGANIZED HEALTH CARE EDUCATION/TRAINING PROGRAM

## 2025-02-23 PROCEDURE — 87186 SC STD MICRODIL/AGAR DIL: CPT

## 2025-02-23 PROCEDURE — 51705 CHANGE OF BLADDER TUBE: CPT

## 2025-02-23 PROCEDURE — 80047 BASIC METABLC PNL IONIZED CA: CPT

## 2025-02-23 PROCEDURE — 85025 COMPLETE CBC W/AUTO DIFF WBC: CPT

## 2025-02-23 PROCEDURE — 96374 THER/PROPH/DIAG INJ IV PUSH: CPT

## 2025-02-23 PROCEDURE — 87086 URINE CULTURE/COLONY COUNT: CPT

## 2025-02-23 PROCEDURE — 87088 URINE BACTERIA CULTURE: CPT

## 2025-02-23 PROCEDURE — 36415 COLL VENOUS BLD VENIPUNCTURE: CPT

## 2025-02-23 PROCEDURE — 81001 URINALYSIS AUTO W/SCOPE: CPT

## 2025-02-23 RX ORDER — IOPAMIDOL 755 MG/ML
100 INJECTION, SOLUTION INTRAVASCULAR
Status: COMPLETED | OUTPATIENT
Start: 2025-02-23 | End: 2025-02-23

## 2025-02-23 RX ORDER — ONDANSETRON 2 MG/ML
4 INJECTION INTRAMUSCULAR; INTRAVENOUS ONCE
Status: DISCONTINUED | OUTPATIENT
Start: 2025-02-23 | End: 2025-02-23

## 2025-02-23 RX ADMIN — IOPAMIDOL 100 ML: 755 INJECTION, SOLUTION INTRAVENOUS at 18:28

## 2025-02-23 ASSESSMENT — PAIN - FUNCTIONAL ASSESSMENT: PAIN_FUNCTIONAL_ASSESSMENT: NONE - DENIES PAIN

## 2025-02-23 NOTE — ED TRIAGE NOTES
PT came in by  with reports of having is catheter clogged today. PT reports nothing is coming out and he cannot flush it.

## 2025-02-24 LAB
EKG DIAGNOSIS: NORMAL
EKG Q-T INTERVAL: 360 MS
EKG QRS DURATION: 70 MS
EKG QTC CALCULATION (BAZETT): 405 MS
EKG R AXIS: 35 DEGREES
EKG T AXIS: 60 DEGREES
EKG VENTRICULAR RATE: 76 BPM

## 2025-02-24 PROCEDURE — 93010 ELECTROCARDIOGRAM REPORT: CPT | Performed by: SPECIALIST

## 2025-02-24 NOTE — ED NOTES
Bedside and Verbal shift change report given to DERREK Langston (oncoming nurse) by DERREK Purdy (offgoing nurse). Report included the following information ED SBAR.

## 2025-02-24 NOTE — ED NOTES
Patient reports symptom improvement or at least no worsening of symptoms since arrival to ED.  Patient denies pain. VSS at time of discharge.  I have reviewed discharge instructions with the patient, who verbalized understanding. Patient stable and discharged from ED via  pt's electric wheelchair   and with SELF.   If applicable, PIV removed yes

## 2025-02-24 NOTE — ED PROVIDER NOTES
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  Today patient noted that his catheter was not draining any fluid, he has a chronic indwelling suprapubic catheter.  He tried flushing at home but had no success and subsequently has had a few episodes of vomiting.  Denies any fevers, does not have sensation in the abdomen and thus denies any abdominal pain.  Coincidently has an appointment with urology tomorrow for routine catheter replacement.    On arrival VS stable.   Physical Exam  General: Alert, no acute distress  HEENT: Normocephalic, atraumatic. EOMI,  moist oral mucosa, no conjunctival injection  Neck: ROM normal, supple  Cardio: Heart regular and regular rhythm, cap refill <2seconds  Lungs: no respiratory distress   Abdomen: abdomen soft, nontender, suprapubic catheter in place with no surrounding skin changes/erythema  MSK: 2+ PT pulses bilaterally, normal ROM quadriplegia baseline  Skin: Warm, dry, no rash  Neuro:  At baseline neuro functional status, ANO x 4    Labs unremarkable, CT showing his suprapubic catheter has migrated up to the ureter.  Suspect vomiting secondary to hydronephrosis seen on CT.    Called and discussed with patient's urologist, they recommend deflating the balloon and removing it from the ureter and

## 2025-07-08 NOTE — PROGRESS NOTES
[facility]   [facility]   Medical Staff Progress Note     Brooks Lazar  MEDICAL RECORD NUMBER:  [id number]  AGE: 43 y.o.   GENDER: male  : 1980  EPISODE DATE:  2024    Chief complaint and reason for visit:     Chief Complaint   Patient presents with    Wound Check     Right heel and left buttock wound       42 yo M with history of cervical injury with quadriplegia with C6-7 injury, T1DM, HTN presents for follow up of chronic right heel and left buttock wounds.  Since last visit [facility] and continues on Levaquin. Reports receiving wound care and debridement while being in the hospital. Continues to shift positions and offload as much as possible. Home health was set up while patient was in the hospital.     Has gel cushion from his chair. Works from home.  Last A1c 11.3 on 24    Subjective and ROS: Symptoms, wound related issues, or other pertinent wound history since last visit: no new wound care issues. Tolerating current treatment. No systemic complaints above baseline.    History of Wound Context: Per original history and physical on this patient. Changes in history since last evaluation: none      Medical Decision Making:     Problem List Items Addressed This Visit          Endocrine    Type 1 diabetes mellitus with hyperglycemia (HCC) - Primary       Other    Pressure injury of right heel, stage 3 (HCC)    Pressure injury of sacral region, unstageable (HCC)     Wounds and Treatment Plan:  Right heel wound: Pressure injury. Stage 3. Measuring smaller. Slough.  Left buttock wound: Pressure injury. Now stage 4. Measuring deeper with exposed muscle. Eschar. Slough.    Debride both wounds today  Ordering Negative pressure therapy for left buttock wound  Dressing: Hydrofera blue ready. Guaze. Foam. Change every other day.   Off load pressure. Counseling techniques and cushioning  Increase protein in  Leda Warren is a 77 year old male here for  Chief Complaint   Patient presents with    Office Visit     Follow up, MM     Denies latex allergy or sensitivity.    Medication verified, no changes.  PCP and Pharmacy verified.    Social History     Tobacco Use   Smoking Status Never   Smokeless Tobacco Never   Tobacco Comments    2nd hand exposure     Advance Directives Filed: No    ECOG:   ECOG   ECOG Performance Status        Vitals:    There were no vitals taken for this visit.    These vital signs are:  Within defined parameters (Per Reference \"Defined Limits Hospital Outpatient Department (HOD)\")    Height: Yes, shoes on.  Ht Readings from Last 1 Encounters:   05/13/25 5' 8\" (1.727 m)     Weight:Yes, shoes on.  Wt Readings from Last 3 Encounters:   05/13/25 94.6 kg (208 lb 8.9 oz)   04/08/25 94.2 kg (207 lb 10.8 oz)   03/11/25 93.3 kg (205 lb 11 oz)       BMI: There is no height or weight on file to calculate BMI.    REVIEW OF SYSTEMS  GENERAL:  Patient denies headache, fevers, chills, night sweats, excessive fatigue, change in appetite, weight loss, dizziness  ALLERGIC/IMMUNOLOGIC: Verified allergies: Yes  EYES:  Patient denies significant visual difficulties, double vision, blurred vision  ENT/MOUTH: Patient denies problems with hearing, sore throat, sinus drainage, mouth sores  ENDOCRINE:  Patient denies diabetes, thyroid disease, hormone replacement, hot flashes  HEMATOLOGIC/LYMPHATIC: Patient denies easy bruising, bleeding, tender lymph nodes, swollen lymph nodes  BREASTS: Patient denies abnormal masses of breast, nipple discharge, pain  RESPIRATORY:  Patient denies lung pain with breathing, cough, coughing up blood, shortness of breath  CARDIOVASCULAR:  Patient denies anginal chest pain, palpitations, shortness of breath when lying flat, peripheral edema  GASTROINTESTINAL: Patient denies abdominal pain , nausea, vomiting, diarrhea, GI bleeding, constipation, change in bowel habits, heartburn, sensation of  feeling full, difficulty swallowing  : Patient denies blood in the urine, burning with urination, frequency, urgency, hesitancy, incontinence  MUSCULOSKELETAL:  Patient denies joint pain, bone pain, joint swelling, redness, decreased range of motion  SKIN:  Patient denies chronic rashes, inflammation, ulcerations, skin changes, itching  NEUROLOGIC:  Patient denies loss of balance, areas of focal weakness, abnormal gait, sensory problems, but complains of: numbness feet and tingling feet  PSYCHIATRIC: Patient denies insomnia, depression, anxiety    This patient reported abnormal symptoms that needed immediate verbal communication: No

## (undated) DEVICE — DRESSING PETRO W3XL8IN N ADH OIL EMUL GZ CURAD

## (undated) DEVICE — GOWN,SIRUS,NONRNF,SETINSLV,XL,20/CS: Brand: MEDLINE

## (undated) DEVICE — INTENDED FOR TISSUE SEPARATION, AND OTHER PROCEDURES THAT REQUIRE A SHARP SURGICAL BLADE TO PUNCTURE OR CUT.: Brand: BARD-PARKER ® CARBON RIB-BACK BLADES

## (undated) DEVICE — TRAY PREP DRY W/ PREM GLV 2 APPL 6 SPNG 2 UNDPD 1 OVERWRAP

## (undated) DEVICE — GAUZE,SPONGE,FLUFF,6"X6.75",STRL,5/TRAY: Brand: MEDLINE

## (undated) DEVICE — PADDING CST 4INX4YD --

## (undated) DEVICE — CUSTOM CAST PD STR

## (undated) DEVICE — BANDAGE,GAUZE,CONFORMING,2"X75",STRL,LF: Brand: MEDLINE INDUSTRIES, INC.

## (undated) DEVICE — ROCKER SWITCH PENCIL BLADE ELECTRODE, HOLSTER: Brand: EDGE

## (undated) DEVICE — ZIMMER® STERILE DISPOSABLE TOURNIQUET CUFF WITH PROTECTIVE SLEEVE AND PLC, DUAL PORT, SINGLE BLADDER, 34 IN. (86 CM)

## (undated) DEVICE — BANDAGE COBAN 4 IN COMPR W4INXL5YD FOAM COHESIVE QUIK STK SELF ADH SFT

## (undated) DEVICE — STRAP,POSITIONING,KNEE/BODY,FOAM,4X60": Brand: MEDLINE

## (undated) DEVICE — INFECTION CONTROL KIT SYS

## (undated) DEVICE — COVER LT HNDL PLAS RIG 1 PER PK

## (undated) DEVICE — SURGICAL PROCEDURE PACK BASIN MAJ SET CUST NO CAUT

## (undated) DEVICE — SUTURE ETHLN SZ 4-0 L18IN NONABSORBABLE BLK L19MM PS-2 3/8 1667H

## (undated) DEVICE — DRAPE,U/ SHT,SPLIT,PLAS,STERIL: Brand: MEDLINE

## (undated) DEVICE — BANDAGE,GAUZE,CONFORMING,3"X75",STRL,LF: Brand: MEDLINE

## (undated) DEVICE — BANDAGE COMPR 9 FTX4 IN SMOOTH COMFORTABLE SYNTH ESMRK LF

## (undated) DEVICE — PACK,BASIC,SIRUS,V: Brand: MEDLINE

## (undated) DEVICE — DRAPE,REIN 53X77,STERILE: Brand: MEDLINE

## (undated) DEVICE — NEEDLE HYPO 25GA L1.5IN BVL ORIENTED ECLIPSE

## (undated) DEVICE — 3M™ STERI-DRAPE™ U-DRAPE 1015: Brand: STERI-DRAPE™

## (undated) DEVICE — PREP KIT PEEL PTCH POVIDONE IOD

## (undated) DEVICE — REM POLYHESIVE ADULT PATIENT RETURN ELECTRODE: Brand: VALLEYLAB

## (undated) DEVICE — BANDAGE COMPR W6INXL10YD ST M E WHITE/BEIGE

## (undated) DEVICE — SOL IRRIGATION INJ NACL 0.9% 500ML BTL

## (undated) DEVICE — STERILE POLYISOPRENE POWDER-FREE SURGICAL GLOVES: Brand: PROTEXIS

## (undated) DEVICE — SPONGE GZ W4XL4IN COT 12 PLY TYP VII WVN C FLD DSGN

## (undated) DEVICE — TUBING SUCT 10FR MAL ALUM SHFT FN CAP VENT UNIV CONN W/ OBT

## (undated) DEVICE — PADDING CAST 4 INX5 YD STRL

## (undated) DEVICE — PAD,ABDOMINAL,5"X9",ST,LF,25/BX: Brand: MEDLINE INDUSTRIES, INC.

## (undated) DEVICE — BNDG ELAS HK LOOP 3X5YD NS -- MATRIX

## (undated) DEVICE — SWAB CULT DBL W/O CHAR RAYON TIP AMIES GEL CLMN FOR COLL

## (undated) DEVICE — DRAPE,EXTREMITY,89X128,STERILE: Brand: MEDLINE

## (undated) DEVICE — BLADE SAW OSC COARSE 25X9MM --

## (undated) DEVICE — SUTURE VCRL SZ 4-0 L27IN ABSRB UD L26MM SH 1/2 CIR J415H